# Patient Record
Sex: FEMALE | Race: WHITE | NOT HISPANIC OR LATINO | Employment: OTHER | URBAN - METROPOLITAN AREA
[De-identification: names, ages, dates, MRNs, and addresses within clinical notes are randomized per-mention and may not be internally consistent; named-entity substitution may affect disease eponyms.]

---

## 2023-04-13 ENCOUNTER — OFFICE VISIT (OUTPATIENT)
Dept: PRIMARY CARE | Facility: CLINIC | Age: 63
End: 2023-04-13
Payer: COMMERCIAL

## 2023-04-13 VITALS
OXYGEN SATURATION: 98 % | HEART RATE: 90 BPM | HEIGHT: 62 IN | TEMPERATURE: 97.7 F | BODY MASS INDEX: 29.08 KG/M2 | SYSTOLIC BLOOD PRESSURE: 122 MMHG | WEIGHT: 158 LBS | DIASTOLIC BLOOD PRESSURE: 76 MMHG | RESPIRATION RATE: 18 BRPM

## 2023-04-13 DIAGNOSIS — T84.629A: ICD-10-CM

## 2023-04-13 DIAGNOSIS — N18.4 STAGE 4 CHRONIC KIDNEY DISEASE (MULTI): Primary | ICD-10-CM

## 2023-04-13 DIAGNOSIS — E78.5 HYPERLIPIDEMIA, UNSPECIFIED HYPERLIPIDEMIA TYPE: ICD-10-CM

## 2023-04-13 DIAGNOSIS — M35.3 PMR (POLYMYALGIA RHEUMATICA) (MULTI): ICD-10-CM

## 2023-04-13 DIAGNOSIS — I50.20 HFREF (HEART FAILURE WITH REDUCED EJECTION FRACTION) (MULTI): ICD-10-CM

## 2023-04-13 PROBLEM — I10 BENIGN ESSENTIAL HYPERTENSION: Status: ACTIVE | Noted: 2019-03-18

## 2023-04-13 PROBLEM — S82.63XA CLOSED FRACTURE OF LATERAL MALLEOLUS: Status: ACTIVE | Noted: 2022-02-14

## 2023-04-13 PROBLEM — M31.6 GCA (GIANT CELL ARTERITIS) (MULTI): Status: ACTIVE | Noted: 2019-03-18

## 2023-04-13 PROBLEM — I51.81 TAKOTSUBO CARDIOMYOPATHY: Status: ACTIVE | Noted: 2021-03-17

## 2023-04-13 PROBLEM — E78.2 MIXED HYPERLIPIDEMIA: Chronic | Status: ACTIVE | Noted: 2022-07-24

## 2023-04-13 PROBLEM — I50.32 CHRONIC DIASTOLIC CHF (CONGESTIVE HEART FAILURE) (MULTI): Status: ACTIVE | Noted: 2022-08-16

## 2023-04-13 PROBLEM — Q60.2 ABSENT KIDNEY, CONGENITAL: Status: ACTIVE | Noted: 2019-03-18

## 2023-04-13 PROBLEM — S82.53XA CLOSED FRACTURE OF MEDIAL MALLEOLUS: Status: ACTIVE | Noted: 2022-02-14

## 2023-04-13 PROCEDURE — 1036F TOBACCO NON-USER: CPT | Performed by: FAMILY MEDICINE

## 2023-04-13 PROCEDURE — 3078F DIAST BP <80 MM HG: CPT | Performed by: FAMILY MEDICINE

## 2023-04-13 PROCEDURE — 99214 OFFICE O/P EST MOD 30 MIN: CPT | Performed by: FAMILY MEDICINE

## 2023-04-13 PROCEDURE — 3074F SYST BP LT 130 MM HG: CPT | Performed by: FAMILY MEDICINE

## 2023-04-13 RX ORDER — PREDNISONE 5 MG/1
TABLET ORAL
COMMUNITY
End: 2023-04-13

## 2023-04-13 RX ORDER — NIFEDIPINE 90 MG/1
1 TABLET, EXTENDED RELEASE ORAL 2 TIMES DAILY
COMMUNITY
Start: 2023-02-13 | End: 2023-04-19 | Stop reason: SDUPTHER

## 2023-04-13 RX ORDER — NIFEDIPINE 90 MG/1
1 TABLET, FILM COATED, EXTENDED RELEASE ORAL 2 TIMES DAILY
COMMUNITY
Start: 2023-03-18 | End: 2023-04-13

## 2023-04-13 RX ORDER — SODIUM POLYSTYRENE SULFONATE 4.1 MEQ/G
POWDER, FOR SUSPENSION ORAL; RECTAL
COMMUNITY
Start: 2022-10-06 | End: 2023-04-13

## 2023-04-13 RX ORDER — CIPROFLOXACIN 500 MG/1
TABLET ORAL
COMMUNITY
Start: 2022-07-26 | End: 2023-04-13

## 2023-04-13 RX ORDER — ACETAMINOPHEN 500 MG
1 TABLET ORAL DAILY
Status: ON HOLD | COMMUNITY
Start: 2018-03-05 | End: 2023-10-28 | Stop reason: ENTERED-IN-ERROR

## 2023-04-13 RX ORDER — LEFLUNOMIDE 20 MG/1
20 TABLET ORAL DAILY
Status: ON HOLD | COMMUNITY
End: 2023-10-28 | Stop reason: ALTCHOICE

## 2023-04-13 RX ORDER — OXYCODONE HYDROCHLORIDE 5 MG/1
1 TABLET ORAL EVERY 6 HOURS
COMMUNITY
Start: 2022-11-09 | End: 2023-04-13

## 2023-04-13 RX ORDER — LOSARTAN POTASSIUM AND HYDROCHLOROTHIAZIDE 12.5; 5 MG/1; MG/1
1 TABLET ORAL DAILY
COMMUNITY
Start: 2022-06-29 | End: 2023-04-13

## 2023-04-13 RX ORDER — PREDNISONE 1 MG/1
1 TABLET ORAL DAILY
Qty: 30 TABLET | Refills: 3 | Status: SHIPPED | COMMUNITY
Start: 2023-04-13 | End: 2023-06-08

## 2023-04-13 RX ORDER — LOSARTAN POTASSIUM 50 MG/1
1 TABLET ORAL DAILY
COMMUNITY
Start: 2022-08-22 | End: 2023-04-13

## 2023-04-13 RX ORDER — IBANDRONATE SODIUM 150 MG/1
TABLET, FILM COATED ORAL
COMMUNITY
Start: 2018-03-05 | End: 2023-04-13

## 2023-04-13 RX ORDER — PREDNISONE 20 MG/1
26 TABLET ORAL DAILY
Status: ON HOLD | COMMUNITY
End: 2023-10-28 | Stop reason: ENTERED-IN-ERROR

## 2023-04-13 RX ORDER — NIFEDIPINE 60 MG/1
TABLET, FILM COATED, EXTENDED RELEASE ORAL 2 TIMES DAILY
COMMUNITY
End: 2023-04-13

## 2023-04-13 RX ORDER — APIXABAN 5 MG/1
5 TABLET, FILM COATED ORAL 2 TIMES DAILY
COMMUNITY
End: 2023-06-08

## 2023-04-13 RX ORDER — NIFEDIPINE 30 MG/1
1 TABLET, EXTENDED RELEASE ORAL DAILY
COMMUNITY
Start: 2022-10-01 | End: 2023-04-13

## 2023-04-13 RX ORDER — POLYETHYLENE GLYCOL 3350, SODIUM CHLORIDE, SODIUM BICARBONATE, POTASSIUM CHLORIDE 420; 11.2; 5.72; 1.48 G/4L; G/4L; G/4L; G/4L
POWDER, FOR SOLUTION ORAL
COMMUNITY
Start: 2022-11-21 | End: 2023-06-08

## 2023-04-13 RX ORDER — LEVOFLOXACIN 500 MG/1
1 TABLET, FILM COATED ORAL DAILY
COMMUNITY
Start: 2022-04-27 | End: 2023-04-13

## 2023-04-13 RX ORDER — CHOLECALCIFEROL (VITAMIN D3) 50 MCG
1 TABLET ORAL DAILY
COMMUNITY
End: 2023-04-13

## 2023-04-13 RX ORDER — ESOMEPRAZOLE MAGNESIUM 40 MG/1
1 CAPSULE, DELAYED RELEASE ORAL
COMMUNITY
Start: 2018-01-26 | End: 2023-04-13

## 2023-04-13 RX ORDER — HYDROCHLOROTHIAZIDE 25 MG/1
1 TABLET ORAL DAILY
COMMUNITY
Start: 2018-01-16 | End: 2023-04-13

## 2023-04-13 RX ORDER — AMITRIPTYLINE HYDROCHLORIDE 25 MG/1
25 TABLET, FILM COATED ORAL NIGHTLY
COMMUNITY
End: 2023-06-08 | Stop reason: SDUPTHER

## 2023-04-13 RX ORDER — MINERAL OIL
1 ENEMA (ML) RECTAL DAILY
COMMUNITY
Start: 2018-03-05

## 2023-04-13 RX ORDER — OMEPRAZOLE 20 MG/1
1 TABLET, DELAYED RELEASE ORAL DAILY
COMMUNITY
End: 2023-04-13

## 2023-04-13 RX ORDER — EVOLOCUMAB 140 MG/ML
INJECTION, SOLUTION SUBCUTANEOUS
Status: ON HOLD | COMMUNITY
Start: 2023-04-07 | End: 2023-12-25 | Stop reason: ENTERED-IN-ERROR

## 2023-04-13 RX ORDER — METOPROLOL SUCCINATE 50 MG/1
TABLET, EXTENDED RELEASE ORAL
COMMUNITY
Start: 2018-01-16 | End: 2023-04-13

## 2023-04-13 RX ORDER — NIFEDIPINE 60 MG/1
1 TABLET, EXTENDED RELEASE ORAL 2 TIMES DAILY
COMMUNITY
Start: 2022-12-21 | End: 2023-04-13

## 2023-04-13 RX ORDER — ASPIRIN 81 MG/1
TABLET ORAL
Status: ON HOLD | COMMUNITY
Start: 2023-03-06 | End: 2023-10-28 | Stop reason: ENTERED-IN-ERROR

## 2023-04-13 RX ORDER — PREDNISONE 1 MG/1
TABLET ORAL
COMMUNITY
End: 2023-04-13 | Stop reason: DRUGHIGH

## 2023-04-13 RX ORDER — MECLIZINE HYDROCHLORIDE 25 MG/1
TABLET ORAL
COMMUNITY
Start: 2022-07-26 | End: 2023-04-13

## 2023-04-13 RX ORDER — AMOXICILLIN 500 MG
1200 CAPSULE ORAL
Status: ON HOLD | COMMUNITY
End: 2023-10-28 | Stop reason: ALTCHOICE

## 2023-04-13 RX ORDER — OMEPRAZOLE 20 MG/1
1 CAPSULE, DELAYED RELEASE ORAL DAILY
COMMUNITY
Start: 2023-01-30

## 2023-04-13 RX ORDER — METOPROLOL SUCCINATE 100 MG/1
100 TABLET, EXTENDED RELEASE ORAL 2 TIMES DAILY
COMMUNITY
End: 2023-06-08

## 2023-04-13 ASSESSMENT — ENCOUNTER SYMPTOMS
HEADACHES: 0
SHORTNESS OF BREATH: 0

## 2023-04-13 NOTE — PROGRESS NOTES
"Subjective     Estephania Hernandez is a 62 y.o. female who presents for Hospital Follow-up (Pt. In for hospital follow up after leg surgery.).    HPI     Pt is here for hospital follow up from [facility].  She has history of right tibia fracture (Feb. 2022). She was admitted to [facility] on 2/28/23 , discharged 3/6/23.   She had surgery on her right leg by Dr. Fernandez Melissa MD for infection associated with internal fixation device of bone of lower leg.  She was electively admitted on 2/28.  Surgery was performed on 3/1/23.  Her surgery went well, she had post operative xrays in recovery room that showed intact hardware.  While in hospital, nephrology was consulted due to elevated creatinine over baseline.  She has CKD III.  She has history of one kidney (born with one kidney).  She sees ID at [facility].  She will be following up next week with ID.  Pt has PICC line in place and is getting IV antibiotic (Ertapenem).  Pt sees Dr. Melissa routinely for her right leg pain and fx hx.      She has had multiple surgeries on her right lower leg.       She is overall feeling better since starting antibiotics.  She gets HHC and wound care dressing changes at home.  C is also managing her PICC.  She denies fevers, chills.      Pt sees  cardio, Dr. Espitia, for HLD - on repatha.  Statin intolerance    Pt sees rheumatology, Dr. Sanchez , on prednisone, hx of giant cell arteritis and polymyalgia rheumatica.          Review of Systems   Respiratory:  Negative for shortness of breath.    Cardiovascular:  Negative for chest pain.   Neurological:  Negative for headaches.       Objective     Vitals:    04/13/23 1004   BP: 122/76   BP Location: Right arm   Patient Position: Sitting   Pulse: 90   Resp: 18   Temp: 36.5 °C (97.7 °F)   TempSrc: Temporal   SpO2: 98%   Weight: 71.7 kg (158 lb)   Height: 1.575 m (5' 2\")        Current Outpatient Medications   Medication Instructions    amitriptyline (ELAVIL) 25 mg, oral, Nightly    aspirin " 81 mg EC tablet TAKE ONE TABLET BY MOUTH TWO TIMES A DAY. ONCE COMPLETED 1 MONTH OF TWICE DAILY DOSING MAY THEN RESUME YOUR NORMAL ONCE A DAY DOSE OF THIS M    cholecalciferol (Vitamin D-3) 50 mcg (2,000 unit) capsule 1 capsule, oral, Daily    Eliquis 5 mg, oral, 2 times daily    ertapenem sodium (ERTAPENEM IV) 500 mg, intravenous, Daily RT    fexofenadine (Allegra) 180 mg tablet 1 tablet, oral, Daily    leflunomide (ARAVA) 20 mg, oral, Daily    metoprolol succinate XL (TOPROL-XL) 100 mg, oral, 2 times daily    NIFEdipine XL (Procardia XL) 90 mg 24 hr tablet 1 tablet, oral, 2 times daily    omega-3 fatty acids-fish oil 300-1,000 mg capsule 1,200 mg, oral    omeprazole (PriLOSEC) 20 mg DR capsule 1 capsule, oral, Daily    polyethylene glycol-electrolytes 420 gram solution TAKE 4000 ML Other as per split dose [facility] instructions    predniSONE (Deltasone) 20 mg tablet oral    predniSONE (DELTASONE) 1 mg, oral, Daily    Repatha SureClick 140 mg/mL injection INJECT 140MG SUBCUTEANOUSLY EVERY 2 WEEKS        Past Surgical History:   Procedure Laterality Date    OTHER SURGICAL HISTORY  10/21/2022    Mastectomy    OTHER SURGICAL HISTORY  10/21/2022    Lower leg fracture repair    OTHER SURGICAL HISTORY  10/21/2022    Ankle fracture repair    OTHER SURGICAL HISTORY  10/21/2022    Lower leg fracture repair    OTHER SURGICAL HISTORY  10/21/2022    Colonoscopy    OTHER SURGICAL HISTORY  10/21/2022    Temporal artery biopsy    OTHER SURGICAL HISTORY  10/21/2022    Mastectomy bilateral    OTHER SURGICAL HISTORY  10/21/2022    Appendectomy        Social History     Tobacco Use    Smoking status: Never    Smokeless tobacco: Never        No family history on file.     Immunization History   Administered Date(s) Administered    Pfizer Purple Cap SARS-CoV-2 04/05/2021, 04/26/2021, 12/28/2021    Pfizer Sars-cov-2 Bivalent 30 mcg/0.3 mL 10/28/2022        Physical Exam  Vitals reviewed.   Constitutional:       General: She is not in  acute distress.     Appearance: Normal appearance. She is well-developed. She is obese.   HENT:      Head: Normocephalic and atraumatic.      Nose: Nose normal.   Eyes:      General: Lids are normal.      Extraocular Movements: Extraocular movements intact.      Conjunctiva/sclera: Conjunctivae normal.      Right eye: Right conjunctiva is not injected.      Left eye: Left conjunctiva is not injected.   Cardiovascular:      Rate and Rhythm: Normal rate and regular rhythm.      Heart sounds: No murmur heard.  Pulmonary:      Effort: Pulmonary effort is normal. No respiratory distress.      Breath sounds: Normal breath sounds. No wheezing, rhonchi or rales.   Musculoskeletal:      Comments: Right lower leg is bandaged.  She does have moderate nonpitting edema on dorsal aspect of right foot which is chronic since her recent surgery on her right leg.    Lymphadenopathy:      Cervical: No cervical adenopathy.   Skin:     General: Skin is warm and dry.      Findings: No rash.   Neurological:      Mental Status: She is alert and oriented to person, place, and time. Mental status is at baseline.      Gait: Gait normal.   Psychiatric:         Mood and Affect: Mood normal.         Behavior: Behavior normal.         Problem List Items Addressed This Visit       HFrEF (heart failure with reduced ejection fraction) (CMS/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      HTN - controlled    CKD - sees nephro    Former nicotine  use    Leukocytosis - sees ID, pt is on chronic prednisone    Temporal arteritis, PMR - sees Rheum. On prednisone    Hx of HLD - statin intolerant - on repatha through  cardio.      Follow up in 3 months or sooner if needed     Go the the nearest Emergency Department if your condition worsens significantly or becomes life-threatening.

## 2023-04-14 LAB
CHOLESTEROL (MG/DL) IN SER/PLAS: 286 MG/DL (ref 0–199)
CHOLESTEROL IN HDL (MG/DL) IN SER/PLAS: 98.5 MG/DL
CHOLESTEROL/HDL RATIO: 2.9
LDL: 132 MG/DL (ref 0–99)
NON HDL CHOLESTEROL: 188 MG/DL
TRIGLYCERIDE (MG/DL) IN SER/PLAS: 278 MG/DL (ref 0–149)
VLDL: 56 MG/DL (ref 0–40)

## 2023-04-17 LAB
CHOLESTEROL, TOTAL(NMR): 314 MG/DL (ref 100–199)
HDL-C(NMR): 97 MG/DL
HDL-P (TOTAL)(NMR): 55.6 UMOL/L
LDL AND HDL PARTICLES -DATA CONVERSION: ABNORMAL
LDL SIZE(NMR): 21.7 NM
LDL-C (NIH CALC)(NMR): 167 MG/DL (ref 0–99)
LDL-P(NMR): 1482 NMOL/L
LP-IR SCORE(NMR): 41
SMALL LDL-P(NMR): 345 NMOL/L
TRIGLYCERIDES(NMR): 274 MG/DL (ref 0–149)

## 2023-04-19 DIAGNOSIS — I10 HYPERTENSION, ESSENTIAL, BENIGN: Primary | ICD-10-CM

## 2023-04-19 RX ORDER — NIFEDIPINE 90 MG/1
90 TABLET, EXTENDED RELEASE ORAL 2 TIMES DAILY
Qty: 180 TABLET | Refills: 3 | Status: SHIPPED | OUTPATIENT
Start: 2023-04-19 | End: 2024-05-02 | Stop reason: SDUPTHER

## 2023-05-31 ENCOUNTER — PATIENT OUTREACH (OUTPATIENT)
Dept: CARE COORDINATION | Facility: CLINIC | Age: 63
End: 2023-05-31

## 2023-05-31 RX ORDER — METOPROLOL TARTRATE 50 MG/1
1 TABLET ORAL 2 TIMES DAILY
COMMUNITY

## 2023-05-31 RX ORDER — LISINOPRIL 5 MG/1
5 TABLET ORAL DAILY
Status: ON HOLD | COMMUNITY
End: 2023-10-28 | Stop reason: ALTCHOICE

## 2023-05-31 NOTE — PROGRESS NOTES
Discharge Facility: [facility]  Discharge Diagnosis: Non-healing wound of lower extremity, Cardiopulmonary arrest, Altered mental status, Acute respiratory failure, Sepsis, SUKHDEEP  Admission Date: 5/18/2023  Discharge Date: 5/30/2023    PCP Appointment Date: Pt requesting virtual follow up appt. Messsage sent to office clerical to attempt to reach out and schedule.     Specialist Appointment Date:   Infectious disease, hematologist/oncologist, orthopedic surgeon, cardiology. Appts not scheduled at this time. Pt encouraged to schedule.     Hospital Encounter and Summary: Linked   See discharge assessment below for further details    Engagement  Call Start Time: 0910 (new prescriptions reviewed) (5/31/2023  9:10 AM)    Medications  Medications reviewed with patient/caregiver?: Yes (new prescriptions reviewed) (5/31/2023  9:10 AM)  Is the patient having any side effects they believe may be caused by any medication additions or changes?: No (5/31/2023  9:10 AM)  Does the patient have all medications ordered at discharge?: Yes (5/31/2023  9:10 AM)  Care Management Interventions: No intervention needed (5/31/2023  9:10 AM)  Is the patient taking all medications as directed (includes completed medication regime)?: Yes (5/31/2023  9:10 AM)  Care Management Interventions: Provided patient education (5/31/2023  9:10 AM)    Appointments  Does the patient have a primary care provider?: Yes (5/31/2023  9:10 AM)  Care Management Interventions: Advised patient to make appointment (5/31/2023  9:10 AM)  Care Management Interventions: Advised to schedule with specialist (5/31/2023  9:10 AM)    Self Management  What is the home health agency?: [facility] (5/31/2023  9:10 AM)  Has home health visited the patient within 72 hours of discharge?: Call prior to 72 hours (Per patient C to come today) (5/31/2023  9:10 AM)  What Durable Medical Equipment (DME) was ordered?: Pt states wound vac was delivered. (5/31/2023  9:10 AM)  Has all Durable  Medical Equipment (DME) been delivered?: Yes (5/31/2023  9:10 AM)    Patient Teaching  Does the patient have access to their discharge instructions?: Yes (5/31/2023  9:10 AM)  Care Management Interventions: Reviewed instructions with patient (5/31/2023  9:10 AM)  What is the patient's perception of their health status since discharge?: Improving (5/31/2023  9:10 AM)  Is the patient/caregiver able to teach back the hierarchy of who to call/visit for symptoms/problems? PCP, Specialist, Home Health nurse, Urgent Care, ED, 911: Yes (5/31/2023  9:10 AM)    Wrap Up  Wrap Up Additional Comments: Pt reports confusion over 3 weeks. Pt states she is having a hard time getting around at this time. Pt was discharged with rizzo and IV antiobiotics. (5/31/2023  9:10 AM)  Call End Time: 0940 (5/31/2023  9:10 AM)

## 2023-06-01 NOTE — PROGRESS NOTES
Per Dr. Valles's office, he had her stop her Eliquis back in December, but it remained on her med list. She does not need to be on the Eliquis and does not need to follow up with him at this time. -NA

## 2023-06-05 LAB
ALANINE AMINOTRANSFERASE (SGPT) (U/L) IN SER/PLAS: 24 U/L (ref 7–45)
ALBUMIN (G/DL) IN SER/PLAS: 4 G/DL (ref 3.4–5)
ALKALINE PHOSPHATASE (U/L) IN SER/PLAS: 151 U/L (ref 33–136)
ANION GAP IN SER/PLAS: 17 MMOL/L (ref 10–20)
ASPARTATE AMINOTRANSFERASE (SGOT) (U/L) IN SER/PLAS: 23 U/L (ref 9–39)
BILIRUBIN DIRECT (MG/DL) IN SER/PLAS: 0.1 MG/DL (ref 0–0.3)
BILIRUBIN TOTAL (MG/DL) IN SER/PLAS: 0.4 MG/DL (ref 0–1.2)
C REACTIVE PROTEIN (MG/L) IN SER/PLAS: 0.58 MG/DL
CALCIUM (MG/DL) IN SER/PLAS: 9.3 MG/DL (ref 8.6–10.3)
CARBON DIOXIDE, TOTAL (MMOL/L) IN SER/PLAS: 22 MMOL/L (ref 21–32)
CHLORIDE (MMOL/L) IN SER/PLAS: 107 MMOL/L (ref 98–107)
CREATINE KINASE (U/L) IN SER/PLAS: 23 U/L (ref 0–215)
CREATININE (MG/DL) IN SER/PLAS: 1.87 MG/DL (ref 0.5–1.05)
ERYTHROCYTE DISTRIBUTION WIDTH (RATIO) BY AUTOMATED COUNT: 18.9 % (ref 11.5–14.5)
ERYTHROCYTE MEAN CORPUSCULAR HEMOGLOBIN CONCENTRATION (G/DL) BY AUTOMATED: 29.9 G/DL (ref 32–36)
ERYTHROCYTE MEAN CORPUSCULAR VOLUME (FL) BY AUTOMATED COUNT: 101 FL (ref 80–100)
ERYTHROCYTES (10*6/UL) IN BLOOD BY AUTOMATED COUNT: 3.36 X10E12/L (ref 4–5.2)
GFR FEMALE: 30 ML/MIN/1.73M2
GLUCOSE (MG/DL) IN SER/PLAS: 125 MG/DL (ref 74–99)
HEMATOCRIT (%) IN BLOOD BY AUTOMATED COUNT: 34.1 % (ref 36–46)
HEMOGLOBIN (G/DL) IN BLOOD: 10.2 G/DL (ref 12–16)
LEUKOCYTES (10*3/UL) IN BLOOD BY AUTOMATED COUNT: 13.7 X10E9/L (ref 4.4–11.3)
PLATELETS (10*3/UL) IN BLOOD AUTOMATED COUNT: 476 X10E9/L (ref 150–450)
POTASSIUM (MMOL/L) IN SER/PLAS: 4.8 MMOL/L (ref 3.5–5.3)
PROTEIN TOTAL: 6.3 G/DL (ref 6.4–8.2)
SODIUM (MMOL/L) IN SER/PLAS: 141 MMOL/L (ref 136–145)
UREA NITROGEN (MG/DL) IN SER/PLAS: 23 MG/DL (ref 6–23)

## 2023-06-08 ENCOUNTER — OFFICE VISIT (OUTPATIENT)
Dept: PRIMARY CARE | Facility: CLINIC | Age: 63
End: 2023-06-08
Payer: COMMERCIAL

## 2023-06-08 VITALS
DIASTOLIC BLOOD PRESSURE: 86 MMHG | BODY MASS INDEX: 25.97 KG/M2 | TEMPERATURE: 97.7 F | SYSTOLIC BLOOD PRESSURE: 124 MMHG | RESPIRATION RATE: 18 BRPM | HEART RATE: 89 BPM | OXYGEN SATURATION: 95 % | WEIGHT: 142 LBS

## 2023-06-08 DIAGNOSIS — A41.9 SEPSIS, DUE TO UNSPECIFIED ORGANISM, UNSPECIFIED WHETHER ACUTE ORGAN DYSFUNCTION PRESENT (MULTI): ICD-10-CM

## 2023-06-08 DIAGNOSIS — I50.20 HFREF (HEART FAILURE WITH REDUCED EJECTION FRACTION) (MULTI): ICD-10-CM

## 2023-06-08 DIAGNOSIS — I10 HYPERTENSION, ESSENTIAL, BENIGN: Primary | ICD-10-CM

## 2023-06-08 DIAGNOSIS — E78.5 HYPERLIPIDEMIA, UNSPECIFIED HYPERLIPIDEMIA TYPE: ICD-10-CM

## 2023-06-08 DIAGNOSIS — N18.4 STAGE 4 CHRONIC KIDNEY DISEASE (MULTI): ICD-10-CM

## 2023-06-08 DIAGNOSIS — T84.629A: ICD-10-CM

## 2023-06-08 DIAGNOSIS — S81.801S NON-HEALING WOUND OF LOWER EXTREMITY, RIGHT, SEQUELA: ICD-10-CM

## 2023-06-08 PROBLEM — I46.9 CARDIOPULMONARY ARREST (MULTI): Status: ACTIVE | Noted: 2023-06-08

## 2023-06-08 PROBLEM — G47.00 INSOMNIA: Status: ACTIVE | Noted: 2023-06-08

## 2023-06-08 PROBLEM — K21.9 GERD (GASTROESOPHAGEAL REFLUX DISEASE): Status: ACTIVE | Noted: 2023-06-08

## 2023-06-08 PROBLEM — Z86.711 HISTORY OF PULMONARY EMBOLISM: Status: ACTIVE | Noted: 2023-06-08

## 2023-06-08 PROBLEM — S81.809A NON-HEALING WOUND OF LOWER EXTREMITY: Status: ACTIVE | Noted: 2023-06-08

## 2023-06-08 PROBLEM — E55.9 VITAMIN D DEFICIENCY: Status: ACTIVE | Noted: 2023-06-08

## 2023-06-08 PROCEDURE — 1036F TOBACCO NON-USER: CPT | Performed by: FAMILY MEDICINE

## 2023-06-08 PROCEDURE — 99214 OFFICE O/P EST MOD 30 MIN: CPT | Performed by: FAMILY MEDICINE

## 2023-06-08 PROCEDURE — 3079F DIAST BP 80-89 MM HG: CPT | Performed by: FAMILY MEDICINE

## 2023-06-08 PROCEDURE — 3074F SYST BP LT 130 MM HG: CPT | Performed by: FAMILY MEDICINE

## 2023-06-08 RX ORDER — AMITRIPTYLINE HYDROCHLORIDE 25 MG/1
25 TABLET, FILM COATED ORAL NIGHTLY
Qty: 90 TABLET | Refills: 3 | COMMUNITY
Start: 2023-06-08 | End: 2023-08-29 | Stop reason: SDUPTHER

## 2023-06-08 ASSESSMENT — ENCOUNTER SYMPTOMS
SHORTNESS OF BREATH: 0
HEADACHES: 0

## 2023-06-08 NOTE — PROGRESS NOTES
Subjective     Estephania Hernandez is a 62 y.o. female who presents for Hospital Follow-up.    HPI     Pt is following up from hospital altered mental status , lethargy, cardiac arrest, bradycardia, sepsis secondary to right lower leg wound.  Pt suffered a cardiac arrest while in ER at [facility] -resuscitated - intubated, admitted to ICU, treated with IV antbiotics, antivirals, had lumbar puncture, EEG.  Found to have SUKHDEEP, hx of solitary kidney, went on CRRT while in ICU.  Pt had nephrology, neurology, cardiology, orthopedist and ID consultations.  PICC line in place, pt is on two IV antibiotics (daptomycin and ceftazidime)  and sees wound care, ID, ortho,  and home health care.  Her neice helps with bandage changes of her right lower extremity wound.      She sees rheum for temporal arteritis - on prednisone.      She had recent cardio visit and will be getting stress test next month.     She takes amitriptyline 25 mg half tab at bedtime however her sleep has been more disrupted over the last few weeks due to increase in her prednisone.  She is requesting increase of her amitriptyline.      Overall she has been feeling better since starting the IV antibiotics for her right lower leg wound, hx of tib/fib fx.  She is closely followed by  wound care and ID.  She gets weekly labs.      Review of Systems   Respiratory:  Negative for shortness of breath.    Cardiovascular:  Negative for chest pain.   Neurological:  Negative for headaches.       Objective     Vitals:    06/08/23 1344   BP: 124/86   BP Location: Left arm   Patient Position: Sitting   Pulse: 89   Resp: 18   Temp: 36.5 °C (97.7 °F)   TempSrc: Temporal   SpO2: 95%   Weight: 64.4 kg (142 lb)        Current Outpatient Medications   Medication Instructions    amitriptyline (ELAVIL) 25 mg, oral, Nightly    aspirin 81 mg EC tablet TAKE ONE TABLET BY MOUTH TWO TIMES A DAY. ONCE COMPLETED 1 MONTH OF TWICE DAILY DOSING MAY THEN RESUME YOUR NORMAL ONCE A DAY DOSE OF THIS M     cholecalciferol (Vitamin D-3) 50 mcg (2,000 unit) capsule 1 capsule, oral, Daily    fexofenadine (Allegra) 180 mg tablet 1 tablet, oral, Daily    leflunomide (ARAVA) 20 mg, oral, Daily    lisinopril 5 mg, oral, Daily    metoprolol tartrate (LOPRESSOR) 50 mg, oral, 2 times daily    NIFEdipine XL (PROCARDIA XL) 90 mg, oral, 2 times daily    omega-3 fatty acids-fish oil 300-1,000 mg capsule 1,200 mg, oral    omeprazole (PriLOSEC) 20 mg DR capsule 1 capsule, oral, Daily    predniSONE (DELTASONE) 40 mg, oral, Daily    Repatha SureClick 140 mg/mL injection INJECT 140MG SUBCUTEANOUSLY EVERY 2 WEEKS        Past Surgical History:   Procedure Laterality Date    OTHER SURGICAL HISTORY  10/21/2022    Mastectomy    OTHER SURGICAL HISTORY  10/21/2022    Lower leg fracture repair    OTHER SURGICAL HISTORY  10/21/2022    Ankle fracture repair    OTHER SURGICAL HISTORY  10/21/2022    Lower leg fracture repair    OTHER SURGICAL HISTORY  10/21/2022    Colonoscopy    OTHER SURGICAL HISTORY  10/21/2022    Temporal artery biopsy    OTHER SURGICAL HISTORY  10/21/2022    Mastectomy bilateral    OTHER SURGICAL HISTORY  10/21/2022    Appendectomy        Social History     Tobacco Use    Smoking status: Never    Smokeless tobacco: Never        No family history on file.     Immunization History   Administered Date(s) Administered    Pfizer Purple Cap SARS-CoV-2 04/05/2021, 04/26/2021, 12/28/2021    Pfizer Sars-cov-2 Bivalent 30 mcg/0.3 mL 10/28/2022        Physical Exam  Vitals reviewed.   Constitutional:       General: She is not in acute distress.     Appearance: Normal appearance. She is well-developed.   HENT:      Head: Normocephalic and atraumatic.      Nose: Nose normal.   Eyes:      General: Lids are normal.      Extraocular Movements: Extraocular movements intact.      Conjunctiva/sclera: Conjunctivae normal.      Right eye: Right conjunctiva is not injected.      Left eye: Left conjunctiva is not injected.   Cardiovascular:       Rate and Rhythm: Normal rate and regular rhythm.      Heart sounds: No murmur heard.  Pulmonary:      Effort: Pulmonary effort is normal. No respiratory distress.      Breath sounds: Normal breath sounds. No wheezing, rhonchi or rales.   Lymphadenopathy:      Cervical: No cervical adenopathy.   Skin:     General: Skin is warm and dry.      Findings: No rash.      Comments: Right lower leg bandaged    Neurological:      Mental Status: She is alert and oriented to person, place, and time. Mental status is at baseline.   Psychiatric:         Mood and Affect: Mood normal.         Behavior: Behavior normal.         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

## 2023-06-12 LAB
ALANINE AMINOTRANSFERASE (SGPT) (U/L) IN SER/PLAS: 21 U/L (ref 7–45)
ALBUMIN (G/DL) IN SER/PLAS: 3.8 G/DL (ref 3.4–5)
ALKALINE PHOSPHATASE (U/L) IN SER/PLAS: 110 U/L (ref 33–136)
ASPARTATE AMINOTRANSFERASE (SGOT) (U/L) IN SER/PLAS: 17 U/L (ref 9–39)
BILIRUBIN DIRECT (MG/DL) IN SER/PLAS: 0 MG/DL (ref 0–0.3)
BILIRUBIN TOTAL (MG/DL) IN SER/PLAS: 0.3 MG/DL (ref 0–1.2)
C REACTIVE PROTEIN (MG/L) IN SER/PLAS: 0.93 MG/DL
CREATINE KINASE (U/L) IN SER/PLAS: 22 U/L (ref 0–215)
ERYTHROCYTE DISTRIBUTION WIDTH (RATIO) BY AUTOMATED COUNT: 18.3 % (ref 11.5–14.5)
ERYTHROCYTE MEAN CORPUSCULAR HEMOGLOBIN CONCENTRATION (G/DL) BY AUTOMATED: 29.9 G/DL (ref 32–36)
ERYTHROCYTE MEAN CORPUSCULAR VOLUME (FL) BY AUTOMATED COUNT: 101 FL (ref 80–100)
ERYTHROCYTES (10*6/UL) IN BLOOD BY AUTOMATED COUNT: 3.34 X10E12/L (ref 4–5.2)
HEMATOCRIT (%) IN BLOOD BY AUTOMATED COUNT: 33.8 % (ref 36–46)
HEMOGLOBIN (G/DL) IN BLOOD: 10.1 G/DL (ref 12–16)
LEUKOCYTES (10*3/UL) IN BLOOD BY AUTOMATED COUNT: 12.9 X10E9/L (ref 4.4–11.3)
NRBC (PER 100 WBCS) BY AUTOMATED COUNT: 0 /100 WBC (ref 0–0)
PLATELETS (10*3/UL) IN BLOOD AUTOMATED COUNT: 306 X10E9/L (ref 150–450)
PROTEIN TOTAL: 6.2 G/DL (ref 6.4–8.2)

## 2023-06-15 ENCOUNTER — PATIENT OUTREACH (OUTPATIENT)
Dept: CARE COORDINATION | Facility: CLINIC | Age: 63
End: 2023-06-15

## 2023-06-15 NOTE — PROGRESS NOTES
Unable to reach patient for call back after patient's follow up appointment with PCP.     If no voicemail available call attempts x 2 were made to contact the patient to assist with any questions or concerns patient may have.

## 2023-06-21 LAB
ANION GAP IN SER/PLAS: 20 MMOL/L (ref 10–20)
CALCIUM (MG/DL) IN SER/PLAS: 9.8 MG/DL (ref 8.6–10.3)
CARBON DIOXIDE, TOTAL (MMOL/L) IN SER/PLAS: 21 MMOL/L (ref 21–32)
CHLORIDE (MMOL/L) IN SER/PLAS: 104 MMOL/L (ref 98–107)
CREATININE (MG/DL) IN SER/PLAS: 2.6 MG/DL (ref 0.5–1.05)
ERYTHROCYTE DISTRIBUTION WIDTH (RATIO) BY AUTOMATED COUNT: 17.3 % (ref 11.5–14.5)
ERYTHROCYTE MEAN CORPUSCULAR HEMOGLOBIN CONCENTRATION (G/DL) BY AUTOMATED: 29.8 G/DL (ref 32–36)
ERYTHROCYTE MEAN CORPUSCULAR VOLUME (FL) BY AUTOMATED COUNT: 103 FL (ref 80–100)
ERYTHROCYTES (10*6/UL) IN BLOOD BY AUTOMATED COUNT: 3.62 X10E12/L (ref 4–5.2)
GFR FEMALE: 20 ML/MIN/1.73M2
GLUCOSE (MG/DL) IN SER/PLAS: 160 MG/DL (ref 74–99)
HEMATOCRIT (%) IN BLOOD BY AUTOMATED COUNT: 37.3 % (ref 36–46)
HEMOGLOBIN (G/DL) IN BLOOD: 11.1 G/DL (ref 12–16)
LEUKOCYTES (10*3/UL) IN BLOOD BY AUTOMATED COUNT: 13.6 X10E9/L (ref 4.4–11.3)
PLATELETS (10*3/UL) IN BLOOD AUTOMATED COUNT: 361 X10E9/L (ref 150–450)
POTASSIUM (MMOL/L) IN SER/PLAS: 4.8 MMOL/L (ref 3.5–5.3)
SODIUM (MMOL/L) IN SER/PLAS: 140 MMOL/L (ref 136–145)
UREA NITROGEN (MG/DL) IN SER/PLAS: 42 MG/DL (ref 6–23)

## 2023-06-28 LAB
ALANINE AMINOTRANSFERASE (SGPT) (U/L) IN SER/PLAS: 10 U/L (ref 7–45)
ALBUMIN (G/DL) IN SER/PLAS: 3.8 G/DL (ref 3.4–5)
ALKALINE PHOSPHATASE (U/L) IN SER/PLAS: 61 U/L (ref 33–136)
ASPARTATE AMINOTRANSFERASE (SGOT) (U/L) IN SER/PLAS: 11 U/L (ref 9–39)
BILIRUBIN DIRECT (MG/DL) IN SER/PLAS: 0 MG/DL (ref 0–0.3)
BILIRUBIN TOTAL (MG/DL) IN SER/PLAS: 0.3 MG/DL (ref 0–1.2)
C REACTIVE PROTEIN (MG/L) IN SER/PLAS: 0.61 MG/DL
CREATININE (MG/DL) IN SER/PLAS: 2.05 MG/DL (ref 0.5–1.05)
GFR FEMALE: 27 ML/MIN/1.73M2
PROTEIN TOTAL: 5.9 G/DL (ref 6.4–8.2)
UREA NITROGEN (MG/DL) IN SER/PLAS: 47 MG/DL (ref 6–23)

## 2023-07-05 LAB
ALANINE AMINOTRANSFERASE (SGPT) (U/L) IN SER/PLAS: 13 U/L (ref 7–45)
ALBUMIN (G/DL) IN SER/PLAS: 3.9 G/DL (ref 3.4–5)
ALKALINE PHOSPHATASE (U/L) IN SER/PLAS: 67 U/L (ref 33–136)
ASPARTATE AMINOTRANSFERASE (SGOT) (U/L) IN SER/PLAS: 12 U/L (ref 9–39)
BILIRUBIN DIRECT (MG/DL) IN SER/PLAS: 0 MG/DL (ref 0–0.3)
BILIRUBIN TOTAL (MG/DL) IN SER/PLAS: 0.3 MG/DL (ref 0–1.2)
C REACTIVE PROTEIN (MG/L) IN SER/PLAS: 3.86 MG/DL
CREATININE (MG/DL) IN SER/PLAS: 2.17 MG/DL (ref 0.5–1.05)
GFR FEMALE: 25 ML/MIN/1.73M2
PROTEIN TOTAL: 6 G/DL (ref 6.4–8.2)
UREA NITROGEN (MG/DL) IN SER/PLAS: 51 MG/DL (ref 6–23)

## 2023-07-12 LAB
ALANINE AMINOTRANSFERASE (SGPT) (U/L) IN SER/PLAS: 12 U/L (ref 7–45)
ALBUMIN (G/DL) IN SER/PLAS: 3.8 G/DL (ref 3.4–5)
ALKALINE PHOSPHATASE (U/L) IN SER/PLAS: 62 U/L (ref 33–136)
ANION GAP IN SER/PLAS: 17 MMOL/L (ref 10–20)
ASPARTATE AMINOTRANSFERASE (SGOT) (U/L) IN SER/PLAS: 11 U/L (ref 9–39)
BILIRUBIN DIRECT (MG/DL) IN SER/PLAS: 0 MG/DL (ref 0–0.3)
BILIRUBIN TOTAL (MG/DL) IN SER/PLAS: 0.3 MG/DL (ref 0–1.2)
C REACTIVE PROTEIN (MG/L) IN SER/PLAS: 2.21 MG/DL
CALCIUM (MG/DL) IN SER/PLAS: 9.4 MG/DL (ref 8.6–10.3)
CARBON DIOXIDE, TOTAL (MMOL/L) IN SER/PLAS: 24 MMOL/L (ref 21–32)
CHLORIDE (MMOL/L) IN SER/PLAS: 107 MMOL/L (ref 98–107)
CREATINE KINASE (U/L) IN SER/PLAS: 12 U/L (ref 0–215)
CREATININE (MG/DL) IN SER/PLAS: 2.06 MG/DL (ref 0.5–1.05)
ERYTHROCYTE DISTRIBUTION WIDTH (RATIO) BY AUTOMATED COUNT: 15.9 % (ref 11.5–14.5)
ERYTHROCYTE MEAN CORPUSCULAR HEMOGLOBIN CONCENTRATION (G/DL) BY AUTOMATED: 29.7 G/DL (ref 32–36)
ERYTHROCYTE MEAN CORPUSCULAR VOLUME (FL) BY AUTOMATED COUNT: 101 FL (ref 80–100)
ERYTHROCYTES (10*6/UL) IN BLOOD BY AUTOMATED COUNT: 3.92 X10E12/L (ref 4–5.2)
GFR FEMALE: 27 ML/MIN/1.73M2
GLUCOSE (MG/DL) IN SER/PLAS: 88 MG/DL (ref 74–99)
HEMATOCRIT (%) IN BLOOD BY AUTOMATED COUNT: 39.7 % (ref 36–46)
HEMOGLOBIN (G/DL) IN BLOOD: 11.8 G/DL (ref 12–16)
LEUKOCYTES (10*3/UL) IN BLOOD BY AUTOMATED COUNT: 12.3 X10E9/L (ref 4.4–11.3)
PLATELETS (10*3/UL) IN BLOOD AUTOMATED COUNT: 328 X10E9/L (ref 150–450)
POTASSIUM (MMOL/L) IN SER/PLAS: 4.9 MMOL/L (ref 3.5–5.3)
PROTEIN TOTAL: 6 G/DL (ref 6.4–8.2)
SODIUM (MMOL/L) IN SER/PLAS: 143 MMOL/L (ref 136–145)
UREA NITROGEN (MG/DL) IN SER/PLAS: 50 MG/DL (ref 6–23)

## 2023-07-19 LAB
ALANINE AMINOTRANSFERASE (SGPT) (U/L) IN SER/PLAS: 11 U/L (ref 7–45)
ALBUMIN (G/DL) IN SER/PLAS: 4 G/DL (ref 3.4–5)
ALKALINE PHOSPHATASE (U/L) IN SER/PLAS: 56 U/L (ref 33–136)
ANION GAP IN SER/PLAS: 20 MMOL/L (ref 10–20)
ASPARTATE AMINOTRANSFERASE (SGOT) (U/L) IN SER/PLAS: 10 U/L (ref 9–39)
BILIRUBIN DIRECT (MG/DL) IN SER/PLAS: 0 MG/DL (ref 0–0.3)
BILIRUBIN TOTAL (MG/DL) IN SER/PLAS: 0.2 MG/DL (ref 0–1.2)
C REACTIVE PROTEIN (MG/L) IN SER/PLAS: 2.18 MG/DL
CALCIUM (MG/DL) IN SER/PLAS: 10 MG/DL (ref 8.6–10.3)
CARBON DIOXIDE, TOTAL (MMOL/L) IN SER/PLAS: 24 MMOL/L (ref 21–32)
CHLORIDE (MMOL/L) IN SER/PLAS: 104 MMOL/L (ref 98–107)
CREATINE KINASE (U/L) IN SER/PLAS: 12 U/L (ref 0–215)
CREATININE (MG/DL) IN SER/PLAS: 2.45 MG/DL (ref 0.5–1.05)
ERYTHROCYTE DISTRIBUTION WIDTH (RATIO) BY AUTOMATED COUNT: 15.5 % (ref 11.5–14.5)
ERYTHROCYTE MEAN CORPUSCULAR HEMOGLOBIN CONCENTRATION (G/DL) BY AUTOMATED: 30.9 G/DL (ref 32–36)
ERYTHROCYTE MEAN CORPUSCULAR VOLUME (FL) BY AUTOMATED COUNT: 100 FL (ref 80–100)
ERYTHROCYTES (10*6/UL) IN BLOOD BY AUTOMATED COUNT: 3.76 X10E12/L (ref 4–5.2)
GFR FEMALE: 22 ML/MIN/1.73M2
GLUCOSE (MG/DL) IN SER/PLAS: 79 MG/DL (ref 74–99)
HEMATOCRIT (%) IN BLOOD BY AUTOMATED COUNT: 37.6 % (ref 36–46)
HEMOGLOBIN (G/DL) IN BLOOD: 11.6 G/DL (ref 12–16)
LEUKOCYTES (10*3/UL) IN BLOOD BY AUTOMATED COUNT: 16.3 X10E9/L (ref 4.4–11.3)
PLATELETS (10*3/UL) IN BLOOD AUTOMATED COUNT: 332 X10E9/L (ref 150–450)
POTASSIUM (MMOL/L) IN SER/PLAS: 4.8 MMOL/L (ref 3.5–5.3)
PROTEIN TOTAL: 6.1 G/DL (ref 6.4–8.2)
SODIUM (MMOL/L) IN SER/PLAS: 143 MMOL/L (ref 136–145)
UREA NITROGEN (MG/DL) IN SER/PLAS: 67 MG/DL (ref 6–23)

## 2023-07-25 ENCOUNTER — PATIENT OUTREACH (OUTPATIENT)
Dept: CARE COORDINATION | Facility: CLINIC | Age: 63
End: 2023-07-25

## 2023-07-25 NOTE — PROGRESS NOTES
Call placed regarding 60 day post discharge follow up call.  At time of outreach call the patient feels as if their condition has improved since initial visit with PCP or specialist.  Questions or concerns regarding recovery period addressed at this time.   Reviewed any PCP or specialists progress notes/labs/radiology reports if applicable and addressed any questions or concerns.  Pt states she has a wound vac and has been following up with wound specialist and orthopedics.  Pt denies any issues, needs, or concerns at this time. Patient encouraged to call if questions arise.

## 2023-07-26 LAB
ALANINE AMINOTRANSFERASE (SGPT) (U/L) IN SER/PLAS: 10 U/L (ref 7–45)
ALBUMIN (G/DL) IN SER/PLAS: 3.8 G/DL (ref 3.4–5)
ALKALINE PHOSPHATASE (U/L) IN SER/PLAS: 52 U/L (ref 33–136)
ANION GAP IN SER/PLAS: 16 MMOL/L (ref 10–20)
ASPARTATE AMINOTRANSFERASE (SGOT) (U/L) IN SER/PLAS: 10 U/L (ref 9–39)
BASOPHILS (10*3/UL) IN BLOOD BY AUTOMATED COUNT: 0.09 X10E9/L (ref 0–0.1)
BASOPHILS/100 LEUKOCYTES IN BLOOD BY AUTOMATED COUNT: 0.8 % (ref 0–2)
BILIRUBIN DIRECT (MG/DL) IN SER/PLAS: 0 MG/DL (ref 0–0.3)
BILIRUBIN TOTAL (MG/DL) IN SER/PLAS: 0.2 MG/DL (ref 0–1.2)
C REACTIVE PROTEIN (MG/L) IN SER/PLAS: 0.74 MG/DL
CALCIUM (MG/DL) IN SER/PLAS: 9.7 MG/DL (ref 8.6–10.3)
CARBON DIOXIDE, TOTAL (MMOL/L) IN SER/PLAS: 24 MMOL/L (ref 21–32)
CHLORIDE (MMOL/L) IN SER/PLAS: 107 MMOL/L (ref 98–107)
CREATINE KINASE (U/L) IN SER/PLAS: 13 U/L (ref 0–215)
CREATININE (MG/DL) IN SER/PLAS: 2.39 MG/DL (ref 0.5–1.05)
ERYTHROCYTE DISTRIBUTION WIDTH (RATIO) BY AUTOMATED COUNT: 15.1 % (ref 11.5–14.5)
ERYTHROCYTE MEAN CORPUSCULAR HEMOGLOBIN CONCENTRATION (G/DL) BY AUTOMATED: 31 G/DL (ref 32–36)
ERYTHROCYTE MEAN CORPUSCULAR VOLUME (FL) BY AUTOMATED COUNT: 99 FL (ref 80–100)
ERYTHROCYTES (10*6/UL) IN BLOOD BY AUTOMATED COUNT: 3.85 X10E12/L (ref 4–5.2)
GFR FEMALE: 22 ML/MIN/1.73M2
GLUCOSE (MG/DL) IN SER/PLAS: 127 MG/DL (ref 74–99)
HEMATOCRIT (%) IN BLOOD BY AUTOMATED COUNT: 38.1 % (ref 36–46)
HEMOGLOBIN (G/DL) IN BLOOD: 11.8 G/DL (ref 12–16)
IMMATURE GRANULOCYTES/100 LEUKOCYTES IN BLOOD BY AUTOMATED COUNT: 3.1 % (ref 0–0.9)
LEUKOCYTES (10*3/UL) IN BLOOD BY AUTOMATED COUNT: 11.4 X10E9/L (ref 4.4–11.3)
LYMPHOCYTES (10*3/UL) IN BLOOD BY AUTOMATED COUNT: 1.64 X10E9/L (ref 1.2–4.8)
LYMPHOCYTES/100 LEUKOCYTES IN BLOOD BY AUTOMATED COUNT: 14.4 % (ref 13–44)
MONOCYTES (10*3/UL) IN BLOOD BY AUTOMATED COUNT: 0.78 X10E9/L (ref 0.1–1)
MONOCYTES/100 LEUKOCYTES IN BLOOD BY AUTOMATED COUNT: 6.8 % (ref 2–10)
NEUTROPHILS (10*3/UL) IN BLOOD BY AUTOMATED COUNT: 8.54 X10E9/L (ref 1.2–7.7)
NEUTROPHILS/100 LEUKOCYTES IN BLOOD BY AUTOMATED COUNT: 74.9 % (ref 40–80)
PLATELETS (10*3/UL) IN BLOOD AUTOMATED COUNT: 318 X10E9/L (ref 150–450)
POTASSIUM (MMOL/L) IN SER/PLAS: 4.1 MMOL/L (ref 3.5–5.3)
PROTEIN TOTAL: 5.9 G/DL (ref 6.4–8.2)
SODIUM (MMOL/L) IN SER/PLAS: 143 MMOL/L (ref 136–145)
UREA NITROGEN (MG/DL) IN SER/PLAS: 56 MG/DL (ref 6–23)

## 2023-08-02 LAB
ALANINE AMINOTRANSFERASE (SGPT) (U/L) IN SER/PLAS: 11 U/L (ref 7–45)
ALBUMIN (G/DL) IN SER/PLAS: 3.9 G/DL (ref 3.4–5)
ALKALINE PHOSPHATASE (U/L) IN SER/PLAS: 51 U/L (ref 33–136)
ANION GAP IN SER/PLAS: 16 MMOL/L (ref 10–20)
ASPARTATE AMINOTRANSFERASE (SGOT) (U/L) IN SER/PLAS: 10 U/L (ref 9–39)
BILIRUBIN DIRECT (MG/DL) IN SER/PLAS: 0 MG/DL (ref 0–0.3)
BILIRUBIN TOTAL (MG/DL) IN SER/PLAS: 0.2 MG/DL (ref 0–1.2)
C REACTIVE PROTEIN (MG/L) IN SER/PLAS: 0.62 MG/DL
CALCIUM (MG/DL) IN SER/PLAS: 9.7 MG/DL (ref 8.6–10.3)
CARBON DIOXIDE, TOTAL (MMOL/L) IN SER/PLAS: 24 MMOL/L (ref 21–32)
CHLORIDE (MMOL/L) IN SER/PLAS: 107 MMOL/L (ref 98–107)
CREATINE KINASE (U/L) IN SER/PLAS: 13 U/L (ref 0–215)
CREATININE (MG/DL) IN SER/PLAS: 2.36 MG/DL (ref 0.5–1.05)
ERYTHROCYTE DISTRIBUTION WIDTH (RATIO) BY AUTOMATED COUNT: 14.9 % (ref 11.5–14.5)
ERYTHROCYTE MEAN CORPUSCULAR HEMOGLOBIN CONCENTRATION (G/DL) BY AUTOMATED: 30.3 G/DL (ref 32–36)
ERYTHROCYTE MEAN CORPUSCULAR VOLUME (FL) BY AUTOMATED COUNT: 100 FL (ref 80–100)
ERYTHROCYTES (10*6/UL) IN BLOOD BY AUTOMATED COUNT: 3.87 X10E12/L (ref 4–5.2)
GFR FEMALE: 23 ML/MIN/1.73M2
GLUCOSE (MG/DL) IN SER/PLAS: 151 MG/DL (ref 74–99)
HEMATOCRIT (%) IN BLOOD BY AUTOMATED COUNT: 38.6 % (ref 36–46)
HEMOGLOBIN (G/DL) IN BLOOD: 11.7 G/DL (ref 12–16)
LEUKOCYTES (10*3/UL) IN BLOOD BY AUTOMATED COUNT: 10.5 X10E9/L (ref 4.4–11.3)
PLATELETS (10*3/UL) IN BLOOD AUTOMATED COUNT: 310 X10E9/L (ref 150–450)
POTASSIUM (MMOL/L) IN SER/PLAS: 4.3 MMOL/L (ref 3.5–5.3)
PROTEIN TOTAL: 5.9 G/DL (ref 6.4–8.2)
SODIUM (MMOL/L) IN SER/PLAS: 143 MMOL/L (ref 136–145)
UREA NITROGEN (MG/DL) IN SER/PLAS: 57 MG/DL (ref 6–23)

## 2023-08-09 LAB
CREATININE (MG/DL) IN SER/PLAS: 2.36 MG/DL (ref 0.5–1.05)
ERYTHROCYTE DISTRIBUTION WIDTH (RATIO) BY AUTOMATED COUNT: 14.7 % (ref 11.5–14.5)
ERYTHROCYTE MEAN CORPUSCULAR HEMOGLOBIN CONCENTRATION (G/DL) BY AUTOMATED: 30.4 G/DL (ref 32–36)
ERYTHROCYTE MEAN CORPUSCULAR VOLUME (FL) BY AUTOMATED COUNT: 100 FL (ref 80–100)
ERYTHROCYTES (10*6/UL) IN BLOOD BY AUTOMATED COUNT: 4.12 X10E12/L (ref 4–5.2)
GFR FEMALE: 23 ML/MIN/1.73M2
HEMATOCRIT (%) IN BLOOD BY AUTOMATED COUNT: 41.1 % (ref 36–46)
HEMOGLOBIN (G/DL) IN BLOOD: 12.5 G/DL (ref 12–16)
LEUKOCYTES (10*3/UL) IN BLOOD BY AUTOMATED COUNT: 9.4 X10E9/L (ref 4.4–11.3)
PLATELETS (10*3/UL) IN BLOOD AUTOMATED COUNT: 317 X10E9/L (ref 150–450)
UREA NITROGEN (MG/DL) IN SER/PLAS: 59 MG/DL (ref 6–23)

## 2023-08-16 LAB
ALANINE AMINOTRANSFERASE (SGPT) (U/L) IN SER/PLAS: 10 U/L (ref 7–45)
ALBUMIN (G/DL) IN SER/PLAS: 3.8 G/DL (ref 3.4–5)
ALKALINE PHOSPHATASE (U/L) IN SER/PLAS: 45 U/L (ref 33–136)
ANION GAP IN SER/PLAS: 19 MMOL/L (ref 10–20)
ASPARTATE AMINOTRANSFERASE (SGOT) (U/L) IN SER/PLAS: 10 U/L (ref 9–39)
BILIRUBIN DIRECT (MG/DL) IN SER/PLAS: 0 MG/DL (ref 0–0.3)
BILIRUBIN TOTAL (MG/DL) IN SER/PLAS: 0.3 MG/DL (ref 0–1.2)
C REACTIVE PROTEIN (MG/L) IN SER/PLAS: 0.44 MG/DL
CALCIUM (MG/DL) IN SER/PLAS: 9.4 MG/DL (ref 8.6–10.3)
CARBON DIOXIDE, TOTAL (MMOL/L) IN SER/PLAS: 24 MMOL/L (ref 21–32)
CHLORIDE (MMOL/L) IN SER/PLAS: 104 MMOL/L (ref 98–107)
CREATINE KINASE (U/L) IN SER/PLAS: 13 U/L (ref 0–215)
CREATININE (MG/DL) IN SER/PLAS: 2.66 MG/DL (ref 0.5–1.05)
ERYTHROCYTE DISTRIBUTION WIDTH (RATIO) BY AUTOMATED COUNT: 14.4 % (ref 11.5–14.5)
ERYTHROCYTE MEAN CORPUSCULAR HEMOGLOBIN CONCENTRATION (G/DL) BY AUTOMATED: 30.9 G/DL (ref 32–36)
ERYTHROCYTE MEAN CORPUSCULAR VOLUME (FL) BY AUTOMATED COUNT: 99 FL (ref 80–100)
ERYTHROCYTES (10*6/UL) IN BLOOD BY AUTOMATED COUNT: 3.94 X10E12/L (ref 4–5.2)
GFR FEMALE: 20 ML/MIN/1.73M2
GLUCOSE (MG/DL) IN SER/PLAS: 100 MG/DL (ref 74–99)
HEMATOCRIT (%) IN BLOOD BY AUTOMATED COUNT: 39.2 % (ref 36–46)
HEMOGLOBIN (G/DL) IN BLOOD: 12.1 G/DL (ref 12–16)
LEUKOCYTES (10*3/UL) IN BLOOD BY AUTOMATED COUNT: 8.4 X10E9/L (ref 4.4–11.3)
PLATELETS (10*3/UL) IN BLOOD AUTOMATED COUNT: 319 X10E9/L (ref 150–450)
POTASSIUM (MMOL/L) IN SER/PLAS: 4.7 MMOL/L (ref 3.5–5.3)
PROTEIN TOTAL: 6 G/DL (ref 6.4–8.2)
SODIUM (MMOL/L) IN SER/PLAS: 142 MMOL/L (ref 136–145)
UREA NITROGEN (MG/DL) IN SER/PLAS: 57 MG/DL (ref 6–23)

## 2023-08-23 ENCOUNTER — HOSPITAL ENCOUNTER (OUTPATIENT)
Dept: DATA CONVERSION | Facility: HOSPITAL | Age: 63
End: 2023-08-23
Attending: RADIOLOGY | Admitting: RADIOLOGY
Payer: MEDICAID

## 2023-08-23 ENCOUNTER — PATIENT OUTREACH (OUTPATIENT)
Dept: CARE COORDINATION | Facility: CLINIC | Age: 63
End: 2023-08-23

## 2023-08-23 DIAGNOSIS — M86.271 SUBACUTE OSTEOMYELITIS, RIGHT ANKLE AND FOOT (MULTI): ICD-10-CM

## 2023-08-23 NOTE — PROGRESS NOTES
Call placed regarding 90 day post discharge follow up call.  At time of outreach call the patient feels as if their condition has improved since initial visit with PCP or specialist.  Pt states that she had her port removed today and she was started on an oral antibiotic. Pt reports she has been doing hyperbaric treatments. Pt states she has been following up with infectious disease and orthopedic specialists.   Pt denies any questions for PCP at this time.   Pt declines any needs at this time. Pt encouraged to call if questions arise.

## 2023-08-29 DIAGNOSIS — G47.00 INSOMNIA, UNSPECIFIED TYPE: Primary | ICD-10-CM

## 2023-08-29 NOTE — TELEPHONE ENCOUNTER
Pt called req rf amitriptyline (Elavil) 25 mg tablet   She loses her ins on Thurs 08/31 so can we please send if possible before then  Thanks  maegan

## 2023-08-30 RX ORDER — AMITRIPTYLINE HYDROCHLORIDE 25 MG/1
25 TABLET, FILM COATED ORAL NIGHTLY
Qty: 90 TABLET | Refills: 3 | Status: SHIPPED | OUTPATIENT
Start: 2023-08-30

## 2023-09-28 ENCOUNTER — OFFICE VISIT (OUTPATIENT)
Dept: PRIMARY CARE | Facility: CLINIC | Age: 63
End: 2023-09-28
Payer: MEDICAID

## 2023-09-28 VITALS
HEART RATE: 86 BPM | WEIGHT: 151 LBS | RESPIRATION RATE: 18 BRPM | SYSTOLIC BLOOD PRESSURE: 122 MMHG | DIASTOLIC BLOOD PRESSURE: 82 MMHG | HEIGHT: 62 IN | TEMPERATURE: 97 F | BODY MASS INDEX: 27.79 KG/M2 | OXYGEN SATURATION: 98 %

## 2023-09-28 DIAGNOSIS — N18.4 STAGE 4 CHRONIC KIDNEY DISEASE (MULTI): ICD-10-CM

## 2023-09-28 DIAGNOSIS — I82.431 ACUTE DEEP VEIN THROMBOSIS (DVT) OF POPLITEAL VEIN OF RIGHT LOWER EXTREMITY (MULTI): Primary | ICD-10-CM

## 2023-09-28 PROCEDURE — 3074F SYST BP LT 130 MM HG: CPT | Performed by: FAMILY MEDICINE

## 2023-09-28 PROCEDURE — 1036F TOBACCO NON-USER: CPT | Performed by: FAMILY MEDICINE

## 2023-09-28 PROCEDURE — 3079F DIAST BP 80-89 MM HG: CPT | Performed by: FAMILY MEDICINE

## 2023-09-28 PROCEDURE — 99214 OFFICE O/P EST MOD 30 MIN: CPT | Performed by: FAMILY MEDICINE

## 2023-09-28 RX ORDER — RIVAROXABAN 15 MG/1
TABLET, FILM COATED ORAL
COMMUNITY
End: 2023-09-28

## 2023-09-28 RX ORDER — DOXYCYCLINE 100 MG/1
100 TABLET ORAL 2 TIMES DAILY
COMMUNITY
End: 2023-10-30 | Stop reason: HOSPADM

## 2023-09-28 ASSESSMENT — ENCOUNTER SYMPTOMS
SHORTNESS OF BREATH: 0
HEADACHES: 0

## 2023-09-28 NOTE — PROGRESS NOTES
"Subjective     Estephania Hernandez is a 63 y.o. female who presents for Hospital Follow-up.    HPI     Pt had an outpatient venous duplex ultrasound ordered by her wound care specialist due to increased leg swelling and redness on 9/26.  The ultrasound noted an acute DVT in right popliteal vein with extension to the peroneal and posterior tibial veins.  She was advised to go to the ER for treatment.  She went to [facility] ER, had labwork which included a d dimer which was elevated at 2036.  She was not having any trouble breathing or chest pain so ER did not get CT chest.  Pt was started on xarelto and advised to follow up with PCP.  She does have CKD stage 4, sees nephrology.  She has history of one kidney (born with one kidney).     Her recent BMP showed creatinine of 2.71, GFR of 19, slightly higher than her baseline creatinine.      Of note, she has hx of PE in 2022 secondary to her hx of right tibia fracture.  She was on eliquis in the past.  She was admitted to [place] on 2/28/23 , discharged 3/6/23.   She had surgery on her right leg by Dr. Fernandez Melissa MD for infection associated with internal fixation device of bone of lower leg.     She started the xarelto , no bleeding issues.      Her nephrologist is Dr. Tobar.      Review of Systems   Respiratory:  Negative for shortness of breath.    Cardiovascular:  Negative for chest pain.   Neurological:  Negative for headaches.       Objective     Vitals:    09/28/23 1140   BP: 122/82   BP Location: Right arm   Patient Position: Sitting   Pulse: 86   Resp: 18   Temp: 36.1 °C (97 °F)   TempSrc: Temporal   SpO2: 98%   Weight: 68.5 kg (151 lb)   Height: 1.575 m (5' 2\")        Current Outpatient Medications   Medication Instructions    amitriptyline (ELAVIL) 25 mg, oral, Nightly    apixaban (ELIQUIS) 5 mg, oral, 2 times daily    apixaban (ELIQUIS) 2.5 mg, oral, 2 times daily    aspirin 81 mg EC tablet TAKE ONE TABLET BY MOUTH TWO TIMES A DAY. ONCE COMPLETED 1 MONTH OF " TWICE DAILY DOSING MAY THEN RESUME YOUR NORMAL ONCE A DAY DOSE OF THIS M    cholecalciferol (Vitamin D-3) 50 mcg (2,000 unit) capsule 1 capsule, oral, Daily    doxycycline (ADOXA) 100 mg, oral, 2 times daily    fexofenadine (Allegra) 180 mg tablet 1 tablet, oral, Daily    leflunomide (ARAVA) 20 mg, oral, Daily    lisinopril 5 mg, oral, Daily    metoprolol tartrate (LOPRESSOR) 50 mg, oral, 2 times daily    NIFEdipine ER (ADALAT CC) 90 mg, oral, 2 times daily    omega-3 fatty acids-fish oil 300-1,000 mg capsule 1,200 mg, oral    omeprazole (PriLOSEC) 20 mg DR capsule 1 capsule, oral, Daily    predniSONE (DELTASONE) 27 mg, oral, Daily    Repatha SureClick 140 mg/mL injection INJECT 140MG SUBCUTEANOUSLY EVERY 2 WEEKS        Past Surgical History:   Procedure Laterality Date    OTHER SURGICAL HISTORY  10/21/2022    Mastectomy    OTHER SURGICAL HISTORY  10/21/2022    Lower leg fracture repair    OTHER SURGICAL HISTORY  10/21/2022    Ankle fracture repair    OTHER SURGICAL HISTORY  10/21/2022    Lower leg fracture repair    OTHER SURGICAL HISTORY  10/21/2022    Colonoscopy    OTHER SURGICAL HISTORY  10/21/2022    Temporal artery biopsy    OTHER SURGICAL HISTORY  10/21/2022    Mastectomy bilateral    OTHER SURGICAL HISTORY  10/21/2022    Appendectomy        Social History     Tobacco Use    Smoking status: Never    Smokeless tobacco: Never        No family history on file.     Immunization History   Administered Date(s) Administered    Pfizer Purple Cap SARS-CoV-2 04/05/2021, 04/26/2021, 12/28/2021        Physical Exam  Vitals reviewed.   Constitutional:       General: She is not in acute distress.     Appearance: Normal appearance. She is well-developed.   HENT:      Head: Normocephalic and atraumatic.   Eyes:      General: Lids are normal.      Conjunctiva/sclera:      Right eye: Right conjunctiva is not injected.      Left eye: Left conjunctiva is not injected.   Cardiovascular:      Rate and Rhythm: Normal rate and  regular rhythm.      Heart sounds: No murmur heard.  Pulmonary:      Effort: Pulmonary effort is normal. No respiratory distress.      Breath sounds: Normal breath sounds. No wheezing, rhonchi or rales.   Skin:     General: Skin is warm and dry.      Findings: No rash.      Comments: Right dorsal foot/ankle - edema, nonpitting.     Right lower leg surgical scar/wound.      No calf/thigh erythema, swelling noted.  Nontender    Neurological:      Mental Status: She is alert and oriented to person, place, and time. Mental status is at baseline.   Psychiatric:         Mood and Affect: Mood normal.         Behavior: Behavior normal.         Problem List Items Addressed This Visit       Stage 4 chronic kidney disease (CMS/[facility])     Other Visit Diagnoses       Acute deep vein thrombosis (DVT) of popliteal vein of right lower extremity (CMS/[facility])    -  Primary    Relevant Medications    apixaban (Eliquis) 5 mg tablet    apixaban (Eliquis) 2.5 mg tablet    Other Relevant Orders    Referral to Benign Hematology            Assessment/Plan     DVT -  right popliteal vein with extension to the peroneal and posterior tibial veins.  Currently on xarelto.  I will switch her to eliquis, 5 mg BID x 7 days, then 2.5 mg BID x 3 months or indefinately depending on what her hematologist recommends.  I want her to follow up with Dr. Valles.  This is her 1st DVT but she had a PE in 2022, s/p six months of eliquis.  No bleeding concerns.  She has CKD stage 4, therefore her eliquis will be renal dosed.  I also want her to follow up with her nephrologist.      CKD 4 - see nephrologist.    Hx of right tibia , fibula fracture - s/p ortho surgery    right lower leg wound - sees wound care clinic and ID.  Pt is on doxycycline through ID due to leg wound.  Pt advised to follow up with wound care was well.     Reviewed ER records, lab results    Follow up 1 month or sooner if needed    Go the the nearest Emergency Department if your condition worsens  significantly or becomes life-threatening.

## 2023-09-29 VITALS — WEIGHT: 150.13 LBS | BODY MASS INDEX: 27.63 KG/M2 | HEIGHT: 62 IN

## 2023-10-01 PROBLEM — M31.6 TEMPORAL ARTERITIS (MULTI): Status: ACTIVE | Noted: 2023-10-01

## 2023-10-01 PROBLEM — H69.93 DYSFUNCTION OF BOTH EUSTACHIAN TUBES: Status: ACTIVE | Noted: 2023-10-01

## 2023-10-01 PROBLEM — E78.01 HETEROZYGOUS FAMILIAL HYPERCHOLESTEROLEMIA: Status: ACTIVE | Noted: 2023-10-01

## 2023-10-01 PROBLEM — R79.89 ELEVATED TROPONIN LEVEL NOT DUE TO ACUTE CORONARY SYNDROME: Status: ACTIVE | Noted: 2023-10-01

## 2023-10-01 PROBLEM — E87.5 HYPERKALEMIA: Status: ACTIVE | Noted: 2023-10-01

## 2023-10-01 PROBLEM — I63.9 CVA (CEREBRAL VASCULAR ACCIDENT) (MULTI): Status: ACTIVE | Noted: 2023-10-01

## 2023-10-01 PROBLEM — M85.80 OSTEOPENIA: Status: ACTIVE | Noted: 2023-10-01

## 2023-10-01 PROBLEM — T14.8XXA OPEN WOUND: Status: ACTIVE | Noted: 2023-10-01

## 2023-10-01 PROBLEM — H92.10 OTORRHEA: Status: ACTIVE | Noted: 2023-10-01

## 2023-10-01 PROBLEM — R94.39 ABNORMAL STRESS TEST: Status: ACTIVE | Noted: 2023-10-01

## 2023-10-01 PROBLEM — I1A.0 RESISTANT HYPERTENSION: Status: ACTIVE | Noted: 2023-10-01

## 2023-10-01 PROBLEM — I82.409 DVT (DEEP VENOUS THROMBOSIS) (MULTI): Status: ACTIVE | Noted: 2023-10-01

## 2023-10-01 PROBLEM — J96.00 ACUTE RESPIRATORY FAILURE (MULTI): Status: ACTIVE | Noted: 2023-10-01

## 2023-10-01 PROBLEM — E66.3 OVERWEIGHT WITH BODY MASS INDEX (BMI) OF 28 TO 28.9 IN ADULT: Status: ACTIVE | Noted: 2023-10-01

## 2023-10-01 PROBLEM — R41.82 ALTERED MENTAL STATUS: Status: ACTIVE | Noted: 2023-05-18

## 2023-10-01 PROBLEM — R94.31 ABNORMAL EKG: Status: ACTIVE | Noted: 2023-10-01

## 2023-10-01 PROBLEM — N17.9 ACUTE KIDNEY INJURY (CMS-HCC): Status: ACTIVE | Noted: 2023-10-01

## 2023-10-01 PROBLEM — S82.401A CLOSED FRACTURE OF RIGHT FIBULA AND TIBIA: Status: ACTIVE | Noted: 2023-10-01

## 2023-10-01 PROBLEM — S82.201A CLOSED FRACTURE OF RIGHT FIBULA AND TIBIA: Status: ACTIVE | Noted: 2023-10-01

## 2023-10-01 PROBLEM — I24.89 DEMAND ISCHEMIA OF MYOCARDIUM (MULTI): Status: ACTIVE | Noted: 2023-10-01

## 2023-10-01 RX ORDER — CEFTAZIDIME 2 G/1
INJECTION, POWDER, FOR SOLUTION INTRAVENOUS
Status: ON HOLD | COMMUNITY
Start: 2023-06-08 | End: 2023-10-28 | Stop reason: ALTCHOICE

## 2023-10-01 RX ORDER — DAPTOMYCIN 50 MG/ML
INJECTION, POWDER, LYOPHILIZED, FOR SOLUTION INTRAVENOUS
Status: ON HOLD | COMMUNITY
Start: 2023-05-30 | End: 2023-10-28 | Stop reason: ALTCHOICE

## 2023-10-01 RX ORDER — CIPROFLOXACIN AND DEXAMETHASONE 3; 1 MG/ML; MG/ML
4 SUSPENSION/ DROPS AURICULAR (OTIC) 2 TIMES DAILY
Status: ON HOLD | COMMUNITY
Start: 2023-08-21 | End: 2023-10-28 | Stop reason: ALTCHOICE

## 2023-10-01 RX ORDER — ACETAMINOPHEN 325 MG/1
2 TABLET ORAL EVERY 4 HOURS PRN
COMMUNITY

## 2023-10-01 RX ORDER — POLYETHYLENE GLYCOL 3350, SODIUM CHLORIDE, SODIUM BICARBONATE, POTASSIUM CHLORIDE 420; 11.2; 5.72; 1.48 G/4L; G/4L; G/4L; G/4L
POWDER, FOR SOLUTION ORAL
Status: ON HOLD | COMMUNITY
Start: 2022-11-21 | End: 2023-10-28 | Stop reason: ALTCHOICE

## 2023-10-02 ENCOUNTER — OFFICE VISIT (OUTPATIENT)
Dept: WOUND CARE | Facility: CLINIC | Age: 63
End: 2023-10-02
Payer: MEDICAID

## 2023-10-02 ENCOUNTER — OFFICE VISIT (OUTPATIENT)
Dept: HEMATOLOGY/ONCOLOGY | Facility: CLINIC | Age: 63
End: 2023-10-02
Payer: MEDICAID

## 2023-10-02 VITALS
DIASTOLIC BLOOD PRESSURE: 73 MMHG | BODY MASS INDEX: 30.93 KG/M2 | TEMPERATURE: 97.7 F | RESPIRATION RATE: 16 BRPM | WEIGHT: 153.44 LBS | OXYGEN SATURATION: 96 % | HEIGHT: 59 IN | HEART RATE: 97 BPM | SYSTOLIC BLOOD PRESSURE: 115 MMHG

## 2023-10-02 DIAGNOSIS — M80.00XG AGE-RELATED OSTEOPOROSIS WITH CURRENT PATHOLOGICAL FRACTURE WITH DELAYED HEALING, SUBSEQUENT ENCOUNTER: ICD-10-CM

## 2023-10-02 DIAGNOSIS — M31.6 GCA (GIANT CELL ARTERITIS) (MULTI): ICD-10-CM

## 2023-10-02 DIAGNOSIS — Z79.4 TYPE 2 DIABETES MELLITUS WITH DIABETIC DERMATITIS, WITH LONG-TERM CURRENT USE OF INSULIN (MULTI): ICD-10-CM

## 2023-10-02 DIAGNOSIS — G47.09 OTHER INSOMNIA: ICD-10-CM

## 2023-10-02 DIAGNOSIS — N18.4 STAGE 4 CHRONIC KIDNEY DISEASE (MULTI): ICD-10-CM

## 2023-10-02 DIAGNOSIS — I50.20 HFREF (HEART FAILURE WITH REDUCED EJECTION FRACTION) (MULTI): ICD-10-CM

## 2023-10-02 DIAGNOSIS — I10 HYPERTENSION, ESSENTIAL, BENIGN: ICD-10-CM

## 2023-10-02 DIAGNOSIS — J96.01 ACUTE RESPIRATORY FAILURE WITH HYPOXIA (MULTI): ICD-10-CM

## 2023-10-02 DIAGNOSIS — E11.620 TYPE 2 DIABETES MELLITUS WITH DIABETIC DERMATITIS, WITH LONG-TERM CURRENT USE OF INSULIN (MULTI): ICD-10-CM

## 2023-10-02 DIAGNOSIS — M35.3 POLYMYALGIA RHEUMATICA (MULTI): ICD-10-CM

## 2023-10-02 DIAGNOSIS — I82.431 ACUTE DEEP VEIN THROMBOSIS (DVT) OF POPLITEAL VEIN OF RIGHT LOWER EXTREMITY (MULTI): Primary | ICD-10-CM

## 2023-10-02 PROCEDURE — 4010F ACE/ARB THERAPY RXD/TAKEN: CPT | Performed by: INTERNAL MEDICINE

## 2023-10-02 PROCEDURE — 99215 OFFICE O/P EST HI 40 MIN: CPT | Performed by: INTERNAL MEDICINE

## 2023-10-02 PROCEDURE — 3078F DIAST BP <80 MM HG: CPT | Performed by: INTERNAL MEDICINE

## 2023-10-02 PROCEDURE — 99183 HYPERBARIC OXYGEN THERAPY: CPT | Performed by: PLASTIC SURGERY

## 2023-10-02 PROCEDURE — 3074F SYST BP LT 130 MM HG: CPT | Performed by: INTERNAL MEDICINE

## 2023-10-02 PROCEDURE — 1036F TOBACCO NON-USER: CPT | Performed by: INTERNAL MEDICINE

## 2023-10-02 PROCEDURE — G0277 HBOT, FULL BODY CHAMBER, 30M: HCPCS

## 2023-10-02 ASSESSMENT — PAIN SCALES - GENERAL: PAINLEVEL: 0-NO PAIN

## 2023-10-02 NOTE — PROGRESS NOTES
"Patient ID: Estephania Hernandez is a 63 y.o. female.    Subjective    HPI  I threw a DVT a few months ago    I last saw here on 12/20/2022--and cleared her to come off eliquis at that time.  Since then she threw another DVT, now in the right leg.      Objective    BSA: 1.71 meters squared  /73   Pulse 97   Temp 36.5 °C (97.7 °F)   Resp 16   Ht 1.508 m (4' 11.37\")   Wt 69.6 kg (153 lb 7 oz)   SpO2 96%   BMI 30.61 kg/m²      Physical Exam  Vitals reviewed.   Constitutional:       Appearance: Normal appearance.   HENT:      Head: Normocephalic.      Mouth/Throat:      Mouth: Mucous membranes are moist.   Eyes:      Extraocular Movements: Extraocular movements intact.      Pupils: Pupils are equal, round, and reactive to light.   Cardiovascular:      Rate and Rhythm: Normal rate and regular rhythm.      Heart sounds: Normal heart sounds.   Pulmonary:      Breath sounds: Normal breath sounds.   Abdominal:      General: Bowel sounds are normal.      Palpations: Abdomen is soft.   Musculoskeletal:         General: Normal range of motion.      Cervical back: Normal range of motion.      Comments: Right leg is in a boot  There is a cooling catheter in place   Skin:     General: Skin is warm.      Findings: Rash present.   Neurological:      General: No focal deficit present.      Mental Status: She is alert and oriented to person, place, and time.   Psychiatric:         Mood and Affect: Mood normal.     Constitutional: positive for fatigue   Eyes: negative  Ears, nose, mouth, throat, and face: negative  Respiratory: negative  Cardiovascular: negative  Integument/breasts: negative  Gastrointestinal: negative  Genitourinary:negative  Musculoskeletal:positive for muscle weakness and stiff joints  Neurological: negative  Behavioral/Psych: negative  Hematologic/lymphatic: negative  Endocrine: negative  Allergic/Immunologic: negative    Performance Status:  Symptomatic; in bed <50% of the day      Assessment/Plan    1) " recurrent DVT  -had prior provoked DVT/PE (orthopedic surgery), was on fixed duration course of eliquis--I cleared her to come off eliquis and switch to daily baby ASA  -she did understand that any VTE event, regardless of the circumstances, becomes its own risk factor for recurrent VTE  -she also has other risk factors for VTE--persistent relative immobility, chronic inflammatory disorder (PMR and giant cell arteritis)  -she started having problems with her right leg earlier this year (poorly healing wound)--she had PVRs done of the right leg showing no evidence of arterial occlusive disease  -on 5/19/2023 she had a CT of her right leg showing s/p ORIF of distal tibula and fibula fractures, deep soft tissue ulceration suspicious for early osteomyelitis  -doppler of legs done on 5/19/2023 showed no evidence of acute DVT in right leg but cannot rule out thrombus in iliac vein due to dressing; no evidence of acute DVT in left leg--cannot rule out thrombus in iliac and common femoral veins due to cooling catheter  5/23/2023 she had MRI of the tibia showing wound tract extending down into the bone  -8/14/2023 she had CT of right leg showing improvement of deep soft tissue ulceration along medial aspect of distal calf  -on 9/26/2023 her podiatrist sent her for a doppler which showed no DVT in left leg but there was a nonocclusive thrombus in the right popliteal vein extending to the right posterior tibial and peroneal veins  -she was started by Dr Long on eliquis 5 mg PO BID x 7 days to switch then to 2.5 mg PO BID  -as this was a non-occlusive below knee DVT with low rate of propagation, I agree with start of lower intensity anticoagulation  -she does not have easily modifiable risk factors for recurrent VTE, and 2.5 mg BID eliquis is actually FDA approved as extended (maintenance) dosing for those are considered at high risk for recurrent VTE  -she first needs to be anticoagulated for minimum 3 months  -we will  recheck a duplex at the 3 month anniversary      2) osteoporosis  -on IV reclast    3) polymyalgia rheumatica  -on leflunomide  -on prednisone    4) hypertension  -on metoprolol  -on nifedipine    5) insomnia  -on elavil    Cancer Staging   No matching staging information was found for the patient.    Oncology History    No history exists.        Problem List Items Addressed This Visit             ICD-10-CM       Cardiac and Vasculature    HFrEF (heart failure with reduced ejection fraction) (CMS/[facility]) I50.20       Coag and Thromboembolic    DVT (deep venous thrombosis) (CMS/[facility]) - Primary I82.409    Relevant Orders    Vascular US lower extremity venous duplex bilateral    Clinic Appointment Request Follow Up; LUCAS VALLES; [facility] [id number]       Endocrine/Metabolic    Type 2 diabetes mellitus with diabetic dermatitis, with long-term current use of insulin (CMS/[facility]) E11.620, Z79.4       Genitourinary and Reproductive    Stage 4 chronic kidney disease (CMS/[facility]) N18.4       Pulmonary and Pneumonias    Acute respiratory failure (CMS/[facility]) J96.00     Other Visit Diagnoses         Codes    Polymyalgia rheumatica (CMS/[facility])     M35.3                 Lucas Valles MD

## 2023-10-03 ENCOUNTER — OFFICE VISIT (OUTPATIENT)
Dept: WOUND CARE | Facility: CLINIC | Age: 63
End: 2023-10-03
Payer: MEDICAID

## 2023-10-03 PROCEDURE — 99183 HYPERBARIC OXYGEN THERAPY: CPT | Performed by: SURGERY

## 2023-10-03 PROCEDURE — 11044 DBRDMT BONE 1ST 20 SQ CM/<: CPT

## 2023-10-03 PROCEDURE — G0277 HBOT, FULL BODY CHAMBER, 30M: HCPCS

## 2023-10-04 ENCOUNTER — OFFICE VISIT (OUTPATIENT)
Dept: WOUND CARE | Facility: CLINIC | Age: 63
End: 2023-10-04
Payer: MEDICAID

## 2023-10-04 PROCEDURE — G0277 HBOT, FULL BODY CHAMBER, 30M: HCPCS

## 2023-10-05 ENCOUNTER — OFFICE VISIT (OUTPATIENT)
Dept: WOUND CARE | Facility: CLINIC | Age: 63
End: 2023-10-05
Payer: MEDICAID

## 2023-10-05 PROCEDURE — 1036F TOBACCO NON-USER: CPT | Performed by: PLASTIC SURGERY

## 2023-10-05 PROCEDURE — 99183 HYPERBARIC OXYGEN THERAPY: CPT

## 2023-10-05 PROCEDURE — 4010F ACE/ARB THERAPY RXD/TAKEN: CPT | Performed by: PLASTIC SURGERY

## 2023-10-05 PROCEDURE — 99183 HYPERBARIC OXYGEN THERAPY: CPT | Performed by: PLASTIC SURGERY

## 2023-10-05 PROCEDURE — G0277 HBOT, FULL BODY CHAMBER, 30M: HCPCS

## 2023-10-06 ENCOUNTER — OFFICE VISIT (OUTPATIENT)
Dept: WOUND CARE | Facility: CLINIC | Age: 63
End: 2023-10-06
Payer: MEDICAID

## 2023-10-07 PROBLEM — E11.620 TYPE 2 DIABETES MELLITUS WITH DIABETIC DERMATITIS, WITH LONG-TERM CURRENT USE OF INSULIN (MULTI): Status: ACTIVE | Noted: 2023-10-07

## 2023-10-07 PROBLEM — Z79.4 TYPE 2 DIABETES MELLITUS WITH DIABETIC DERMATITIS, WITH LONG-TERM CURRENT USE OF INSULIN (MULTI): Status: ACTIVE | Noted: 2023-10-07

## 2023-10-09 ENCOUNTER — OFFICE VISIT (OUTPATIENT)
Dept: WOUND CARE | Facility: CLINIC | Age: 63
End: 2023-10-09
Payer: MEDICAID

## 2023-10-09 PROCEDURE — 4010F ACE/ARB THERAPY RXD/TAKEN: CPT | Performed by: PLASTIC SURGERY

## 2023-10-09 PROCEDURE — 1036F TOBACCO NON-USER: CPT | Performed by: PLASTIC SURGERY

## 2023-10-09 PROCEDURE — 99183 HYPERBARIC OXYGEN THERAPY: CPT | Performed by: PLASTIC SURGERY

## 2023-10-10 ENCOUNTER — OFFICE VISIT (OUTPATIENT)
Dept: WOUND CARE | Facility: CLINIC | Age: 63
End: 2023-10-10
Payer: MEDICAID

## 2023-10-10 PROCEDURE — 11042 DBRDMT SUBQ TIS 1ST 20SQCM/<: CPT

## 2023-10-10 PROCEDURE — 1036F TOBACCO NON-USER: CPT | Performed by: SURGERY

## 2023-10-10 PROCEDURE — 11042 DBRDMT SUBQ TIS 1ST 20SQCM/<: CPT | Performed by: SURGERY

## 2023-10-10 PROCEDURE — 4010F ACE/ARB THERAPY RXD/TAKEN: CPT | Performed by: SURGERY

## 2023-10-10 PROCEDURE — 99183 HYPERBARIC OXYGEN THERAPY: CPT | Performed by: SURGERY

## 2023-10-10 PROCEDURE — G0277 HBOT, FULL BODY CHAMBER, 30M: HCPCS

## 2023-10-11 ENCOUNTER — OFFICE VISIT (OUTPATIENT)
Dept: WOUND CARE | Facility: CLINIC | Age: 63
End: 2023-10-11
Payer: MEDICAID

## 2023-10-11 PROCEDURE — G0277 HBOT, FULL BODY CHAMBER, 30M: HCPCS

## 2023-10-12 ENCOUNTER — APPOINTMENT (OUTPATIENT)
Dept: ORTHOPEDIC SURGERY | Facility: CLINIC | Age: 63
End: 2023-10-12
Payer: MEDICAID

## 2023-10-12 ENCOUNTER — OFFICE VISIT (OUTPATIENT)
Dept: WOUND CARE | Facility: CLINIC | Age: 63
End: 2023-10-12
Payer: MEDICAID

## 2023-10-12 PROCEDURE — G0277 HBOT, FULL BODY CHAMBER, 30M: HCPCS

## 2023-10-12 PROCEDURE — 1036F TOBACCO NON-USER: CPT | Performed by: PLASTIC SURGERY

## 2023-10-12 PROCEDURE — 4010F ACE/ARB THERAPY RXD/TAKEN: CPT | Performed by: PLASTIC SURGERY

## 2023-10-12 PROCEDURE — 99183 HYPERBARIC OXYGEN THERAPY: CPT | Performed by: PLASTIC SURGERY

## 2023-10-13 ENCOUNTER — OFFICE VISIT (OUTPATIENT)
Dept: WOUND CARE | Facility: CLINIC | Age: 63
End: 2023-10-13
Payer: MEDICAID

## 2023-10-13 PROCEDURE — 4010F ACE/ARB THERAPY RXD/TAKEN: CPT | Performed by: SURGERY

## 2023-10-13 PROCEDURE — 99183 HYPERBARIC OXYGEN THERAPY: CPT | Performed by: SURGERY

## 2023-10-13 PROCEDURE — 1036F TOBACCO NON-USER: CPT | Performed by: SURGERY

## 2023-10-13 PROCEDURE — G0277 HBOT, FULL BODY CHAMBER, 30M: HCPCS

## 2023-10-16 ENCOUNTER — OFFICE VISIT (OUTPATIENT)
Dept: WOUND CARE | Facility: CLINIC | Age: 63
End: 2023-10-16
Payer: MEDICAID

## 2023-10-16 PROCEDURE — 99183 HYPERBARIC OXYGEN THERAPY: CPT | Performed by: PLASTIC SURGERY

## 2023-10-16 PROCEDURE — G0277 HBOT, FULL BODY CHAMBER, 30M: HCPCS

## 2023-10-17 ENCOUNTER — OFFICE VISIT (OUTPATIENT)
Dept: WOUND CARE | Facility: CLINIC | Age: 63
End: 2023-10-17
Payer: MEDICAID

## 2023-10-17 PROCEDURE — 99183 HYPERBARIC OXYGEN THERAPY: CPT | Performed by: SURGERY

## 2023-10-17 PROCEDURE — G0277 HBOT, FULL BODY CHAMBER, 30M: HCPCS

## 2023-10-17 PROCEDURE — 11044 DBRDMT BONE 1ST 20 SQ CM/<: CPT

## 2023-10-18 ENCOUNTER — OFFICE VISIT (OUTPATIENT)
Dept: WOUND CARE | Facility: CLINIC | Age: 63
End: 2023-10-18
Payer: MEDICAID

## 2023-10-18 PROCEDURE — G0277 HBOT, FULL BODY CHAMBER, 30M: HCPCS

## 2023-10-19 ENCOUNTER — OFFICE VISIT (OUTPATIENT)
Dept: WOUND CARE | Facility: CLINIC | Age: 63
End: 2023-10-19
Payer: MEDICAID

## 2023-10-19 PROCEDURE — G0277 HBOT, FULL BODY CHAMBER, 30M: HCPCS

## 2023-10-19 PROCEDURE — 99183 HYPERBARIC OXYGEN THERAPY: CPT | Performed by: PLASTIC SURGERY

## 2023-10-20 ENCOUNTER — OFFICE VISIT (OUTPATIENT)
Dept: WOUND CARE | Facility: CLINIC | Age: 63
End: 2023-10-20
Payer: MEDICAID

## 2023-10-20 PROCEDURE — G0277 HBOT, FULL BODY CHAMBER, 30M: HCPCS

## 2023-10-20 PROCEDURE — 99183 HYPERBARIC OXYGEN THERAPY: CPT | Performed by: SURGERY

## 2023-10-23 ENCOUNTER — OFFICE VISIT (OUTPATIENT)
Dept: WOUND CARE | Facility: CLINIC | Age: 63
End: 2023-10-23
Payer: MEDICAID

## 2023-10-23 PROCEDURE — G0277 HBOT, FULL BODY CHAMBER, 30M: HCPCS

## 2023-10-23 PROCEDURE — 99183 HYPERBARIC OXYGEN THERAPY: CPT | Performed by: PLASTIC SURGERY

## 2023-10-24 ENCOUNTER — OFFICE VISIT (OUTPATIENT)
Dept: WOUND CARE | Facility: CLINIC | Age: 63
End: 2023-10-24
Payer: MEDICAID

## 2023-10-24 PROCEDURE — G0277 HBOT, FULL BODY CHAMBER, 30M: HCPCS

## 2023-10-24 PROCEDURE — 11044 DBRDMT BONE 1ST 20 SQ CM/<: CPT

## 2023-10-24 PROCEDURE — 99183 HYPERBARIC OXYGEN THERAPY: CPT | Performed by: SURGERY

## 2023-10-25 ENCOUNTER — OFFICE VISIT (OUTPATIENT)
Dept: WOUND CARE | Facility: CLINIC | Age: 63
End: 2023-10-25
Payer: MEDICAID

## 2023-10-25 PROCEDURE — G0277 HBOT, FULL BODY CHAMBER, 30M: HCPCS

## 2023-10-26 ENCOUNTER — OFFICE VISIT (OUTPATIENT)
Dept: WOUND CARE | Facility: CLINIC | Age: 63
End: 2023-10-26
Payer: MEDICAID

## 2023-10-26 PROCEDURE — G0277 HBOT, FULL BODY CHAMBER, 30M: HCPCS

## 2023-10-26 PROCEDURE — 99183 HYPERBARIC OXYGEN THERAPY: CPT | Performed by: PLASTIC SURGERY

## 2023-10-27 ENCOUNTER — APPOINTMENT (OUTPATIENT)
Dept: RADIOLOGY | Facility: HOSPITAL | Age: 63
End: 2023-10-27
Payer: MEDICAID

## 2023-10-27 ENCOUNTER — HOSPITAL ENCOUNTER (INPATIENT)
Facility: HOSPITAL | Age: 63
LOS: 2 days | Discharge: HOME | End: 2023-10-30
Attending: EMERGENCY MEDICINE | Admitting: INTERNAL MEDICINE
Payer: MEDICAID

## 2023-10-27 ENCOUNTER — OFFICE VISIT (OUTPATIENT)
Dept: WOUND CARE | Facility: CLINIC | Age: 63
End: 2023-10-27
Payer: MEDICAID

## 2023-10-27 DIAGNOSIS — L03.115 CELLULITIS OF LEG, RIGHT: Primary | ICD-10-CM

## 2023-10-27 DIAGNOSIS — R60.0 LOCALIZED EDEMA: ICD-10-CM

## 2023-10-27 DIAGNOSIS — T14.8XXA OPEN WOUND: ICD-10-CM

## 2023-10-27 DIAGNOSIS — L03.115 CELLULITIS OF RIGHT LOWER EXTREMITY: ICD-10-CM

## 2023-10-27 DIAGNOSIS — I70.213 ATHEROSCLEROSIS OF NATIVE ARTERIES OF EXTREMITIES WITH INTERMITTENT CLAUDICATION, BILATERAL LEGS (CMS-HCC): ICD-10-CM

## 2023-10-27 DIAGNOSIS — I82.409 DEEP VEIN THROMBOSIS (DVT) OF LOWER EXTREMITY, UNSPECIFIED CHRONICITY, UNSPECIFIED LATERALITY, UNSPECIFIED VEIN (MULTI): ICD-10-CM

## 2023-10-27 PROBLEM — L03.116 CELLULITIS OF LEFT LOWER EXTREMITY: Status: ACTIVE | Noted: 2023-10-27

## 2023-10-27 LAB
ERYTHROCYTE [DISTWIDTH] IN BLOOD BY AUTOMATED COUNT: 14.7 % (ref 11.5–14.5)
HCT VFR BLD AUTO: 43.8 % (ref 36–46)
HGB BLD-MCNC: 14.1 G/DL (ref 12–16)
MCH RBC QN AUTO: 29.6 PG (ref 26–34)
MCHC RBC AUTO-ENTMCNC: 32.2 G/DL (ref 32–36)
MCV RBC AUTO: 92 FL (ref 80–100)
NRBC BLD-RTO: 0 /100 WBCS (ref 0–0)
PLATELET # BLD AUTO: 361 X10*3/UL (ref 150–450)
PMV BLD AUTO: 9.9 FL (ref 7.5–11.5)
RBC # BLD AUTO: 4.76 X10*6/UL (ref 4–5.2)
WBC # BLD AUTO: 18.8 X10*3/UL (ref 4.4–11.3)

## 2023-10-27 PROCEDURE — 87075 CULTR BACTERIA EXCEPT BLOOD: CPT | Mod: STJLAB

## 2023-10-27 PROCEDURE — 73620 X-RAY EXAM OF FOOT: CPT | Mod: RT

## 2023-10-27 PROCEDURE — 36415 COLL VENOUS BLD VENIPUNCTURE: CPT | Performed by: EMERGENCY MEDICINE

## 2023-10-27 PROCEDURE — 2500000004 HC RX 250 GENERAL PHARMACY W/ HCPCS (ALT 636 FOR OP/ED)

## 2023-10-27 PROCEDURE — 73620 X-RAY EXAM OF FOOT: CPT | Mod: RIGHT SIDE | Performed by: RADIOLOGY

## 2023-10-27 PROCEDURE — 99223 1ST HOSP IP/OBS HIGH 75: CPT | Performed by: INTERNAL MEDICINE

## 2023-10-27 PROCEDURE — 96374 THER/PROPH/DIAG INJ IV PUSH: CPT | Mod: 59

## 2023-10-27 PROCEDURE — G0277 HBOT, FULL BODY CHAMBER, 30M: HCPCS

## 2023-10-27 PROCEDURE — 99285 EMERGENCY DEPT VISIT HI MDM: CPT | Performed by: EMERGENCY MEDICINE

## 2023-10-27 PROCEDURE — 73590 X-RAY EXAM OF LOWER LEG: CPT | Mod: RIGHT SIDE | Performed by: RADIOLOGY

## 2023-10-27 PROCEDURE — 85027 COMPLETE CBC AUTOMATED: CPT | Performed by: EMERGENCY MEDICINE

## 2023-10-27 PROCEDURE — 36415 COLL VENOUS BLD VENIPUNCTURE: CPT

## 2023-10-27 PROCEDURE — 99183 HYPERBARIC OXYGEN THERAPY: CPT | Performed by: SURGERY

## 2023-10-27 PROCEDURE — 73590 X-RAY EXAM OF LOWER LEG: CPT | Mod: RT,FY,FR

## 2023-10-27 PROCEDURE — 96365 THER/PROPH/DIAG IV INF INIT: CPT

## 2023-10-27 PROCEDURE — 87040 BLOOD CULTURE FOR BACTERIA: CPT | Mod: STJLAB

## 2023-10-27 RX ORDER — VANCOMYCIN HYDROCHLORIDE 750 MG/150ML
750 INJECTION, SOLUTION INTRAVENOUS ONCE
Status: DISCONTINUED | OUTPATIENT
Start: 2023-10-27 | End: 2023-10-27

## 2023-10-27 RX ORDER — VANCOMYCIN/0.9 % SOD CHLORIDE 750 MG/250
750 PLASTIC BAG, INJECTION (ML) INTRAVENOUS ONCE
Status: COMPLETED | OUTPATIENT
Start: 2023-10-27 | End: 2023-10-27

## 2023-10-27 RX ADMIN — Medication 750 MG: at 21:55

## 2023-10-27 RX ADMIN — PIPERACILLIN SODIUM AND TAZOBACTAM SODIUM 3.38 G: 3; .375 INJECTION, SOLUTION INTRAVENOUS at 23:15

## 2023-10-27 ASSESSMENT — LIFESTYLE VARIABLES
EVER FELT BAD OR GUILTY ABOUT YOUR DRINKING: NO
HAVE PEOPLE ANNOYED YOU BY CRITICIZING YOUR DRINKING: NO
EVER HAD A DRINK FIRST THING IN THE MORNING TO STEADY YOUR NERVES TO GET RID OF A HANGOVER: NO
HAVE YOU EVER FELT YOU SHOULD CUT DOWN ON YOUR DRINKING: NO
REASON UNABLE TO ASSESS: NO

## 2023-10-27 ASSESSMENT — COLUMBIA-SUICIDE SEVERITY RATING SCALE - C-SSRS
6. HAVE YOU EVER DONE ANYTHING, STARTED TO DO ANYTHING, OR PREPARED TO DO ANYTHING TO END YOUR LIFE?: NO
2. HAVE YOU ACTUALLY HAD ANY THOUGHTS OF KILLING YOURSELF?: NO
1. IN THE PAST MONTH, HAVE YOU WISHED YOU WERE DEAD OR WISHED YOU COULD GO TO SLEEP AND NOT WAKE UP?: NO

## 2023-10-27 ASSESSMENT — PAIN DESCRIPTION - LOCATION: LOCATION: FOOT

## 2023-10-27 ASSESSMENT — PAIN DESCRIPTION - PAIN TYPE: TYPE: ACUTE PAIN

## 2023-10-27 ASSESSMENT — PAIN - FUNCTIONAL ASSESSMENT: PAIN_FUNCTIONAL_ASSESSMENT: 0-10

## 2023-10-27 ASSESSMENT — PAIN DESCRIPTION - ORIENTATION: ORIENTATION: RIGHT

## 2023-10-27 ASSESSMENT — PAIN DESCRIPTION - DESCRIPTORS: DESCRIPTORS: NUMBNESS

## 2023-10-27 ASSESSMENT — PAIN DESCRIPTION - FREQUENCY: FREQUENCY: CONSTANT/CONTINUOUS

## 2023-10-27 ASSESSMENT — PAIN SCALES - GENERAL: PAINLEVEL_OUTOF10: 4

## 2023-10-27 NOTE — ED TRIAGE NOTES
Pt c/o right foot pain that worsened this morning. Pt has hx surgery, has wound vac, and was dx with blood clot in right leg 6 weeks ago and put on eliquis.

## 2023-10-28 ENCOUNTER — APPOINTMENT (OUTPATIENT)
Dept: CARDIOLOGY | Facility: HOSPITAL | Age: 63
End: 2023-10-28
Payer: MEDICAID

## 2023-10-28 LAB
ALBUMIN SERPL BCP-MCNC: 3.4 G/DL (ref 3.4–5)
ALBUMIN SERPL BCP-MCNC: 3.7 G/DL (ref 3.4–5)
ALP SERPL-CCNC: 70 U/L (ref 33–136)
ALP SERPL-CCNC: 75 U/L (ref 33–136)
ALT SERPL W P-5'-P-CCNC: 15 U/L (ref 7–45)
ALT SERPL W P-5'-P-CCNC: 17 U/L (ref 7–45)
ANION GAP SERPL CALC-SCNC: 17 MMOL/L (ref 10–20)
ANION GAP SERPL CALC-SCNC: 18 MMOL/L (ref 10–20)
APPEARANCE UR: CLEAR
AST SERPL W P-5'-P-CCNC: 11 U/L (ref 9–39)
AST SERPL W P-5'-P-CCNC: 12 U/L (ref 9–39)
BILIRUB SERPL-MCNC: 0.4 MG/DL (ref 0–1.2)
BILIRUB SERPL-MCNC: 0.5 MG/DL (ref 0–1.2)
BILIRUB UR STRIP.AUTO-MCNC: NEGATIVE MG/DL
BUN SERPL-MCNC: 54 MG/DL (ref 6–23)
BUN SERPL-MCNC: 63 MG/DL (ref 6–23)
CALCIUM SERPL-MCNC: 8.5 MG/DL (ref 8.6–10.3)
CALCIUM SERPL-MCNC: 9.4 MG/DL (ref 8.6–10.3)
CHLORIDE SERPL-SCNC: 103 MMOL/L (ref 98–107)
CHLORIDE SERPL-SCNC: 105 MMOL/L (ref 98–107)
CO2 SERPL-SCNC: 23 MMOL/L (ref 21–32)
CO2 SERPL-SCNC: 23 MMOL/L (ref 21–32)
COLOR UR: ABNORMAL
CREAT SERPL-MCNC: 2.65 MG/DL (ref 0.5–1.05)
CREAT SERPL-MCNC: 2.99 MG/DL (ref 0.5–1.05)
ERYTHROCYTE [DISTWIDTH] IN BLOOD BY AUTOMATED COUNT: 15.1 % (ref 11.5–14.5)
GFR SERPL CREATININE-BSD FRML MDRD: 17 ML/MIN/1.73M*2
GFR SERPL CREATININE-BSD FRML MDRD: 20 ML/MIN/1.73M*2
GLUCOSE SERPL-MCNC: 127 MG/DL (ref 74–99)
GLUCOSE SERPL-MCNC: 94 MG/DL (ref 74–99)
GLUCOSE UR STRIP.AUTO-MCNC: ABNORMAL MG/DL
HCT VFR BLD AUTO: 34.7 % (ref 36–46)
HGB BLD-MCNC: 10.9 G/DL (ref 12–16)
HOLD SPECIMEN: NORMAL
KETONES UR STRIP.AUTO-MCNC: NEGATIVE MG/DL
LACTATE SERPL-SCNC: 2.1 MMOL/L (ref 0.4–2)
LACTATE SERPL-SCNC: 2.2 MMOL/L (ref 0.4–2)
LEUKOCYTE ESTERASE UR QL STRIP.AUTO: NEGATIVE
MCH RBC QN AUTO: 29.7 PG (ref 26–34)
MCHC RBC AUTO-ENTMCNC: 31.4 G/DL (ref 32–36)
MCV RBC AUTO: 95 FL (ref 80–100)
NITRITE UR QL STRIP.AUTO: NEGATIVE
NRBC BLD-RTO: 0 /100 WBCS (ref 0–0)
PH UR STRIP.AUTO: 6 [PH]
PLATELET # BLD AUTO: 266 X10*3/UL (ref 150–450)
PMV BLD AUTO: 10 FL (ref 7.5–11.5)
POTASSIUM SERPL-SCNC: 3.7 MMOL/L (ref 3.5–5.3)
POTASSIUM SERPL-SCNC: 3.8 MMOL/L (ref 3.5–5.3)
PROT SERPL-MCNC: 5.5 G/DL (ref 6.4–8.2)
PROT SERPL-MCNC: 6 G/DL (ref 6.4–8.2)
PROT UR STRIP.AUTO-MCNC: ABNORMAL MG/DL
RBC # BLD AUTO: 3.67 X10*6/UL (ref 4–5.2)
RBC # UR STRIP.AUTO: NEGATIVE /UL
RBC #/AREA URNS AUTO: NORMAL /HPF
SODIUM SERPL-SCNC: 139 MMOL/L (ref 136–145)
SODIUM SERPL-SCNC: 142 MMOL/L (ref 136–145)
SP GR UR STRIP.AUTO: 1.01
SQUAMOUS #/AREA URNS AUTO: NORMAL /HPF
UROBILINOGEN UR STRIP.AUTO-MCNC: <2 MG/DL
VANCOMYCIN TROUGH SERPL-MCNC: 7 UG/ML (ref 5–20)
WBC # BLD AUTO: 15.9 X10*3/UL (ref 4.4–11.3)
WBC #/AREA URNS AUTO: NORMAL /HPF

## 2023-10-28 PROCEDURE — 93970 EXTREMITY STUDY: CPT

## 2023-10-28 PROCEDURE — 87081 CULTURE SCREEN ONLY: CPT | Mod: STJLAB | Performed by: INTERNAL MEDICINE

## 2023-10-28 PROCEDURE — 83605 ASSAY OF LACTIC ACID: CPT

## 2023-10-28 PROCEDURE — 93923 UPR/LXTR ART STDY 3+ LVLS: CPT

## 2023-10-28 PROCEDURE — 93923 UPR/LXTR ART STDY 3+ LVLS: CPT | Performed by: INTERNAL MEDICINE

## 2023-10-28 PROCEDURE — 99232 SBSQ HOSP IP/OBS MODERATE 35: CPT | Performed by: STUDENT IN AN ORGANIZED HEALTH CARE EDUCATION/TRAINING PROGRAM

## 2023-10-28 PROCEDURE — 80053 COMPREHEN METABOLIC PANEL: CPT | Performed by: EMERGENCY MEDICINE

## 2023-10-28 PROCEDURE — 36415 COLL VENOUS BLD VENIPUNCTURE: CPT | Performed by: INTERNAL MEDICINE

## 2023-10-28 PROCEDURE — 2500000004 HC RX 250 GENERAL PHARMACY W/ HCPCS (ALT 636 FOR OP/ED): Performed by: STUDENT IN AN ORGANIZED HEALTH CARE EDUCATION/TRAINING PROGRAM

## 2023-10-28 PROCEDURE — 80202 ASSAY OF VANCOMYCIN: CPT | Performed by: INTERNAL MEDICINE

## 2023-10-28 PROCEDURE — 2500000004 HC RX 250 GENERAL PHARMACY W/ HCPCS (ALT 636 FOR OP/ED)

## 2023-10-28 PROCEDURE — 80053 COMPREHEN METABOLIC PANEL: CPT | Performed by: INTERNAL MEDICINE

## 2023-10-28 PROCEDURE — 81001 URINALYSIS AUTO W/SCOPE: CPT

## 2023-10-28 PROCEDURE — 99222 1ST HOSP IP/OBS MODERATE 55: CPT | Performed by: SURGERY

## 2023-10-28 PROCEDURE — 2500000001 HC RX 250 WO HCPCS SELF ADMINISTERED DRUGS (ALT 637 FOR MEDICARE OP): Performed by: STUDENT IN AN ORGANIZED HEALTH CARE EDUCATION/TRAINING PROGRAM

## 2023-10-28 PROCEDURE — 1100000001 HC PRIVATE ROOM DAILY

## 2023-10-28 PROCEDURE — 2500000001 HC RX 250 WO HCPCS SELF ADMINISTERED DRUGS (ALT 637 FOR MEDICARE OP): Performed by: INTERNAL MEDICINE

## 2023-10-28 PROCEDURE — 85027 COMPLETE CBC AUTOMATED: CPT | Performed by: INTERNAL MEDICINE

## 2023-10-28 PROCEDURE — 93970 EXTREMITY STUDY: CPT | Performed by: INTERNAL MEDICINE

## 2023-10-28 PROCEDURE — 36415 COLL VENOUS BLD VENIPUNCTURE: CPT

## 2023-10-28 PROCEDURE — 2500000004 HC RX 250 GENERAL PHARMACY W/ HCPCS (ALT 636 FOR OP/ED): Performed by: INTERNAL MEDICINE

## 2023-10-28 RX ORDER — METOCLOPRAMIDE HYDROCHLORIDE 5 MG/ML
10 INJECTION INTRAMUSCULAR; INTRAVENOUS EVERY 6 HOURS PRN
Status: DISCONTINUED | OUTPATIENT
Start: 2023-10-28 | End: 2023-10-30 | Stop reason: HOSPADM

## 2023-10-28 RX ORDER — ACETAMINOPHEN 325 MG/1
650 TABLET ORAL EVERY 4 HOURS PRN
Status: DISCONTINUED | OUTPATIENT
Start: 2023-10-28 | End: 2023-10-30 | Stop reason: HOSPADM

## 2023-10-28 RX ORDER — OXYCODONE HYDROCHLORIDE 10 MG/1
10 TABLET ORAL EVERY 6 HOURS PRN
Status: DISCONTINUED | OUTPATIENT
Start: 2023-10-28 | End: 2023-10-30 | Stop reason: HOSPADM

## 2023-10-28 RX ORDER — ONDANSETRON HYDROCHLORIDE 2 MG/ML
4 INJECTION, SOLUTION INTRAVENOUS EVERY 8 HOURS PRN
Status: DISCONTINUED | OUTPATIENT
Start: 2023-10-28 | End: 2023-10-30 | Stop reason: HOSPADM

## 2023-10-28 RX ORDER — POLYETHYLENE GLYCOL 3350 17 G/17G
17 POWDER, FOR SOLUTION ORAL DAILY PRN
Status: DISCONTINUED | OUTPATIENT
Start: 2023-10-28 | End: 2023-10-30 | Stop reason: HOSPADM

## 2023-10-28 RX ORDER — ACETAMINOPHEN 325 MG/1
650 TABLET ORAL EVERY 4 HOURS PRN
Status: DISCONTINUED | OUTPATIENT
Start: 2023-10-28 | End: 2023-10-28

## 2023-10-28 RX ORDER — ASPIRIN 81 MG/1
81 TABLET ORAL DAILY
Status: DISCONTINUED | OUTPATIENT
Start: 2023-10-28 | End: 2023-10-30 | Stop reason: HOSPADM

## 2023-10-28 RX ORDER — ASPIRIN 81 MG/1
1 TABLET ORAL DAILY
COMMUNITY

## 2023-10-28 RX ORDER — DEXTROSE MONOHYDRATE 100 MG/ML
0.3 INJECTION, SOLUTION INTRAVENOUS ONCE AS NEEDED
Status: DISCONTINUED | OUTPATIENT
Start: 2023-10-28 | End: 2023-10-30

## 2023-10-28 RX ORDER — INSULIN LISPRO 100 [IU]/ML
0-10 INJECTION, SOLUTION INTRAVENOUS; SUBCUTANEOUS
Status: DISCONTINUED | OUTPATIENT
Start: 2023-10-28 | End: 2023-10-30

## 2023-10-28 RX ORDER — METOCLOPRAMIDE 10 MG/1
10 TABLET ORAL EVERY 6 HOURS PRN
Status: DISCONTINUED | OUTPATIENT
Start: 2023-10-28 | End: 2023-10-30 | Stop reason: HOSPADM

## 2023-10-28 RX ORDER — VANCOMYCIN HYDROCHLORIDE 1 G/200ML
1000 INJECTION, SOLUTION INTRAVENOUS ONCE
Status: COMPLETED | OUTPATIENT
Start: 2023-10-28 | End: 2023-10-29

## 2023-10-28 RX ORDER — ACETAMINOPHEN 650 MG/1
650 SUPPOSITORY RECTAL EVERY 4 HOURS PRN
Status: DISCONTINUED | OUTPATIENT
Start: 2023-10-28 | End: 2023-10-30 | Stop reason: HOSPADM

## 2023-10-28 RX ORDER — DEXTROSE MONOHYDRATE 100 MG/ML
0.3 INJECTION, SOLUTION INTRAVENOUS ONCE AS NEEDED
Status: DISCONTINUED | OUTPATIENT
Start: 2023-10-28 | End: 2023-10-28

## 2023-10-28 RX ORDER — ACETAMINOPHEN 160 MG/5ML
650 SOLUTION ORAL EVERY 4 HOURS PRN
Status: DISCONTINUED | OUTPATIENT
Start: 2023-10-28 | End: 2023-10-30 | Stop reason: HOSPADM

## 2023-10-28 RX ORDER — INSULIN LISPRO 100 [IU]/ML
0-10 INJECTION, SOLUTION INTRAVENOUS; SUBCUTANEOUS
Status: DISCONTINUED | OUTPATIENT
Start: 2023-10-28 | End: 2023-10-28

## 2023-10-28 RX ORDER — PANTOPRAZOLE SODIUM 40 MG/10ML
40 INJECTION, POWDER, LYOPHILIZED, FOR SOLUTION INTRAVENOUS
Status: DISCONTINUED | OUTPATIENT
Start: 2023-10-28 | End: 2023-10-30 | Stop reason: HOSPADM

## 2023-10-28 RX ORDER — DEXTROSE 50 % IN WATER (D50W) INTRAVENOUS SYRINGE
25
Status: DISCONTINUED | OUTPATIENT
Start: 2023-10-28 | End: 2023-10-30

## 2023-10-28 RX ORDER — METOPROLOL TARTRATE 50 MG/1
50 TABLET ORAL 2 TIMES DAILY
Status: DISCONTINUED | OUTPATIENT
Start: 2023-10-28 | End: 2023-10-30 | Stop reason: HOSPADM

## 2023-10-28 RX ORDER — TALC
3 POWDER (GRAM) TOPICAL NIGHTLY PRN
Status: DISCONTINUED | OUTPATIENT
Start: 2023-10-28 | End: 2023-10-30 | Stop reason: HOSPADM

## 2023-10-28 RX ORDER — PANTOPRAZOLE SODIUM 40 MG/1
40 TABLET, DELAYED RELEASE ORAL
Status: DISCONTINUED | OUTPATIENT
Start: 2023-10-28 | End: 2023-10-30 | Stop reason: HOSPADM

## 2023-10-28 RX ORDER — SODIUM CHLORIDE 9 MG/ML
100 INJECTION, SOLUTION INTRAVENOUS CONTINUOUS
Status: ACTIVE | OUTPATIENT
Start: 2023-10-28 | End: 2023-10-28

## 2023-10-28 RX ORDER — ONDANSETRON 4 MG/1
4 TABLET, ORALLY DISINTEGRATING ORAL EVERY 8 HOURS PRN
Status: DISCONTINUED | OUTPATIENT
Start: 2023-10-28 | End: 2023-10-30 | Stop reason: HOSPADM

## 2023-10-28 RX ORDER — DEXTROSE 50 % IN WATER (D50W) INTRAVENOUS SYRINGE
25
Status: DISCONTINUED | OUTPATIENT
Start: 2023-10-28 | End: 2023-10-28

## 2023-10-28 RX ORDER — AMITRIPTYLINE HYDROCHLORIDE 50 MG/1
25 TABLET, FILM COATED ORAL NIGHTLY
Status: DISCONTINUED | OUTPATIENT
Start: 2023-10-28 | End: 2023-10-30 | Stop reason: HOSPADM

## 2023-10-28 RX ORDER — OXYCODONE HYDROCHLORIDE 5 MG/1
5 TABLET ORAL EVERY 6 HOURS PRN
Status: DISCONTINUED | OUTPATIENT
Start: 2023-10-28 | End: 2023-10-30 | Stop reason: HOSPADM

## 2023-10-28 RX ADMIN — SODIUM CHLORIDE, POTASSIUM CHLORIDE, SODIUM LACTATE AND CALCIUM CHLORIDE 1000 ML: 600; 310; 30; 20 INJECTION, SOLUTION INTRAVENOUS at 00:08

## 2023-10-28 RX ADMIN — PREDNISONE 25 MG: 5 TABLET ORAL at 14:44

## 2023-10-28 RX ADMIN — NIFEDIPINE 90 MG: 60 TABLET, FILM COATED, EXTENDED RELEASE ORAL at 04:20

## 2023-10-28 RX ADMIN — APIXABAN 2.5 MG: 2.5 TABLET, FILM COATED ORAL at 03:41

## 2023-10-28 RX ADMIN — AMITRIPTYLINE HYDROCHLORIDE 25 MG: 50 TABLET, FILM COATED ORAL at 03:41

## 2023-10-28 RX ADMIN — APIXABAN 2.5 MG: 2.5 TABLET, FILM COATED ORAL at 20:18

## 2023-10-28 RX ADMIN — ONDANSETRON 4 MG: 2 INJECTION INTRAMUSCULAR; INTRAVENOUS at 03:28

## 2023-10-28 RX ADMIN — METOPROLOL TARTRATE 50 MG: 50 TABLET, FILM COATED ORAL at 03:41

## 2023-10-28 RX ADMIN — METOPROLOL TARTRATE 50 MG: 50 TABLET, FILM COATED ORAL at 20:22

## 2023-10-28 RX ADMIN — OXYCODONE HYDROCHLORIDE 5 MG: 5 TABLET ORAL at 20:22

## 2023-10-28 RX ADMIN — OXYCODONE HYDROCHLORIDE 5 MG: 5 TABLET ORAL at 03:28

## 2023-10-28 RX ADMIN — ASPIRIN 81 MG: 81 TABLET, COATED ORAL at 14:44

## 2023-10-28 RX ADMIN — PIPERACILLIN SODIUM AND TAZOBACTAM SODIUM 3.38 G: 3; .375 INJECTION, SOLUTION INTRAVENOUS at 11:34

## 2023-10-28 RX ADMIN — NIFEDIPINE 90 MG: 60 TABLET, FILM COATED, EXTENDED RELEASE ORAL at 20:22

## 2023-10-28 RX ADMIN — AMITRIPTYLINE HYDROCHLORIDE 25 MG: 50 TABLET, FILM COATED ORAL at 20:18

## 2023-10-28 RX ADMIN — SODIUM CHLORIDE 100 ML/HR: 9 INJECTION, SOLUTION INTRAVENOUS at 04:20

## 2023-10-28 RX ADMIN — PANTOPRAZOLE SODIUM 40 MG: 40 TABLET, DELAYED RELEASE ORAL at 07:19

## 2023-10-28 SDOH — SOCIAL STABILITY: SOCIAL NETWORK
DO YOU BELONG TO ANY CLUBS OR ORGANIZATIONS SUCH AS CHURCH GROUPS UNIONS, FRATERNAL OR ATHLETIC GROUPS, OR SCHOOL GROUPS?: NO

## 2023-10-28 SDOH — SOCIAL STABILITY: SOCIAL INSECURITY
WITHIN THE LAST YEAR, HAVE YOU BEEN KICKED, HIT, SLAPPED, OR OTHERWISE PHYSICALLY HURT BY YOUR PARTNER OR EX-PARTNER?: NO

## 2023-10-28 SDOH — SOCIAL STABILITY: SOCIAL NETWORK: HOW OFTEN DO YOU GET TOGETHER WITH FRIENDS OR RELATIVES?: MORE THAN THREE TIMES A WEEK

## 2023-10-28 SDOH — SOCIAL STABILITY: SOCIAL INSECURITY
WITHIN THE LAST YEAR, HAVE TO BEEN RAPED OR FORCED TO HAVE ANY KIND OF SEXUAL ACTIVITY BY YOUR PARTNER OR EX-PARTNER?: NO

## 2023-10-28 SDOH — SOCIAL STABILITY: SOCIAL INSECURITY: WITHIN THE LAST YEAR, HAVE YOU BEEN AFRAID OF YOUR PARTNER OR EX-PARTNER?: NO

## 2023-10-28 SDOH — ECONOMIC STABILITY: FOOD INSECURITY: WITHIN THE PAST 12 MONTHS, YOU WORRIED THAT YOUR FOOD WOULD RUN OUT BEFORE YOU GOT MONEY TO BUY MORE.: NEVER TRUE

## 2023-10-28 SDOH — HEALTH STABILITY: PHYSICAL HEALTH: ON AVERAGE, HOW MANY DAYS PER WEEK DO YOU ENGAGE IN MODERATE TO STRENUOUS EXERCISE (LIKE A BRISK WALK)?: 0 DAYS

## 2023-10-28 SDOH — SOCIAL STABILITY: SOCIAL INSECURITY: ARE YOU OR HAVE YOU BEEN THREATENED OR ABUSED PHYSICALLY, EMOTIONALLY, OR SEXUALLY BY ANYONE?: NO

## 2023-10-28 SDOH — SOCIAL STABILITY: SOCIAL INSECURITY: DO YOU FEEL UNSAFE GOING BACK TO THE PLACE WHERE YOU ARE LIVING?: NO

## 2023-10-28 SDOH — SOCIAL STABILITY: SOCIAL NETWORK: HOW OFTEN DO YOU ATTENT MEETINGS OF THE CLUB OR ORGANIZATION YOU BELONG TO?: NEVER

## 2023-10-28 SDOH — HEALTH STABILITY: MENTAL HEALTH
STRESS IS WHEN SOMEONE FEELS TENSE, NERVOUS, ANXIOUS, OR CAN'T SLEEP AT NIGHT BECAUSE THEIR MIND IS TROUBLED. HOW STRESSED ARE YOU?: NOT AT ALL

## 2023-10-28 SDOH — ECONOMIC STABILITY: FOOD INSECURITY: WITHIN THE PAST 12 MONTHS, THE FOOD YOU BOUGHT JUST DIDN'T LAST AND YOU DIDN'T HAVE MONEY TO GET MORE.: NEVER TRUE

## 2023-10-28 SDOH — HEALTH STABILITY: PHYSICAL HEALTH: ON AVERAGE, HOW MANY MINUTES DO YOU ENGAGE IN EXERCISE AT THIS LEVEL?: 0 MIN

## 2023-10-28 SDOH — SOCIAL STABILITY: SOCIAL NETWORK: ARE YOU MARRIED, WIDOWED, DIVORCED, SEPARATED, NEVER MARRIED, OR LIVING WITH A PARTNER?: MARRIED

## 2023-10-28 SDOH — SOCIAL STABILITY: SOCIAL NETWORK: HOW OFTEN DO YOU ATTEND CHURCH OR RELIGIOUS SERVICES?: 1 TO 4 TIMES PER YEAR

## 2023-10-28 SDOH — SOCIAL STABILITY: SOCIAL INSECURITY: ARE THERE ANY APPARENT SIGNS OF INJURIES/BEHAVIORS THAT COULD BE RELATED TO ABUSE/NEGLECT?: NO

## 2023-10-28 SDOH — SOCIAL STABILITY: SOCIAL INSECURITY: WITHIN THE LAST YEAR, HAVE YOU BEEN HUMILIATED OR EMOTIONALLY ABUSED IN OTHER WAYS BY YOUR PARTNER OR EX-PARTNER?: NO

## 2023-10-28 SDOH — SOCIAL STABILITY: SOCIAL INSECURITY: DOES ANYONE TRY TO KEEP YOU FROM HAVING/CONTACTING OTHER FRIENDS OR DOING THINGS OUTSIDE YOUR HOME?: NO

## 2023-10-28 SDOH — SOCIAL STABILITY: SOCIAL INSECURITY: WERE YOU ABLE TO COMPLETE ALL THE BEHAVIORAL HEALTH SCREENINGS?: YES

## 2023-10-28 SDOH — SOCIAL STABILITY: SOCIAL NETWORK
IN A TYPICAL WEEK, HOW MANY TIMES DO YOU TALK ON THE PHONE WITH FAMILY, FRIENDS, OR NEIGHBORS?: MORE THAN THREE TIMES A WEEK

## 2023-10-28 SDOH — SOCIAL STABILITY: SOCIAL INSECURITY: HAS ANYONE EVER THREATENED TO HURT YOUR FAMILY OR YOUR PETS?: NO

## 2023-10-28 SDOH — ECONOMIC STABILITY: INCOME INSECURITY: IN THE PAST 12 MONTHS, HAS THE ELECTRIC, GAS, OIL, OR WATER COMPANY THREATENED TO SHUT OFF SERVICE IN YOUR HOME?: NO

## 2023-10-28 SDOH — SOCIAL STABILITY: SOCIAL INSECURITY: ABUSE: ADULT

## 2023-10-28 SDOH — SOCIAL STABILITY: SOCIAL INSECURITY: HAVE YOU HAD THOUGHTS OF HARMING ANYONE ELSE?: NO

## 2023-10-28 SDOH — SOCIAL STABILITY: SOCIAL INSECURITY: DO YOU FEEL ANYONE HAS EXPLOITED OR TAKEN ADVANTAGE OF YOU FINANCIALLY OR OF YOUR PERSONAL PROPERTY?: NO

## 2023-10-28 ASSESSMENT — PAIN SCALES - GENERAL
PAINLEVEL_OUTOF10: 6
PAINLEVEL_OUTOF10: 0 - NO PAIN
PAINLEVEL_OUTOF10: 6
PAINLEVEL_OUTOF10: 4
PAINLEVEL_OUTOF10: 0 - NO PAIN

## 2023-10-28 ASSESSMENT — ENCOUNTER SYMPTOMS
COUGH: 0
VOMITING: 0
HEADACHES: 0
MYALGIAS: 0
GASTROINTESTINAL NEGATIVE: 1
ACTIVITY CHANGE: 0
DIZZINESS: 0
SHORTNESS OF BREATH: 0
ENDOCRINE NEGATIVE: 1
PSYCHIATRIC NEGATIVE: 1
ARTHRALGIAS: 0
LIGHT-HEADEDNESS: 0
WOUND: 0
NUMBNESS: 0
HEMATURIA: 0
DIARRHEA: 0
CONSTITUTIONAL NEGATIVE: 1
FEVER: 0
EYES NEGATIVE: 1
BACK PAIN: 0
SPEECH DIFFICULTY: 0
NAUSEA: 0
WEAKNESS: 0
ABDOMINAL PAIN: 0
DIFFICULTY URINATING: 0
BLOOD IN STOOL: 0
APNEA: 0
BRUISES/BLEEDS EASILY: 0

## 2023-10-28 ASSESSMENT — COGNITIVE AND FUNCTIONAL STATUS - GENERAL
DRESSING REGULAR LOWER BODY CLOTHING: A LITTLE
DAILY ACTIVITIY SCORE: 24
EATING MEALS: A LITTLE
DRESSING REGULAR UPPER BODY CLOTHING: A LITTLE
WALKING IN HOSPITAL ROOM: A LITTLE
MOBILITY SCORE: 18
MOVING TO AND FROM BED TO CHAIR: A LITTLE
WALKING IN HOSPITAL ROOM: A LITTLE
CLIMB 3 TO 5 STEPS WITH RAILING: A LITTLE
TOILETING: A LITTLE
PERSONAL GROOMING: A LITTLE
MOVING FROM LYING ON BACK TO SITTING ON SIDE OF FLAT BED WITH BEDRAILS: A LITTLE
HELP NEEDED FOR BATHING: A LITTLE
PATIENT BASELINE BEDBOUND: NO
TURNING FROM BACK TO SIDE WHILE IN FLAT BAD: A LITTLE
MOBILITY SCORE: 23
STANDING UP FROM CHAIR USING ARMS: A LITTLE
DAILY ACTIVITIY SCORE: 18

## 2023-10-28 ASSESSMENT — PAIN - FUNCTIONAL ASSESSMENT
PAIN_FUNCTIONAL_ASSESSMENT: 0-10

## 2023-10-28 ASSESSMENT — LIFESTYLE VARIABLES
HOW OFTEN DURING THE LAST YEAR HAVE YOU FOUND THAT YOU WERE NOT ABLE TO STOP DRINKING ONCE YOU HAD STARTED: NEVER
HOW OFTEN DURING THE LAST YEAR HAVE YOU NEEDED AN ALCOHOLIC DRINK FIRST THING IN THE MORNING TO GET YOURSELF GOING AFTER A NIGHT OF HEAVY DRINKING: NEVER
AUDIT TOTAL SCORE: 0
HOW OFTEN DO YOU HAVE 6 OR MORE DRINKS ON ONE OCCASION: NEVER
AUDIT-C TOTAL SCORE: 3
SUBSTANCE_ABUSE_PAST_12_MONTHS: YES
SKIP TO QUESTIONS 9-10: 1
PRESCIPTION_ABUSE_PAST_12_MONTHS: NO
HOW OFTEN DURING THE LAST YEAR HAVE YOU BEEN UNABLE TO REMEMBER WHAT HAPPENED THE NIGHT BEFORE BECAUSE YOU HAD BEEN DRINKING: NEVER
HOW OFTEN DURING THE LAST YEAR HAVE YOU HAD A FEELING OF GUILT OR REMORSE AFTER DRINKING: NEVER
HOW OFTEN DO YOU HAVE A DRINK CONTAINING ALCOHOL: 2-3 TIMES A WEEK
HOW OFTEN DURING THE LAST YEAR HAVE YOU FAILED TO DO WHAT WAS NORMALLY EXPECTED FROM YOU BECAUSE OF DRINKING: NEVER
AUDIT-C TOTAL SCORE: 3
HAS A RELATIVE, FRIEND, DOCTOR, OR ANOTHER HEALTH PROFESSIONAL EXPRESSED CONCERN ABOUT YOUR DRINKING OR SUGGESTED YOU CUT DOWN: NO
AUDIT TOTAL SCORE: 3
HAVE YOU OR SOMEONE ELSE BEEN INJURED AS A RESULT OF YOUR DRINKING: NO
HOW MANY STANDARD DRINKS CONTAINING ALCOHOL DO YOU HAVE ON A TYPICAL DAY: 1 OR 2

## 2023-10-28 ASSESSMENT — ACTIVITIES OF DAILY LIVING (ADL)
WALKS IN HOME: NEEDS ASSISTANCE
HEARING - LEFT EAR: FUNCTIONAL
JUDGMENT_ADEQUATE_SAFELY_COMPLETE_DAILY_ACTIVITIES: YES
FEEDING YOURSELF: INDEPENDENT
GROOMING: INDEPENDENT
HEARING - RIGHT EAR: FUNCTIONAL
TOILETING: INDEPENDENT
ADEQUATE_TO_COMPLETE_ADL: YES
PATIENT'S MEMORY ADEQUATE TO SAFELY COMPLETE DAILY ACTIVITIES?: YES
LACK_OF_TRANSPORTATION: NO
ASSISTIVE_DEVICE: WHEELCHAIR
BATHING: NEEDS ASSISTANCE
DRESSING YOURSELF: INDEPENDENT

## 2023-10-28 ASSESSMENT — PATIENT HEALTH QUESTIONNAIRE - PHQ9
1. LITTLE INTEREST OR PLEASURE IN DOING THINGS: NOT AT ALL
SUM OF ALL RESPONSES TO PHQ9 QUESTIONS 1 & 2: 0
2. FEELING DOWN, DEPRESSED OR HOPELESS: NOT AT ALL

## 2023-10-28 ASSESSMENT — PAIN DESCRIPTION - DESCRIPTORS
DESCRIPTORS: HEAVINESS
DESCRIPTORS: HEAVINESS

## 2023-10-28 NOTE — PROGRESS NOTES
"Estephania Hernandez is a 63 y.o. female on day 0 of admission presenting with Cellulitis of leg, right.    Subjective   Patient seen and examined, no overnight event. Still have pain in the RLE. Exam does not review any firmness in the RLE, warm to touch, edematous, likely pain from cellulitis. Patient has no other complains at this time.        Objective     Physical Exam  Constitutional:       Appearance: Normal appearance.   HENT:      Head: Normocephalic and atraumatic.   Eyes:      Pupils: Pupils are equal, round, and reactive to light.   Cardiovascular:      Rate and Rhythm: Normal rate and regular rhythm.   Pulmonary:      Effort: Pulmonary effort is normal.      Breath sounds: Normal breath sounds.   Abdominal:      General: Abdomen is flat. Bowel sounds are normal.      Palpations: Abdomen is soft.   Musculoskeletal:         General: Swelling and tenderness present. Normal range of motion.      Cervical back: Neck supple.      Right lower leg: Edema present.      Comments: Wound vac on the medial R-lower leg   Skin:     General: Skin is warm.      Findings: Erythema (in the RLE) present.   Neurological:      General: No focal deficit present.      Mental Status: She is alert and oriented to person, place, and time.   Psychiatric:         Mood and Affect: Mood normal.         Behavior: Behavior normal.         Last Recorded Vitals  Blood pressure 134/83, pulse 101, temperature 36.8 °C (98.2 °F), resp. rate 18, height 1.575 m (5' 2\"), weight 74.8 kg (165 lb), SpO2 96 %.  Intake/Output last 3 Shifts:  I/O last 3 completed shifts:  In: 1050 (14 mL/kg) [IV Piggyback:1050]  Out: - (0 mL/kg)   Weight: 74.8 kg     Relevant Results  Scheduled medications  amitriptyline, 25 mg, oral, Nightly  apixaban, 2.5 mg, oral, BID  metoprolol tartrate, 50 mg, oral, BID  NIFEdipine ER, 90 mg, oral, BID  pantoprazole, 40 mg, oral, Daily before breakfast   Or  pantoprazole, 40 mg, intravenous, Daily before " breakfast  piperacillin-tazobactam, 3.375 g, intravenous, q12h  pneumococcal conjugate, 0.5 mL, intramuscular, During hospitalization      Continuous medications  sodium chloride 0.9%, 100 mL/hr, Last Rate: 100 mL/hr (10/28/23 0720)      PRN medications  PRN medications: acetaminophen **OR** acetaminophen **OR** acetaminophen, acetaminophen **OR** acetaminophen **OR** acetaminophen, melatonin, metoclopramide **OR** metoclopramide, ondansetron ODT **OR** ondansetron, oxyCODONE, oxyCODONE, polyethylene glycol  Results from last 7 days   Lab Units 10/28/23  0629 10/27/23  1737   WBC AUTO x10*3/uL 15.9* 18.8*   RBC AUTO x10*6/uL 3.67* 4.76   HEMOGLOBIN g/dL 10.9* 14.1     Results from last 7 days   Lab Units 10/28/23  0629 10/28/23  0004   SODIUM mmol/L 139 142   POTASSIUM mmol/L 3.7 3.8   CHLORIDE mmol/L 103 105   CO2 mmol/L 23 23   BUN mg/dL 54* 63*   CREATININE mg/dL 2.65* 2.99*   CALCIUM mg/dL 8.5* 9.4   BILIRUBIN TOTAL mg/dL 0.5 0.4   ALT U/L 15 17   AST U/L 12 11         Assessment/Plan   Principal Problem:    Cellulitis of leg, right  Active Problems:    Hypertension, essential, benign    HFrEF (heart failure with reduced ejection fraction) (CMS/[facility])    PMR (polymyalgia rheumatica) (CMS/[facility])    Non-healing wound of lower extremity    DVT (deep venous thrombosis) (CMS/[facility])    Type 2 diabetes mellitus with diabetic dermatitis, with long-term current use of insulin (CMS/[facility])    Cellulitis of right lower extremity    Plan  -continue vanc/zosyn per ID recommendation  -continue follow up with outpatient ID and hyperbaric oxygen  -may consider CT/MRI imaging of lower extremities if pain is persistent to rule out underlying bony abnormalities (X-ray did not show any acute findings)  -continue Eliquis (reduced dose) due to recurrent dvt, patient follows with Dr. Valles, please see note on 10/2/2023  -SSI coverage  -continue home dose prednisone for PMR  -dvt prophylaxis with eliquis         I spent 35 minutes in the  professional and overall care of this patient.      Syed Ziegler MD

## 2023-10-28 NOTE — NURSING NOTE
Pt arrived from ED to [id number] with  at 0130.  Pt has own wound vac with her.  Pt slept overnight.  No acute events overnight.  Continue to monitor.  Call light within reach.  Bed alarm on.  Bed in low position.

## 2023-10-28 NOTE — H&P
History Of Present Illness  Estephania Hernandez is a 63 y.o. female presenting with concern for worsening right lower extremity pain and cellulitis with a well-documented history of right lower extremity wounds that have been refractory to therapy.  Patient is noted to have chronic osteomyelitis of the right foot.  He was at the recommendation of her wound care team that she sought evaluation in the emergency department for symptoms which she states included increased pressure and tightness bilaterally but right greater than sign left, intermittent paresthesias, as well as intermittent sharp, stabbing sensations.  Patient endorses worsening symptoms with exertion.  She states her greatest concern was that the symptoms represented new blood clots as she had previously been diagnosed with lower extremity DVT and then subsequently with subsegmental PE.  She is compliant with her 2.5 mg of Eliquis twice daily.  Upon arrival to this facility, patient noted to have an elevated WBC of 18.8.  She was tachycardic and tachypneic.  Right lower extremity cellulitis was confirmed.  Patient admitted for further evaluation.     Past Medical History  Past Medical History:   Diagnosis Date    Essential (primary) hypertension 12/30/2022    Hypertension, essential, benign       Surgical History  Past Surgical History:   Procedure Laterality Date    OTHER SURGICAL HISTORY  10/21/2022    Mastectomy    OTHER SURGICAL HISTORY  10/21/2022    Lower leg fracture repair    OTHER SURGICAL HISTORY  10/21/2022    Ankle fracture repair    OTHER SURGICAL HISTORY  10/21/2022    Lower leg fracture repair    OTHER SURGICAL HISTORY  10/21/2022    Colonoscopy    OTHER SURGICAL HISTORY  10/21/2022    Temporal artery biopsy    OTHER SURGICAL HISTORY  10/21/2022    Mastectomy bilateral    OTHER SURGICAL HISTORY  10/21/2022    Appendectomy        Social History  She reports that she quit smoking about 41 years ago. Her smoking use included cigarettes. She has  never used smokeless tobacco. She reports current alcohol use of about 3.0 standard drinks of alcohol per week. She reports that she does not use drugs.    Family History  Family History   Problem Relation Name Age of Onset    Other (Arteriossclerrosis of nonautologous coronary artery bypass graft without angina pectoris) Sibling          Allergies  House dust mite, Mold, Cat dander, Dog dander, Sutures, Egg, and Corn    Review of Systems   All other systems reviewed and are negative.       Physical Exam  Vitals reviewed.   Constitutional:       Appearance: Normal appearance.   HENT:      Head: Normocephalic and atraumatic.      Nose: Nose normal.      Mouth/Throat:      Mouth: Mucous membranes are moist.   Eyes:      Extraocular Movements: Extraocular movements intact.      Conjunctiva/sclera: Conjunctivae normal.      Pupils: Pupils are equal, round, and reactive to light.   Cardiovascular:      Rate and Rhythm: Normal rate and regular rhythm.      Pulses: Normal pulses.      Heart sounds: Normal heart sounds.   Pulmonary:      Effort: Pulmonary effort is normal.      Breath sounds: Normal breath sounds.   Abdominal:      General: Bowel sounds are normal.      Palpations: Abdomen is soft.   Musculoskeletal:         General: Normal range of motion.      Cervical back: Normal range of motion and neck supple.   Skin:     General: Skin is warm and dry.      Findings: Erythema present.   Neurological:      General: No focal deficit present.      Mental Status: She is alert. Mental status is at baseline.   Psychiatric:         Mood and Affect: Mood normal.         Behavior: Behavior normal.          Last Recorded Vitals  Blood pressure 136/78, pulse 101, temperature 36.4 °C (97.5 °F), temperature source Temporal, resp. rate 18, weight 68 kg (150 lb), SpO2 98 %.    Relevant Results  Scheduled medications  amitriptyline, 25 mg, oral, Nightly  apixaban, 2.5 mg, oral, BID  metoprolol tartrate, 50 mg, oral, BID  NIFEdipine  ER, 90 mg, oral, BID  pantoprazole, 40 mg, oral, Daily before breakfast   Or  pantoprazole, 40 mg, intravenous, Daily before breakfast  piperacillin-tazobactam, 3.375 g, intravenous, q12h  pneumococcal conjugate, 0.5 mL, intramuscular, During hospitalization      Continuous medications  sodium chloride 0.9%, 100 mL/hr, Last Rate: 100 mL/hr (10/28/23 0720)      PRN medications  PRN medications: acetaminophen **OR** acetaminophen **OR** acetaminophen, acetaminophen **OR** acetaminophen **OR** acetaminophen, melatonin, metoclopramide **OR** metoclopramide, ondansetron ODT **OR** ondansetron, oxyCODONE, oxyCODONE, polyethylene glycol  XR foot right 1-2 views    Result Date: 10/27/2023  STUDY: Foot Radiographs; 10/27/2023 8:35 PM INDICATION: Right foot infection.  Evaluate for evidence of osteomyelitis. COMPARISON: None available. ACCESSION NUMBER(S): [id number] ORDERING CLINICIAN: ARCENIO BRASWELL TECHNIQUE:  Two view(s) of the right foot. FINDINGS:  There is no displaced fracture.  The alignment is anatomic.  Wound VAC seen medial ankle.  Soft tissue swelling dorsum of the forefoot.     No fracture. Signed by Jose Pena MD    XR tibia fibula right 2 views    Result Date: 10/27/2023  STUDY: Tibia and Fibula Radiographs; 10/27/2023 at 8:15 PM. INDICATION: Right foot wound. COMPARISON: XR right tibia/fibula 2/13/2022. ACCESSION NUMBER(S): [id number] ORDERING CLINICIAN: ARCENIO BRASWELL TECHNIQUE:  Two view(s) of the right tibia and fibula (four images). FINDINGS:  Intramedullary benjie with lower leg soft tissue swelling.  Distal fibular reconstruction plate and screw.  There is no displaced fracture.  The alignment is anatomic.      No fracture. Signed by Jose Pena MD   Results for orders placed or performed during the hospital encounter of 10/27/23 (from the past 24 hour(s))   CBC   Result Value Ref Range    WBC 18.8 (H) 4.4 - 11.3 x10*3/uL    nRBC 0.0 0.0 - 0.0 /100 WBCs    RBC 4.76 4.00 - 5.20 x10*6/uL     Hemoglobin 14.1 12.0 - 16.0 g/dL    Hematocrit 43.8 36.0 - 46.0 %    MCV 92 80 - 100 fL    MCH 29.6 26.0 - 34.0 pg    MCHC 32.2 32.0 - 36.0 g/dL    RDW 14.7 (H) 11.5 - 14.5 %    Platelets 361 150 - 450 x10*3/uL    MPV 9.9 7.5 - 11.5 fL   Comprehensive metabolic panel   Result Value Ref Range    Glucose 127 (H) 74 - 99 mg/dL    Sodium 142 136 - 145 mmol/L    Potassium 3.8 3.5 - 5.3 mmol/L    Chloride 105 98 - 107 mmol/L    Bicarbonate 23 21 - 32 mmol/L    Anion Gap 18 10 - 20 mmol/L    Urea Nitrogen 63 (H) 6 - 23 mg/dL    Creatinine 2.99 (H) 0.50 - 1.05 mg/dL    eGFR 17 (L) >60 mL/min/1.73m*2    Calcium 9.4 8.6 - 10.3 mg/dL    Albumin 3.7 3.4 - 5.0 g/dL    Alkaline Phosphatase 75 33 - 136 U/L    Total Protein 6.0 (L) 6.4 - 8.2 g/dL    AST 11 9 - 39 U/L    Bilirubin, Total 0.4 0.0 - 1.2 mg/dL    ALT 17 7 - 45 U/L   Lactate   Result Value Ref Range    Lactate 2.2 (H) 0.4 - 2.0 mmol/L   Lactate   Result Value Ref Range    Lactate 2.1 (H) 0.4 - 2.0 mmol/L   CBC   Result Value Ref Range    WBC 15.9 (H) 4.4 - 11.3 x10*3/uL    nRBC 0.0 0.0 - 0.0 /100 WBCs    RBC 3.67 (L) 4.00 - 5.20 x10*6/uL    Hemoglobin 10.9 (L) 12.0 - 16.0 g/dL    Hematocrit 34.7 (L) 36.0 - 46.0 %    MCV 95 80 - 100 fL    MCH 29.7 26.0 - 34.0 pg    MCHC 31.4 (L) 32.0 - 36.0 g/dL    RDW 15.1 (H) 11.5 - 14.5 %    Platelets 266 150 - 450 x10*3/uL    MPV 10.0 7.5 - 11.5 fL   Comprehensive metabolic panel   Result Value Ref Range    Glucose 94 74 - 99 mg/dL    Sodium 139 136 - 145 mmol/L    Potassium 3.7 3.5 - 5.3 mmol/L    Chloride 103 98 - 107 mmol/L    Bicarbonate 23 21 - 32 mmol/L    Anion Gap 17 10 - 20 mmol/L    Urea Nitrogen 54 (H) 6 - 23 mg/dL    Creatinine 2.65 (H) 0.50 - 1.05 mg/dL    eGFR 20 (L) >60 mL/min/1.73m*2    Calcium 8.5 (L) 8.6 - 10.3 mg/dL    Albumin 3.4 3.4 - 5.0 g/dL    Alkaline Phosphatase 70 33 - 136 U/L    Total Protein 5.5 (L) 6.4 - 8.2 g/dL    AST 12 9 - 39 U/L    Bilirubin, Total 0.5 0.0 - 1.2 mg/dL    ALT 15 7 - 45 U/L        Assessment/Plan   Principal Problem:    Cellulitis of leg, right  Active Problems:    Hypertension, essential, benign    HFrEF (heart failure with reduced ejection fraction) (CMS/[facility])    Non-healing wound of lower extremity    Type 2 diabetes mellitus with diabetic dermatitis, with long-term current use of insulin (CMS/[facility])    Cellulitis of right lower extremity      Patient presents with persistent cellulitis and symptoms that she describes as increased lower extremity pressure, paresthesias, and pain that seem to be worse with walking.  Does participate in daily hyperbaric oxygen therapy.  She is on chronic doxycycline and does follow with outpatient infectious disease service.      Continue broad-spectrum antimicrobial coverage.  Consult placed to ID service, appreciate recommendations and management.  Blood and urine cultures to be obtained.  Defer any type of regional wound culture given the lack of sterility.  Would likely require intraoperative sampling if needing true wound culture bacteria profile.    Will order PVR/DENNIS studies to be obtained.  Patient has poor wound healing, risk factors for peripheral vascular disease including obesity, diabetes, tobacco use history. Defer consult with vascular pending studies. If abnormal, refer as OP.     Home medications have been reviewed and resumed as indicated    PT/OT       I spent >75 minutes in the professional and overall care of this patient.      Vargas Ramon, DO

## 2023-10-28 NOTE — CONSULTS
Infectious Disease Consult    PATIENT NAME: Estephania Hernandez    MRN: [id number]  SERVICE DATE:  10/28/2023   SERVICE TIME:  8:30 AM    SIGNATURE: Cyndie Busby MD    PRIMARY CARE PHYSICIAN: Ramon Long DO  REASON FOR CONSULT: Right lower extremity cellulitis  REQUESTING PHYSICIAN: Dr. Toney DAVIS  63-year-old female with a chronic nonhealing distal right lower extremity wound with wound VAC in place who presented with worsening right lower extremity swelling and redness, admitted for cellulitis, started on vancomycin and Zosyn, blood culture was sent.  X-ray showed no acute findings.  Labs showed leukocytosis of  15.9, creatinine of 2.65.  Lower extremity Doppler is pending.  ID team was consulted for antibiotics management.      PAST MEDICAL HISTORY:   Past Medical History:   Diagnosis Date    Essential (primary) hypertension 2022    Hypertension, essential, benign     PAST SURGICAL HISTORY:   Past Surgical History:   Procedure Laterality Date    OTHER SURGICAL HISTORY  10/21/2022    Mastectomy    OTHER SURGICAL HISTORY  10/21/2022    Lower leg fracture repair    OTHER SURGICAL HISTORY  10/21/2022    Ankle fracture repair    OTHER SURGICAL HISTORY  10/21/2022    Lower leg fracture repair    OTHER SURGICAL HISTORY  10/21/2022    Colonoscopy    OTHER SURGICAL HISTORY  10/21/2022    Temporal artery biopsy    OTHER SURGICAL HISTORY  10/21/2022    Mastectomy bilateral    OTHER SURGICAL HISTORY  10/21/2022    Appendectomy     FAMILY HISTORY:   Family History   Problem Relation Name Age of Onset    Other (Arteriossclerrosis of nonautologous coronary artery bypass graft without angina pectoris) Sibling       SOCIAL HISTORY:   Social History     Tobacco Use    Smoking status: Former     Types: Cigarettes     Quit date: 1982     Years since quittin.8    Smokeless tobacco: Never   Vaping Use    Vaping Use: Former   Substance Use Topics    Alcohol use: Yes     Alcohol/week: 3.0 standard drinks of alcohol      Types: 3 Shots of liquor per week    Drug use: Yes     Types: Marijuana     CURRENT ALLERGIES:   Allergies as of 10/27/2023 - Reviewed 10/27/2023   Allergen Reaction Noted    House dust mite Other, Itching, and Shortness of breath 03/19/2019    Mold Cough, Itching, and Shortness of breath 03/19/2019    Cat dander Other, Itching, and Swelling 03/19/2019    Dog dander Other, Itching, and Swelling 03/19/2019    Sutures Itching and Swelling 03/19/2019    Egg Other 06/08/2023    Corn Other 03/19/2019     MEDICATIONS:    Current Facility-Administered Medications:     acetaminophen (Tylenol) tablet 650 mg, 650 mg, oral, q4h PRN **OR** acetaminophen (Tylenol) oral liquid 650 mg, 650 mg, nasogastric tube, q4h PRN **OR** acetaminophen (Tylenol) suppository 650 mg, 650 mg, rectal, q4h PRN, Vargas Ramon DO    acetaminophen (Tylenol) tablet 650 mg, 650 mg, oral, q4h PRN **OR** acetaminophen (Tylenol) oral liquid 650 mg, 650 mg, oral, q4h PRN **OR** acetaminophen (Tylenol) suppository 650 mg, 650 mg, rectal, q4h PRN, Vargas Ramon DO    amitriptyline (Elavil) tablet 25 mg, 25 mg, oral, Nightly, Vargas Ramon DO, 25 mg at 10/28/23 0341    apixaban (Eliquis) tablet 2.5 mg, 2.5 mg, oral, BID, Vargas Ramon DO, 2.5 mg at 10/28/23 0341    melatonin tablet 3 mg, 3 mg, oral, Nightly PRN, Vargas Ramon DO    metoclopramide (Reglan) tablet 10 mg, 10 mg, oral, q6h PRN **OR** metoclopramide (Reglan) injection 10 mg, 10 mg, intravenous, q6h PRN, Vargas Ramon DO    metoprolol tartrate (Lopressor) tablet 50 mg, 50 mg, oral, BID, Vargas Ramon DO, 50 mg at 10/28/23 0341    NIFEdipine ER (Adalat CC) 24 hr tablet 90 mg, 90 mg, oral, BID, Vargas Ramon DO, 90 mg at 10/28/23 0420    ondansetron ODT (Zofran-ODT) disintegrating tablet 4 mg, 4 mg, oral, q8h PRN **OR** ondansetron (Zofran) injection 4 mg, 4 mg, intravenous, q8h PRN, Vargas Ramon DO, 4 mg at 10/28/23 0328    oxyCODONE (Roxicodone) immediate release  "tablet 10 mg, 10 mg, oral, q6h PRN, Vargas Ramon DO    oxyCODONE (Roxicodone) immediate release tablet 5 mg, 5 mg, oral, q6h PRN, Vargas Ramon DO, 5 mg at 10/28/23 0328    pantoprazole (ProtoNix) EC tablet 40 mg, 40 mg, oral, Daily before breakfast, 40 mg at 10/28/23 0719 **OR** pantoprazole (ProtoNix) injection 40 mg, 40 mg, intravenous, Daily before breakfast, Vargas Ramon DO    piperacillin-tazobactam-dextrose (Zosyn) IV 3.375 g, 3.375 g, intravenous, q12h, Vargas Ramon DO    pneumococcal conjugate vaccine, 13-valent (PREVNAR 13), 0.5 mL, intramuscular, During hospitalization, Vargas Ramon DO    polyethylene glycol (Glycolax, Miralax) packet 17 g, 17 g, oral, Daily PRN, Vargas Ramon DO    sodium chloride 0.9% infusion, 100 mL/hr, intravenous, Continuous, Vargas Ramon DO, Last Rate: 100 mL/hr at 10/28/23 0720, 100 mL/hr at 10/28/23 0720       COMPLETE REVIEW OF SYSTEMS:    Review of systems shows no findings other than what is described in the narrative above.  Specifically, the patient has had no significant changes in eyesight, no sore throat, no post nasal drip, no ear pains.  There has been no cough or chest pains, pleuritic or otherwise.  There has been no abdominal pain, no nausea or vomiting, nor diarrhea.   There has been no dysuria, urinary frequency, or flank pain.        PHYSICAL EXAM:  Patient Vitals for the past 24 hrs:   BP Temp Temp src Pulse Resp SpO2 Height Weight   10/28/23 0800 142/88 36.6 °C (97.9 °F) -- 84 18 94 % -- --   10/28/23 0305 -- -- -- -- -- -- 1.575 m (5' 2\") 74.8 kg (165 lb)   10/28/23 0212 (!) 177/93 36.1 °C (97 °F) Temporal 101 18 97 % -- --   10/28/23 0020 (!) 164/93 36.4 °C (97.5 °F) -- 101 18 98 % -- --   10/27/23 2249 154/84 36.2 °C (97.2 °F) -- 107 18 98 % -- --   10/27/23 1411 136/78 36.4 °C (97.5 °F) Temporal 101 18 98 % -- 68 kg (150 lb)     Body mass index is 30.18 kg/m².  Gen: NAD  Neck: symmetric, no mass  Cardiovascular: " "RRR  Respiratory: No distress   GI: Abd soft, nontender, non-distended  Extremities: Right lower extremity wound VAC in place, with edema and erythema extending just below the knee  Skin: Warm and dry.  Neuro: alert and oriented times 3  : no ladd     Labs:  Lab Results   Component Value Date    WBC 15.9 (H) 10/28/2023    HGB 10.9 (L) 10/28/2023    HCT 34.7 (L) 10/28/2023    MCV 95 10/28/2023     10/28/2023     Lab Results   Component Value Date    GLUCOSE 94 10/28/2023    CALCIUM 8.5 (L) 10/28/2023     10/28/2023    K 3.7 10/28/2023    CO2 23 10/28/2023     10/28/2023    BUN 54 (H) 10/28/2023    CREATININE 2.65 (H) 10/28/2023   ESR: --No results found for: \"SEDRATE\"  Lab Results   Component Value Date    CRP 0.44 08/16/2023     Lab Results   Component Value Date    ALT 15 10/28/2023    AST 12 10/28/2023    ALKPHOS 70 10/28/2023    BILITOT 0.5 10/28/2023       DATA:   Diagnostic tests reviewed for today's visit:    Labs this admission reviewed  Imagings this admission reviewed  Cultures: Reviewed        ASSESSMENT and PLAN:  63-year-old female with a chronic nonhealing right lower extremity wound with wound VAC in place, now admitted with right lower extremity cellulitis with leukocytosis.  Follow-up blood culture.  Check MRSA screen.  Continue vancomycin and closely monitor renal function and vancomycin level.  Continue Zosyn.  Closely monitor for antibiotic side effects including rash, diarrhea, thrombocytopenia, etc.    Will continue to follow     Thank you so much for this consultation     Cyndie Busby MD.   Infectious Disease Attending       "

## 2023-10-28 NOTE — NURSING NOTE
Was asked to see patient for VAC placement. Pt was on her [employer] VAC from home. Home VAC placed in bedside stand, Hospital VAC placed. Dressing was changed yesterday so just did a swap. Dressing will be changed Monday. Per patient, the setting was recently increased to 150 mHg.

## 2023-10-28 NOTE — PROGRESS NOTES
"Vancomycin Dosing by Pharmacy- INITIAL    Estephania Hernandez is a 63 y.o. year old female who Pharmacy has been consulted for vancomycin dosing for cellulitis, skin and soft tissue. Based on the patient's indication and renal status this patient will be dosed based on a goal trough/random level of 10-15.     Renal function is currently declining.    Visit Vitals  BP (!) 177/93 (BP Location: Right arm, Patient Position: Lying)   Pulse 101   Temp 36.1 °C (97 °F) (Temporal)   Resp 18        Lab Results   Component Value Date    CREATININE 2.99 (H) 10/28/2023    CREATININE 2.71 (H) 09/26/2023    CREATININE 2.66 (H) 08/16/2023    CREATININE 2.36 (H) 08/09/2023    CREATININE 2.36 (H) 08/02/2023        Patient weight is No results found for: \"PTWEIGHT\"    No results found for: \"CULTURE\"     No intake/output data recorded.  [unfilled]    Lab Results   Component Value Date    PATIENTTEMP 37.0 05/20/2023    PATIENTTEMP 37.0 05/20/2023    PATIENTTEMP 37.0 05/19/2023          Assessment/Plan     Patient has already been given a loading dose of 750 mg.  Follow-up level will be ordered on 10/28 at 2200 unless clinically indicated sooner.  Will continue to monitor renal function daily while on vancomycin and order serum creatinine at least every 48 hours if not already ordered.  Follow for continued vancomycin needs, clinical response, and signs/symptoms of toxicity.       Will Hurtado PharmD       "

## 2023-10-28 NOTE — CONSULTS
Inpatient consult to Vascular Surgery  Consult performed by: Radha Garcia MD  Consult ordered by: Syed Ziegler MD  Reason for consult: cellulitis          Reason For Consult  cellulitis    History Of Present Illness  Estephania Hernandez is a 63 y.o. female presenting with right leg cellulitis. She states she has had an open wound on the inner right calf for about one year since the removal of infected hardware that was originally placed to repair an ankle fracture. She has been followed in the wound center and does currently have a wound vac in place. However she states that over the past several days she has notice increasing swelling, redness, and pain in the right lower extremity. She was sent to the ED by the wound care center. She has no known h/o PAD. But given poor wound healing was recommended to be evaluated by vascular surgery.     Past Medical History  She has a past medical history of Essential (primary) hypertension (12/30/2022).    Surgical History  She has a past surgical history that includes Other surgical history (10/21/2022); Other surgical history (10/21/2022); Other surgical history (10/21/2022); Other surgical history (10/21/2022); Other surgical history (10/21/2022); Other surgical history (10/21/2022); Other surgical history (10/21/2022); and Other surgical history (10/21/2022).     Social History  She reports that she quit smoking about 41 years ago. Her smoking use included cigarettes. She has never used smokeless tobacco. She reports current alcohol use of about 3.0 standard drinks of alcohol per week. She reports current drug use. Drug: Marijuana.    Family History  Family History   Problem Relation Name Age of Onset    Other (Arteriossclerrosis of nonautologous coronary artery bypass graft without angina pectoris) Sibling          Allergies  House dust mite, Mold, Cat dander, Dog dander, Sutures, Egg, and Corn    Review of Systems   Constitutional: Negative.  Negative for activity change and  fever.   HENT: Negative.     Eyes: Negative.  Negative for visual disturbance.   Respiratory:  Negative for apnea, cough and shortness of breath.    Cardiovascular:  Positive for leg swelling. Negative for chest pain.   Gastrointestinal: Negative.  Negative for abdominal pain, blood in stool, diarrhea, nausea and vomiting.   Endocrine: Negative.    Genitourinary: Negative.  Negative for difficulty urinating and hematuria.   Musculoskeletal:  Negative for arthralgias, back pain, gait problem and myalgias.        Right leg pain   Skin:  Negative for pallor and wound.        Redness of right leg   Neurological:  Negative for dizziness, syncope, speech difficulty, weakness, light-headedness, numbness and headaches.   Hematological:  Does not bruise/bleed easily.   Psychiatric/Behavioral: Negative.          Physical Exam  Vitals reviewed.   Constitutional:       General: She is not in acute distress.     Appearance: Normal appearance. She is obese.   HENT:      Head: Normocephalic and atraumatic.   Eyes:      Extraocular Movements: Extraocular movements intact.      Conjunctiva/sclera: Conjunctivae normal.      Pupils: Pupils are equal, round, and reactive to light.   Neck:      Vascular: No carotid bruit.   Cardiovascular:      Rate and Rhythm: Normal rate and regular rhythm.      Pulses: Normal pulses.           Femoral pulses are 2+ on the right side and 2+ on the left side.       Popliteal pulses are 2+ on the right side and 2+ on the left side.        Dorsalis pedis pulses are 2+ on the right side and 2+ on the left side.        Posterior tibial pulses are 2+ on the left side.      Heart sounds: Normal heart sounds.      Comments: Difficult to palpate right PT pulse due to edema and overlying wound vac dressing  Pulmonary:      Effort: Pulmonary effort is normal.      Breath sounds: Normal breath sounds.   Abdominal:      General: Abdomen is flat.      Palpations: Abdomen is soft.   Musculoskeletal:         General:  "No swelling. Normal range of motion.      Cervical back: Normal range of motion. No tenderness.   Skin:     General: Skin is warm and dry.   Neurological:      General: No focal deficit present.      Mental Status: She is alert and oriented to person, place, and time.      Cranial Nerves: No cranial nerve deficit.      Sensory: No sensory deficit.      Motor: No weakness.   Psychiatric:         Mood and Affect: Mood normal.         Behavior: Behavior normal.          Last Recorded Vitals  Blood pressure 119/78, pulse (!) 115, temperature 36.6 °C (97.9 °F), resp. rate 18, height 1.575 m (5' 2\"), weight 74.8 kg (165 lb), SpO2 97 %.    Relevant Results    Current Facility-Administered Medications:     acetaminophen (Tylenol) tablet 650 mg, 650 mg, oral, q4h PRN **OR** acetaminophen (Tylenol) oral liquid 650 mg, 650 mg, nasogastric tube, q4h PRN **OR** acetaminophen (Tylenol) suppository 650 mg, 650 mg, rectal, q4h PRN, Vargas Ramon DO    acetaminophen (Tylenol) tablet 650 mg, 650 mg, oral, q4h PRN **OR** acetaminophen (Tylenol) oral liquid 650 mg, 650 mg, oral, q4h PRN **OR** acetaminophen (Tylenol) suppository 650 mg, 650 mg, rectal, q4h PRN, Vargas Ramon DO    amitriptyline (Elavil) tablet 25 mg, 25 mg, oral, Nightly, Vargas Ramon DO, 25 mg at 10/28/23 0341    apixaban (Eliquis) tablet 2.5 mg, 2.5 mg, oral, BID, Vargas Ramon DO, 2.5 mg at 10/28/23 0341    aspirin EC tablet 81 mg, 81 mg, oral, Daily, Syed Ziegler MD, 81 mg at 10/28/23 1444    dextrose 10 % in water (D10W) infusion, 0.3 g/kg/hr, intravenous, Once PRN, Syed Ziegler MD    dextrose 50 % injection 25 g, 25 g, intravenous, q15 min PRN, Syed Ziegler MD    glucagon (Glucagen) injection 1 mg, 1 mg, intramuscular, q15 min PRN, Syed Ziegler MD    insulin lispro (HumaLOG) injection 0-10 Units, 0-10 Units, subcutaneous, TID with meals, Syed Ziegler MD    melatonin tablet 3 mg, 3 mg, oral, Nightly PRN, Vargas Ramon,     metoclopramide (Reglan) tablet 10 mg, " 10 mg, oral, q6h PRN **OR** metoclopramide (Reglan) injection 10 mg, 10 mg, intravenous, q6h PRN, Vargas Ramon DO    metoprolol tartrate (Lopressor) tablet 50 mg, 50 mg, oral, BID, Vargas Ramon DO, 50 mg at 10/28/23 0341    NIFEdipine ER (Adalat CC) 24 hr tablet 90 mg, 90 mg, oral, BID, Vargas Ramon DO, 90 mg at 10/28/23 0420    ondansetron ODT (Zofran-ODT) disintegrating tablet 4 mg, 4 mg, oral, q8h PRN **OR** ondansetron (Zofran) injection 4 mg, 4 mg, intravenous, q8h PRN, Vargas Ramon DO, 4 mg at 10/28/23 0328    oxyCODONE (Roxicodone) immediate release tablet 10 mg, 10 mg, oral, q6h PRN, Vargas Ramon DO    oxyCODONE (Roxicodone) immediate release tablet 5 mg, 5 mg, oral, q6h PRN, Vargas Ramon DO, 5 mg at 10/28/23 0328    pantoprazole (ProtoNix) EC tablet 40 mg, 40 mg, oral, Daily before breakfast, 40 mg at 10/28/23 0719 **OR** pantoprazole (ProtoNix) injection 40 mg, 40 mg, intravenous, Daily before breakfast, Vargas Ramon DO    piperacillin-tazobactam-dextrose (Zosyn) IV 3.375 g, 3.375 g, intravenous, q12h, Vargas Ramon DO, Stopped at 10/28/23 1534    pneumococcal conjugate vaccine, 13-valent (PREVNAR 13), 0.5 mL, intramuscular, During hospitalization, Vargas Ramon DO    polyethylene glycol (Glycolax, Miralax) packet 17 g, 17 g, oral, Daily PRN, Vargas Ramon DO    predniSONE (Deltasone) tablet 25 mg, 25 mg, oral, Daily, Syed Ziegler MD, 25 mg at 10/28/23 1444     Results for orders placed or performed during the hospital encounter of 10/27/23 (from the past 24 hour(s))   Blood Culture    Specimen: Arterial Line; Blood culture   Result Value Ref Range    Blood Culture Loaded on Instrument - Culture in progress    Blood Culture    Specimen: Arterial Line; Blood culture   Result Value Ref Range    Blood Culture Loaded on Instrument - Culture in progress    Comprehensive metabolic panel   Result Value Ref Range    Glucose 127 (H) 74 - 99 mg/dL    Sodium 142 136 - 145  mmol/L    Potassium 3.8 3.5 - 5.3 mmol/L    Chloride 105 98 - 107 mmol/L    Bicarbonate 23 21 - 32 mmol/L    Anion Gap 18 10 - 20 mmol/L    Urea Nitrogen 63 (H) 6 - 23 mg/dL    Creatinine 2.99 (H) 0.50 - 1.05 mg/dL    eGFR 17 (L) >60 mL/min/1.73m*2    Calcium 9.4 8.6 - 10.3 mg/dL    Albumin 3.7 3.4 - 5.0 g/dL    Alkaline Phosphatase 75 33 - 136 U/L    Total Protein 6.0 (L) 6.4 - 8.2 g/dL    AST 11 9 - 39 U/L    Bilirubin, Total 0.4 0.0 - 1.2 mg/dL    ALT 17 7 - 45 U/L   Lactate   Result Value Ref Range    Lactate 2.2 (H) 0.4 - 2.0 mmol/L   Lactate   Result Value Ref Range    Lactate 2.1 (H) 0.4 - 2.0 mmol/L   CBC   Result Value Ref Range    WBC 15.9 (H) 4.4 - 11.3 x10*3/uL    nRBC 0.0 0.0 - 0.0 /100 WBCs    RBC 3.67 (L) 4.00 - 5.20 x10*6/uL    Hemoglobin 10.9 (L) 12.0 - 16.0 g/dL    Hematocrit 34.7 (L) 36.0 - 46.0 %    MCV 95 80 - 100 fL    MCH 29.7 26.0 - 34.0 pg    MCHC 31.4 (L) 32.0 - 36.0 g/dL    RDW 15.1 (H) 11.5 - 14.5 %    Platelets 266 150 - 450 x10*3/uL    MPV 10.0 7.5 - 11.5 fL   Comprehensive metabolic panel   Result Value Ref Range    Glucose 94 74 - 99 mg/dL    Sodium 139 136 - 145 mmol/L    Potassium 3.7 3.5 - 5.3 mmol/L    Chloride 103 98 - 107 mmol/L    Bicarbonate 23 21 - 32 mmol/L    Anion Gap 17 10 - 20 mmol/L    Urea Nitrogen 54 (H) 6 - 23 mg/dL    Creatinine 2.65 (H) 0.50 - 1.05 mg/dL    eGFR 20 (L) >60 mL/min/1.73m*2    Calcium 8.5 (L) 8.6 - 10.3 mg/dL    Albumin 3.4 3.4 - 5.0 g/dL    Alkaline Phosphatase 70 33 - 136 U/L    Total Protein 5.5 (L) 6.4 - 8.2 g/dL    AST 12 9 - 39 U/L    Bilirubin, Total 0.5 0.0 - 1.2 mg/dL    ALT 15 7 - 45 U/L   Urinalysis with Reflex Microscopic and Culture   Result Value Ref Range    Color, Urine Straw Straw, Yellow    Appearance, Urine Clear Clear    Specific Gravity, Urine 1.009 1.005 - 1.035    pH, Urine 6.0 5.0, 5.5, 6.0, 6.5, 7.0, 7.5, 8.0    Protein, Urine 30 (1+) (N) NEGATIVE mg/dL    Glucose, Urine 50 (1+) (A) NEGATIVE mg/dL    Blood, Urine NEGATIVE  NEGATIVE    Ketones, Urine NEGATIVE NEGATIVE mg/dL    Bilirubin, Urine NEGATIVE NEGATIVE    Urobilinogen, Urine <2.0 <2.0 mg/dL    Nitrite, Urine NEGATIVE NEGATIVE    Leukocyte Esterase, Urine NEGATIVE NEGATIVE   Extra Urine Gray Tube   Result Value Ref Range    Extra Tube Hold for add-ons.    Urinalysis Microscopic   Result Value Ref Range    WBC, Urine 1-5 1-5, NONE /HPF    RBC, Urine NONE NONE, 1-2, 3-5 /HPF    Squamous Epithelial Cells, Urine 1-9 (SPARSE) Reference range not established. /HPF      Vascular US PVR without exercise    Result Date: 10/28/2023            [facility] [address] [place] [address]     Tel [phone] Fax [phone]  Vascular Lab Report  VASC US PVR WITHOUT EXERCISE Patient Name:     LOULOU Goldman Physician: [id number] Christelle Lawrence MD Study Date:       10/28/2023          Ordering Provider: [id number] DELANEY MAGANA MRN/PID:          [id number]            Fellow: Accession#:       [id number]        Technologist:      Ramon MA,                                                          RVT Date of           1960 / 63      Technologist 2: Birth/Age:        years Gender:           F                   Encounter#:        [id number] Admission Status: Inpatient           Location           [facility]                                       Performed:  Diagnosis/ICD: Atherosclerosis of native arteries of extremities with                intermittent claudication, bilateral legs-I70.213 CPT Codes:     93593 Peripheral artery PVR (multi segmental pressure  Smoker:            Former. Pertinent History: HTN, Hyperlipidemia and CVA. CHF.  CONCLUSIONS: Right Lower PVR: No evidence of arterial occlusive disease in the right lower extremity at rest. Right pressures of >220 mmHg suggest no compressibility of vessels and may make absolute Segmental Limb Pressures (SLP) unreliable. Decreased  digital perfusion noted. Biphasic flow is noted in the right dorsalis pedis artery. Triphasic flow is noted in the right common femoral artery, right superficial femoral artery, right popliteal artery and right posterior tibial artery. Severity of the disease called by tracing. Abnormal toe PPG waveform. Left Lower PVR: No evidence of arterial occlusive disease in the left lower extremity at rest. Decreased digital perfusion noted. Triphasic flow is noted in the left common femoral artery, left superficial femoral artery, left popliteal artery, left posterior tibial artery and left dorsalis pedis artery. Severity of the disease called by tracing. Abnormal toe PPG waveform.  Imaging & Doppler Findings:  RIGHT Lower PVR                Pressures Ratios Right High Thigh               185 mmHg  1.47 Right Low Thigh                182 mmHg  1.44 Right Calf                     137 mmHg  1.09 Right Posterior Tibial (Ankle) 210 mmHg  1.67 Right Dorsalis Pedis (Ankle)   250 mmHg  1.98 Right Digit (Great Toe)        103 mmHg  0.82   LEFT Lower PVR                Pressures Ratios Left High Thigh               205 mmHg  1.63 Left Low Thigh                191 mmHg  1.52 Left Calf                     142 mmHg  1.13 Left Posterior Tibial (Ankle) 192 mmHg  1.52 Left Dorsalis Pedis (Ankle)   173 mmHg  1.37 Left Digit (Great Toe)        138 mmHg  1.10                      Right     Left Brachial Pressure 119 mmHg 126 mmHg   [id number] Christelle Lawrence MD Electronically signed by [id number] Christelle Lawrence MD on 10/28/2023 at 3:58:10 PM  ** Final **     Vascular US lower extremity venous duplex bilateral    Result Date: 10/28/2023            [facility] [address] [place] [address]     Tel [phone] Fax [phone]  Vascular Lab Report  VAS US LOWER EXTREMITY VENOUS DUPLEX BILATERAL Patient Name:     LOULOU LEHMAN       Reading Physician: [id number] Christelle Lawrence                                                           MD Study Date:       10/28/2023          Ordering Provider: [id number] ARCENIO BRASWELL MRN/PID:          [id number]            Fellow: Accession#:       [id number]        Technologist:      Ramon MA,                                                          RVT Date of           1960 / 63      Technologist 2: Birth/Age:        years Gender:           F                   Encounter#:        [id number] Admission Status: Inpatient           Location           [facility]                                       Performed:  Diagnosis/ICD: Localized (leg) edema-R60.0 CPT Codes:     19098 Peripheral venous duplex scan for DVT complete  Smoker:            Former. Pertinent History: HTN, Hyperlipidemia and CVD. CKD.  CONCLUSIONS: Right Lower Venous: No evidence of acute deep vein thrombus visualized in the right lower extremity. Poorly visualized calf veins. Left Lower Venous: No evidence of acute deep vein thrombus visualized in the left lower extremity. Calf veins visualized in segments. Additional Findings: Technically difficult exam.  Comparison: Compared with study from 5/19/2023, no significant change.No evidence DVT bilateral lower extremities.  Imaging & Doppler Findings:  Right Compress Thrombus SFJ     Yes      None  Left Compress Thrombus SFJ    Yes      None  Right                 Compressible Thrombus        Flow Distal External Iliac     Yes        None   Spontaneous/Phasic CFV                       Yes        None   Spontaneous/Phasic PFV                       Yes        None FV Proximal               Yes        None   Spontaneous/Phasic FV Mid                    Yes        None FV Distal                 Yes        None Popliteal                 Yes        None   Spontaneous/Phasic  Left                  Compress Thrombus        Flow Distal External Iliac   Yes      None   Spontaneous/Phasic CFV                     Yes      None   Spontaneous/Phasic PFV                     Yes      None FV  Proximal             Yes      None   Spontaneous/Phasic FV Mid                  Yes      None FV Distal               Yes      None Popliteal               Yes      None   Spontaneous/Phasic Peroneal                Yes      None PTV                     Yes      None  [id number] Christelle Lawrence MD Electronically signed by [id number] Christelle Lawrence MD on 10/28/2023 at 3:45:32 PM  ** Final **     XR foot right 1-2 views    Result Date: 10/27/2023  STUDY: Foot Radiographs; 10/27/2023 8:35 PM INDICATION: Right foot infection.  Evaluate for evidence of osteomyelitis. COMPARISON: None available. ACCESSION NUMBER(S): [id number] ORDERING CLINICIAN: ARCENIO BRASWELL TECHNIQUE:  Two view(s) of the right foot. FINDINGS:  There is no displaced fracture.  The alignment is anatomic.  Wound VAC seen medial ankle.  Soft tissue swelling dorsum of the forefoot.     No fracture. Signed by Jose Pena MD    XR tibia fibula right 2 views    Result Date: 10/27/2023  STUDY: Tibia and Fibula Radiographs; 10/27/2023 at 8:15 PM. INDICATION: Right foot wound. COMPARISON: XR right tibia/fibula 2/13/2022. ACCESSION NUMBER(S): [id number] ORDERING CLINICIAN: ARCENIO BRASWELL TECHNIQUE:  Two view(s) of the right tibia and fibula (four images). FINDINGS:  Intramedullary benjie with lower leg soft tissue swelling.  Distal fibular reconstruction plate and screw.  There is no displaced fracture.  The alignment is anatomic.      No fracture. Signed by Jose Pena MD       Assessment/Plan     Right leg cellulitis  Patient seen and evaluated. She does have significant cellulitis of the right lower extremity. Pedal pulses are palpable on exam. DENNIS/PVR study reveals normal DENNIS's and TBI's bilaterally with triphasic waveforms down to the pedal level. There is no e/o arterial disease. I would not recommend any further vascular intervention at this time. Will defer further care to wound care team and infectious disease.    I spent 45 minutes in the professional and  overall care of this patient.

## 2023-10-28 NOTE — CARE PLAN
The patient's goals for the shift include      The clinical goals for the shift include see care plan      Problem: Pain - Adult  Goal: Verbalizes/displays adequate comfort level or baseline comfort level  Outcome: Progressing     Problem: Safety - Adult  Goal: Free from fall injury  Outcome: Progressing     Problem: Discharge Planning  Goal: Discharge to home or other facility with appropriate resources  Outcome: Progressing     Problem: Chronic Conditions and Co-morbidities  Goal: Patient's chronic conditions and co-morbidity symptoms are monitored and maintained or improved  Outcome: Progressing     Problem: Fall/Injury  Goal: Not fall by end of shift  Outcome: Progressing  Goal: Be free from injury by end of the shift  Outcome: Progressing  Goal: Verbalize understanding of personal risk factors for fall in the hospital  Outcome: Progressing  Goal: Verbalize understanding of risk factor reduction measures to prevent injury from fall in the home  Outcome: Progressing  Goal: Use assistive devices by end of the shift  Outcome: Progressing  Goal: Pace activities to prevent fatigue by end of the shift  Outcome: Progressing     Problem: Pain  Goal: Takes deep breaths with improved pain control throughout the shift  Outcome: Progressing  Goal: Turns in bed with improved pain control throughout the shift  Outcome: Progressing  Goal: Walks with improved pain control throughout the shift  Outcome: Progressing  Goal: Performs ADL's with improved pain control throughout shift  Outcome: Progressing  Goal: Participates in PT with improved pain control throughout the shift  Outcome: Progressing  Goal: Free from opioid side effects throughout the shift  Outcome: Progressing  Goal: Free from acute confusion related to pain meds throughout the shift  Outcome: Progressing     Pt has been resting, up to bsc with assist. Wound nurse changed vac over to hospital vac. Pt denies pain. Visitos in. IV atbs per order. Pt refused sugar  check this evening, states she is not diabetic

## 2023-10-28 NOTE — ED PROVIDER NOTES
HPI   Chief Complaint   Patient presents with    foot pain     Outside of right foot       Patient is a 63-year-old female with history of chronic right foot wound w/ wound vac in place, CKD 4, polymyalgia rheumatica, and giant cell arteritis on daily steroid therapy presenting with increasing pain and swelling to her wound site for the past 24 hours.  Patient states that home nursing comes to check on her wound 3 times a week, and today recommended patient come in for evaluation.  Patient also reports a recent history of DVT and is currently on Eliquis for this.  She denies fever/chills, chest pain, nausea/vomiting.  She currently follows with Dr. Busby of infectious disease and has been taking doxycycline for the past 6 weeks.  Per chart review, patient's wound previously grew MRSA and Pseudomonas. The wound is related to orthopedic surgery several years ago that pt had for right tib/fib fracture.                       No data recorded                Patient History   Past Medical History:   Diagnosis Date    Essential (primary) hypertension 2022    Hypertension, essential, benign     Past Surgical History:   Procedure Laterality Date    OTHER SURGICAL HISTORY  10/21/2022    Mastectomy    OTHER SURGICAL HISTORY  10/21/2022    Lower leg fracture repair    OTHER SURGICAL HISTORY  10/21/2022    Ankle fracture repair    OTHER SURGICAL HISTORY  10/21/2022    Lower leg fracture repair    OTHER SURGICAL HISTORY  10/21/2022    Colonoscopy    OTHER SURGICAL HISTORY  10/21/2022    Temporal artery biopsy    OTHER SURGICAL HISTORY  10/21/2022    Mastectomy bilateral    OTHER SURGICAL HISTORY  10/21/2022    Appendectomy     Family History   Problem Relation Name Age of Onset    Other (Arteriossclerrosis of nonautologous coronary artery bypass graft without angina pectoris) Sibling       Social History     Tobacco Use    Smoking status: Former     Types: Cigarettes     Quit date: 1982     Years since quittin.8     Smokeless tobacco: Never   Substance Use Topics    Alcohol use: Yes     Alcohol/week: 3.0 standard drinks of alcohol     Types: 3 Shots of liquor per week    Drug use: Never       Physical Exam   ED Triage Vitals [10/27/23 1411]   Temp Heart Rate Resp BP   36.4 °C (97.5 °F) 101 18 136/78      SpO2 Temp Source Heart Rate Source Patient Position   98 % Temporal -- Sitting      BP Location FiO2 (%)     Left arm --       Physical Exam  Vitals and nursing note reviewed.   Constitutional:       General: She is not in acute distress.     Appearance: Normal appearance. She is not toxic-appearing.   HENT:      Head: Normocephalic and atraumatic.      Right Ear: Tympanic membrane, ear canal and external ear normal.      Left Ear: Tympanic membrane, ear canal and external ear normal.      Nose: Nose normal.      Mouth/Throat:      Mouth: Mucous membranes are moist.      Pharynx: No oropharyngeal exudate or posterior oropharyngeal erythema.   Eyes:      General: No scleral icterus.     Extraocular Movements: Extraocular movements intact.      Pupils: Pupils are equal, round, and reactive to light.   Cardiovascular:      Rate and Rhythm: Normal rate and regular rhythm.      Pulses: Normal pulses.      Heart sounds: Normal heart sounds. No murmur heard.     No friction rub. No gallop.   Pulmonary:      Effort: Pulmonary effort is normal. No respiratory distress.      Breath sounds: Normal breath sounds. No stridor. No wheezing, rhonchi or rales.   Abdominal:      General: Abdomen is flat. Bowel sounds are normal. There is no distension.      Palpations: Abdomen is soft. There is no mass.      Tenderness: There is no abdominal tenderness. There is no guarding or rebound.      Hernia: No hernia is present.   Musculoskeletal:         General: No swelling, deformity or signs of injury. Normal range of motion.      Cervical back: Normal range of motion and neck supple. No rigidity.      Right lower le+ Edema present.      Left  lower leg: No edema.        Legs:       Comments: Tenderness to palpation to right lateral malleolus   Wound vac in place to right lower extremity with minimal erythema extending from this area    Lymphadenopathy:      Cervical: No cervical adenopathy.   Skin:     General: Skin is warm and dry.      Capillary Refill: Capillary refill takes less than 2 seconds.      Findings: No rash.   Neurological:      General: No focal deficit present.      Mental Status: She is alert and oriented to person, place, and time. Mental status is at baseline.      Cranial Nerves: No cranial nerve deficit.      Sensory: No sensory deficit.      Motor: No weakness.      Coordination: Coordination normal.   Psychiatric:         Mood and Affect: Mood normal.         Behavior: Behavior normal.       ED Course & MDM   ED Course as of 10/27/23 2341   Fri Oct 27, 2023   2028 Pt is tachycardic with leukocytosis - sepsis workup in progress including blood cultures, fluids, lactate and abx.  [JR]   2121 Discussed with Dr. Ramon who agrees to admission.  [JR]      ED Course User Index  [JR] Harley Sevilla,          Diagnoses as of 10/27/23 2341   Cellulitis of leg, right       Medical Decision Making  Pt is a 64 y/o female with history of chronic RLE wound, DVT on Eliquis, and CKD 4 presenting with worsening of her chronic wound. In the ED, she was tachycardic with stable blood pressure and normal respiratory rate. She was non-toxic appearing. She was also afebrile. Initial laboratory evaluation showed leukocytosis to 18, meeting criteria for sepsis. At that point, lactate, blood cultures, broad spectrum antibiotics, and 1000mL fluid bolus were ordered. Pt was discussed with Dr. Ramon, hospitalist who agreed to admission to his service for further monitoring and treatment.     Pt was signed out to oncoming Resident Dr. Meg Castaneda while awaiting transfer to the floor.             Discussed with attending physician Dr. Escamilla.      Harley WOODWARD  DO Harley Sevilla DO  Resident  10/27/23 1060

## 2023-10-29 LAB
ANION GAP SERPL CALC-SCNC: 12 MMOL/L (ref 10–20)
BUN SERPL-MCNC: 42 MG/DL (ref 6–23)
CALCIUM SERPL-MCNC: 8.4 MG/DL (ref 8.6–10.3)
CHLORIDE SERPL-SCNC: 104 MMOL/L (ref 98–107)
CO2 SERPL-SCNC: 26 MMOL/L (ref 21–32)
CREAT SERPL-MCNC: 2.28 MG/DL (ref 0.5–1.05)
ERYTHROCYTE [DISTWIDTH] IN BLOOD BY AUTOMATED COUNT: 15.3 % (ref 11.5–14.5)
GFR SERPL CREATININE-BSD FRML MDRD: 24 ML/MIN/1.73M*2
GLUCOSE SERPL-MCNC: 111 MG/DL (ref 74–99)
HCT VFR BLD AUTO: 33.3 % (ref 36–46)
HGB BLD-MCNC: 10.5 G/DL (ref 12–16)
MCH RBC QN AUTO: 29.6 PG (ref 26–34)
MCHC RBC AUTO-ENTMCNC: 31.5 G/DL (ref 32–36)
MCV RBC AUTO: 94 FL (ref 80–100)
NRBC BLD-RTO: 0 /100 WBCS (ref 0–0)
PLATELET # BLD AUTO: 257 X10*3/UL (ref 150–450)
PMV BLD AUTO: 10.1 FL (ref 7.5–11.5)
POTASSIUM SERPL-SCNC: 4.2 MMOL/L (ref 3.5–5.3)
RBC # BLD AUTO: 3.55 X10*6/UL (ref 4–5.2)
SODIUM SERPL-SCNC: 138 MMOL/L (ref 136–145)
VANCOMYCIN SERPL-MCNC: 16.2 UG/ML (ref 5–20)
WBC # BLD AUTO: 14.9 X10*3/UL (ref 4.4–11.3)

## 2023-10-29 PROCEDURE — 2500000001 HC RX 250 WO HCPCS SELF ADMINISTERED DRUGS (ALT 637 FOR MEDICARE OP): Performed by: INTERNAL MEDICINE

## 2023-10-29 PROCEDURE — 36415 COLL VENOUS BLD VENIPUNCTURE: CPT | Performed by: INTERNAL MEDICINE

## 2023-10-29 PROCEDURE — 2500000001 HC RX 250 WO HCPCS SELF ADMINISTERED DRUGS (ALT 637 FOR MEDICARE OP): Performed by: STUDENT IN AN ORGANIZED HEALTH CARE EDUCATION/TRAINING PROGRAM

## 2023-10-29 PROCEDURE — 80202 ASSAY OF VANCOMYCIN: CPT | Performed by: INTERNAL MEDICINE

## 2023-10-29 PROCEDURE — 1100000001 HC PRIVATE ROOM DAILY

## 2023-10-29 PROCEDURE — 99232 SBSQ HOSP IP/OBS MODERATE 35: CPT | Performed by: STUDENT IN AN ORGANIZED HEALTH CARE EDUCATION/TRAINING PROGRAM

## 2023-10-29 PROCEDURE — 2500000004 HC RX 250 GENERAL PHARMACY W/ HCPCS (ALT 636 FOR OP/ED): Performed by: STUDENT IN AN ORGANIZED HEALTH CARE EDUCATION/TRAINING PROGRAM

## 2023-10-29 PROCEDURE — 85027 COMPLETE CBC AUTOMATED: CPT | Performed by: STUDENT IN AN ORGANIZED HEALTH CARE EDUCATION/TRAINING PROGRAM

## 2023-10-29 PROCEDURE — 80048 BASIC METABOLIC PNL TOTAL CA: CPT | Performed by: STUDENT IN AN ORGANIZED HEALTH CARE EDUCATION/TRAINING PROGRAM

## 2023-10-29 PROCEDURE — 2500000004 HC RX 250 GENERAL PHARMACY W/ HCPCS (ALT 636 FOR OP/ED): Performed by: INTERNAL MEDICINE

## 2023-10-29 PROCEDURE — 36415 COLL VENOUS BLD VENIPUNCTURE: CPT | Performed by: STUDENT IN AN ORGANIZED HEALTH CARE EDUCATION/TRAINING PROGRAM

## 2023-10-29 RX ADMIN — METOPROLOL TARTRATE 50 MG: 50 TABLET, FILM COATED ORAL at 20:09

## 2023-10-29 RX ADMIN — APIXABAN 2.5 MG: 2.5 TABLET, FILM COATED ORAL at 08:11

## 2023-10-29 RX ADMIN — PANTOPRAZOLE SODIUM 40 MG: 40 TABLET, DELAYED RELEASE ORAL at 06:17

## 2023-10-29 RX ADMIN — PIPERACILLIN SODIUM AND TAZOBACTAM SODIUM 3.38 G: 3; .375 INJECTION, SOLUTION INTRAVENOUS at 10:57

## 2023-10-29 RX ADMIN — APIXABAN 2.5 MG: 2.5 TABLET, FILM COATED ORAL at 20:10

## 2023-10-29 RX ADMIN — ASPIRIN 81 MG: 81 TABLET, COATED ORAL at 08:08

## 2023-10-29 RX ADMIN — METOPROLOL TARTRATE 50 MG: 50 TABLET, FILM COATED ORAL at 08:07

## 2023-10-29 RX ADMIN — PREDNISONE 25 MG: 5 TABLET ORAL at 08:07

## 2023-10-29 RX ADMIN — AMITRIPTYLINE HYDROCHLORIDE 25 MG: 50 TABLET, FILM COATED ORAL at 20:10

## 2023-10-29 RX ADMIN — VANCOMYCIN HYDROCHLORIDE 1000 MG: 1 INJECTION, SOLUTION INTRAVENOUS at 01:20

## 2023-10-29 RX ADMIN — PIPERACILLIN SODIUM AND TAZOBACTAM SODIUM 3.38 G: 3; .375 INJECTION, SOLUTION INTRAVENOUS at 01:20

## 2023-10-29 RX ADMIN — PIPERACILLIN SODIUM AND TAZOBACTAM SODIUM 3.38 G: 3; .375 INJECTION, SOLUTION INTRAVENOUS at 23:45

## 2023-10-29 RX ADMIN — NIFEDIPINE 90 MG: 60 TABLET, FILM COATED, EXTENDED RELEASE ORAL at 20:10

## 2023-10-29 RX ADMIN — NIFEDIPINE 90 MG: 60 TABLET, FILM COATED, EXTENDED RELEASE ORAL at 08:08

## 2023-10-29 ASSESSMENT — COGNITIVE AND FUNCTIONAL STATUS - GENERAL
TOILETING: A LITTLE
PERSONAL GROOMING: A LITTLE
DRESSING REGULAR UPPER BODY CLOTHING: A LITTLE
HELP NEEDED FOR BATHING: A LITTLE
MOBILITY SCORE: 18
TURNING FROM BACK TO SIDE WHILE IN FLAT BAD: A LITTLE
DRESSING REGULAR LOWER BODY CLOTHING: A LITTLE
CLIMB 3 TO 5 STEPS WITH RAILING: A LITTLE
WALKING IN HOSPITAL ROOM: A LITTLE
MOVING TO AND FROM BED TO CHAIR: A LITTLE
STANDING UP FROM CHAIR USING ARMS: A LITTLE
MOVING FROM LYING ON BACK TO SITTING ON SIDE OF FLAT BED WITH BEDRAILS: A LITTLE

## 2023-10-29 ASSESSMENT — PAIN - FUNCTIONAL ASSESSMENT: PAIN_FUNCTIONAL_ASSESSMENT: 0-10

## 2023-10-29 ASSESSMENT — PAIN SCALES - GENERAL
PAINLEVEL_OUTOF10: 0 - NO PAIN
PAINLEVEL_OUTOF10: 0 - NO PAIN

## 2023-10-29 NOTE — PROGRESS NOTES
"Vancomycin Dosing by Pharmacy- FOLLOW UP    Estephania Hernandez is a 63 y.o. year old female who Pharmacy has been consulted for vancomycin dosing for cellulitis, skin and soft tissue. Based on the patient's indication and renal status this patient is being dosed based on a goal trough/random level of 10-15.     Renal function is currently stable.      Most recent random level: 7 mcg/mL    Visit Vitals  /83   Pulse (!) 114   Temp 37.2 °C (99 °F) (Temporal)   Resp 18        Lab Results   Component Value Date    CREATININE 2.65 (H) 10/28/2023    CREATININE 2.99 (H) 10/28/2023    CREATININE 2.71 (H) 09/26/2023    CREATININE 2.66 (H) 08/16/2023    CREATININE 2.36 (H) 08/09/2023    CREATININE 2.36 (H) 08/02/2023        Patient weight is No results found for: \"PTWEIGHT\"    No results found for: \"CULTURE\"     I/O last 3 completed shifts:  In: 1980 (26.5 mL/kg) [P.O.:580; I.V.:300 (4 mL/kg); IV Piggyback:1100]  Out: - (0 mL/kg)   Weight: 74.8 kg   [unfilled]    Lab Results   Component Value Date    PATIENTTEMP 37.0 05/20/2023    PATIENTTEMP 37.0 05/20/2023    PATIENTTEMP 37.0 05/19/2023        Assessment/Plan    Vancomycin 1 gram IV x 1 ordered today  The next level will be obtained on 10/29 at 2300. May be obtained sooner if clinically indicated.   Will continue to monitor renal function daily while on vancomycin and order serum creatinine at least every 48 hours if not already ordered.  Follow for continued vancomycin needs, clinical response, and signs/symptoms of toxicity.       Champ Dorado, PharmD           "

## 2023-10-29 NOTE — PROGRESS NOTES
"Estephania Hernandez is a 63 y.o. female on day 1 of admission presenting with Cellulitis of leg, right.    Subjective   No acute events overnight, pt states she feels her RLE is improving, pain is better. No other complains at this time.        Objective     Physical Exam  Constitutional:       Appearance: Normal appearance.   HENT:      Head: Normocephalic and atraumatic.   Eyes:      Pupils: Pupils are equal, round, and reactive to light.   Cardiovascular:      Rate and Rhythm: Normal rate and regular rhythm.   Pulmonary:      Effort: Pulmonary effort is normal.      Breath sounds: Normal breath sounds.   Abdominal:      General: Abdomen is flat. Bowel sounds are normal.      Palpations: Abdomen is soft.   Musculoskeletal:         General: Swelling and tenderness present. Normal range of motion.      Cervical back: Neck supple.      Right lower leg: Edema present.      Comments: Wound vac on the medial R-lower leg   Skin:     General: Skin is warm.      Findings: Erythema (in the RLE) present, improving.   Neurological:      General: No focal deficit present.      Mental Status: She is alert and oriented to person, place, and time.   Psychiatric:         Mood and Affect: Mood normal.         Behavior: Behavior normal.     Last Recorded Vitals  Blood pressure 117/80, pulse 77, temperature 36.3 °C (97.3 °F), resp. rate 18, height 1.575 m (5' 2\"), weight 74.8 kg (165 lb), SpO2 96 %.  Intake/Output last 3 Shifts:  I/O last 3 completed shifts:  In: 2180 (29.1 mL/kg) [P.O.:580; I.V.:300 (4 mL/kg); IV Piggyback:1300]  Out: - (0 mL/kg)   Weight: 74.8 kg     Relevant Results  Scheduled medications  amitriptyline, 25 mg, oral, Nightly  apixaban, 2.5 mg, oral, BID  aspirin, 81 mg, oral, Daily  insulin lispro, 0-10 Units, subcutaneous, TID with meals  metoprolol tartrate, 50 mg, oral, BID  NIFEdipine ER, 90 mg, oral, BID  pantoprazole, 40 mg, oral, Daily before breakfast   Or  pantoprazole, 40 mg, intravenous, Daily before " breakfast  piperacillin-tazobactam, 3.375 g, intravenous, q12h  pneumococcal conjugate, 0.5 mL, intramuscular, During hospitalization  predniSONE, 25 mg, oral, Daily      Continuous medications     PRN medications  PRN medications: acetaminophen **OR** acetaminophen **OR** acetaminophen, acetaminophen **OR** acetaminophen **OR** acetaminophen, dextrose 10 % in water (D10W), dextrose, glucagon, melatonin, metoclopramide **OR** metoclopramide, ondansetron ODT **OR** ondansetron, oxyCODONE, oxyCODONE, polyethylene glycol  Results from last 7 days   Lab Units 10/29/23  1028 10/28/23  0629 10/27/23  1737   WBC AUTO x10*3/uL 14.9* 15.9* 18.8*   RBC AUTO x10*6/uL 3.55* 3.67* 4.76   HEMOGLOBIN g/dL 10.5* 10.9* 14.1     Results from last 7 days   Lab Units 10/29/23  1028 10/28/23  0629 10/28/23  0004   SODIUM mmol/L 138 139 142   POTASSIUM mmol/L 4.2 3.7 3.8   CHLORIDE mmol/L 104 103 105   CO2 mmol/L 26 23 23   BUN mg/dL 42* 54* 63*   CREATININE mg/dL 2.28* 2.65* 2.99*   CALCIUM mg/dL 8.4* 8.5* 9.4   BILIRUBIN TOTAL mg/dL  --  0.5 0.4   ALT U/L  --  15 17   AST U/L  --  12 11         Assessment/Plan   Principal Problem:    Cellulitis of leg, right  Active Problems:    Hypertension, essential, benign    HFrEF (heart failure with reduced ejection fraction) (CMS/[facility])    PMR (polymyalgia rheumatica) (CMS/[facility])    Non-healing wound of lower extremity    DVT (deep venous thrombosis) (CMS/[facility])    Type 2 diabetes mellitus with diabetic dermatitis, with long-term current use of insulin (CMS/[facility])    Cellulitis of right lower extremity    -continue vanc/zosyn per ID recommendation  -continue follow up with outpatient ID and hyperbaric oxygen  -may consider CT/MRI imaging of lower extremities if pain is persistent to rule out underlying bony abnormalities (X-ray did not show any acute findings)  -continue Eliquis (reduced dose) due to recurrent dvt, patient follows with Dr. Valles, please see note on 10/2/2023  -vascular consulted due to  concern for recurrent cellulitis/wound. DENNIS/PVR study reviewed, no e/o arterial disease, no vascular intervention needed  -US doppler negative for dvt  -SSI coverage  -continue home dose prednisone for PMR  -dvt prophylaxis with eliquis          I spent 35 minutes in the professional and overall care of this patient.      Syed Ziegler MD

## 2023-10-29 NOTE — CARE PLAN
PT has been resting, up in chair as well. Wound vac in use. IV atbs per order. Dr barrett rounded, no new orders.

## 2023-10-30 ENCOUNTER — HOME HEALTH ADMISSION (OUTPATIENT)
Dept: HOME HEALTH SERVICES | Facility: HOME HEALTH | Age: 63
End: 2023-10-30
Payer: MEDICAID

## 2023-10-30 ENCOUNTER — APPOINTMENT (OUTPATIENT)
Dept: WOUND CARE | Facility: CLINIC | Age: 63
End: 2023-10-30
Payer: MEDICAID

## 2023-10-30 VITALS
SYSTOLIC BLOOD PRESSURE: 132 MMHG | RESPIRATION RATE: 18 BRPM | HEIGHT: 62 IN | WEIGHT: 165 LBS | OXYGEN SATURATION: 100 % | TEMPERATURE: 96.8 F | BODY MASS INDEX: 30.36 KG/M2 | DIASTOLIC BLOOD PRESSURE: 81 MMHG | HEART RATE: 69 BPM

## 2023-10-30 LAB
ANION GAP SERPL CALC-SCNC: 14 MMOL/L (ref 10–20)
BUN SERPL-MCNC: 36 MG/DL (ref 6–23)
CALCIUM SERPL-MCNC: 9 MG/DL (ref 8.6–10.3)
CHLORIDE SERPL-SCNC: 105 MMOL/L (ref 98–107)
CO2 SERPL-SCNC: 25 MMOL/L (ref 21–32)
CREAT SERPL-MCNC: 2.37 MG/DL (ref 0.5–1.05)
ERYTHROCYTE [DISTWIDTH] IN BLOOD BY AUTOMATED COUNT: 15.4 % (ref 11.5–14.5)
GFR SERPL CREATININE-BSD FRML MDRD: 23 ML/MIN/1.73M*2
GLUCOSE SERPL-MCNC: 93 MG/DL (ref 74–99)
HCT VFR BLD AUTO: 36.5 % (ref 36–46)
HGB BLD-MCNC: 11.6 G/DL (ref 12–16)
MAGNESIUM SERPL-MCNC: 1.55 MG/DL (ref 1.6–2.4)
MCH RBC QN AUTO: 29.9 PG (ref 26–34)
MCHC RBC AUTO-ENTMCNC: 31.8 G/DL (ref 32–36)
MCV RBC AUTO: 94 FL (ref 80–100)
NRBC BLD-RTO: 0 /100 WBCS (ref 0–0)
PLATELET # BLD AUTO: 281 X10*3/UL (ref 150–450)
PMV BLD AUTO: 10.1 FL (ref 7.5–11.5)
POTASSIUM SERPL-SCNC: 3.7 MMOL/L (ref 3.5–5.3)
RBC # BLD AUTO: 3.88 X10*6/UL (ref 4–5.2)
SODIUM SERPL-SCNC: 140 MMOL/L (ref 136–145)
STAPHYLOCOCCUS SPEC CULT: NORMAL
WBC # BLD AUTO: 11.6 X10*3/UL (ref 4.4–11.3)

## 2023-10-30 PROCEDURE — 36415 COLL VENOUS BLD VENIPUNCTURE: CPT | Performed by: STUDENT IN AN ORGANIZED HEALTH CARE EDUCATION/TRAINING PROGRAM

## 2023-10-30 PROCEDURE — 99239 HOSP IP/OBS DSCHRG MGMT >30: CPT | Performed by: STUDENT IN AN ORGANIZED HEALTH CARE EDUCATION/TRAINING PROGRAM

## 2023-10-30 PROCEDURE — 2500000001 HC RX 250 WO HCPCS SELF ADMINISTERED DRUGS (ALT 637 FOR MEDICARE OP): Performed by: INTERNAL MEDICINE

## 2023-10-30 PROCEDURE — 80048 BASIC METABOLIC PNL TOTAL CA: CPT | Performed by: STUDENT IN AN ORGANIZED HEALTH CARE EDUCATION/TRAINING PROGRAM

## 2023-10-30 PROCEDURE — 2500000004 HC RX 250 GENERAL PHARMACY W/ HCPCS (ALT 636 FOR OP/ED): Performed by: STUDENT IN AN ORGANIZED HEALTH CARE EDUCATION/TRAINING PROGRAM

## 2023-10-30 PROCEDURE — 2500000001 HC RX 250 WO HCPCS SELF ADMINISTERED DRUGS (ALT 637 FOR MEDICARE OP): Performed by: STUDENT IN AN ORGANIZED HEALTH CARE EDUCATION/TRAINING PROGRAM

## 2023-10-30 PROCEDURE — 85027 COMPLETE CBC AUTOMATED: CPT | Performed by: STUDENT IN AN ORGANIZED HEALTH CARE EDUCATION/TRAINING PROGRAM

## 2023-10-30 PROCEDURE — 2500000004 HC RX 250 GENERAL PHARMACY W/ HCPCS (ALT 636 FOR OP/ED): Performed by: INTERNAL MEDICINE

## 2023-10-30 PROCEDURE — 83735 ASSAY OF MAGNESIUM: CPT | Performed by: STUDENT IN AN ORGANIZED HEALTH CARE EDUCATION/TRAINING PROGRAM

## 2023-10-30 RX ORDER — VANCOMYCIN HYDROCHLORIDE 1 G/20ML
INJECTION, POWDER, LYOPHILIZED, FOR SOLUTION INTRAVENOUS DAILY PRN
Status: DISCONTINUED | OUTPATIENT
Start: 2023-10-30 | End: 2023-10-30

## 2023-10-30 RX ORDER — MAGNESIUM SULFATE HEPTAHYDRATE 40 MG/ML
2 INJECTION, SOLUTION INTRAVENOUS ONCE
Status: COMPLETED | OUTPATIENT
Start: 2023-10-30 | End: 2023-10-30

## 2023-10-30 RX ORDER — AMOXICILLIN AND CLAVULANATE POTASSIUM 500; 125 MG/1; MG/1
500 TABLET, FILM COATED ORAL EVERY 12 HOURS SCHEDULED
Status: DISCONTINUED | OUTPATIENT
Start: 2023-10-30 | End: 2023-10-30 | Stop reason: HOSPADM

## 2023-10-30 RX ORDER — AMOXICILLIN AND CLAVULANATE POTASSIUM 500; 125 MG/1; MG/1
1 TABLET, FILM COATED ORAL 2 TIMES DAILY
Qty: 20 TABLET | Refills: 0 | Status: SHIPPED | OUTPATIENT
Start: 2023-10-30 | End: 2023-11-09

## 2023-10-30 RX ORDER — VANCOMYCIN HYDROCHLORIDE 750 MG/150ML
750 INJECTION, SOLUTION INTRAVENOUS ONCE
Status: COMPLETED | OUTPATIENT
Start: 2023-10-30 | End: 2023-10-30

## 2023-10-30 RX ADMIN — PREDNISONE 25 MG: 5 TABLET ORAL at 08:51

## 2023-10-30 RX ADMIN — METOPROLOL TARTRATE 50 MG: 50 TABLET, FILM COATED ORAL at 08:51

## 2023-10-30 RX ADMIN — ASPIRIN 81 MG: 81 TABLET, COATED ORAL at 08:50

## 2023-10-30 RX ADMIN — NIFEDIPINE 90 MG: 60 TABLET, FILM COATED, EXTENDED RELEASE ORAL at 08:50

## 2023-10-30 RX ADMIN — PANTOPRAZOLE SODIUM 40 MG: 40 TABLET, DELAYED RELEASE ORAL at 06:31

## 2023-10-30 RX ADMIN — VANCOMYCIN HYDROCHLORIDE 750 MG: 750 INJECTION, SOLUTION INTRAVENOUS at 06:29

## 2023-10-30 RX ADMIN — MAGNESIUM SULFATE HEPTAHYDRATE 2 G: 40 INJECTION, SOLUTION INTRAVENOUS at 10:46

## 2023-10-30 RX ADMIN — AMOXICILLIN AND CLAVULANATE POTASSIUM 500 MG: 500; 125 TABLET, FILM COATED ORAL at 14:38

## 2023-10-30 RX ADMIN — PIPERACILLIN SODIUM AND TAZOBACTAM SODIUM 3.38 G: 3; .375 INJECTION, SOLUTION INTRAVENOUS at 12:36

## 2023-10-30 RX ADMIN — APIXABAN 2.5 MG: 2.5 TABLET, FILM COATED ORAL at 08:50

## 2023-10-30 ASSESSMENT — COGNITIVE AND FUNCTIONAL STATUS - GENERAL
CLIMB 3 TO 5 STEPS WITH RAILING: A LITTLE
MOVING TO AND FROM BED TO CHAIR: A LITTLE
DAILY ACTIVITIY SCORE: 18
HELP NEEDED FOR BATHING: A LITTLE
MOVING FROM LYING ON BACK TO SITTING ON SIDE OF FLAT BED WITH BEDRAILS: A LITTLE
DRESSING REGULAR UPPER BODY CLOTHING: A LITTLE
TOILETING: A LITTLE
STANDING UP FROM CHAIR USING ARMS: A LITTLE
MOBILITY SCORE: 18
DRESSING REGULAR LOWER BODY CLOTHING: A LITTLE
PERSONAL GROOMING: A LITTLE
EATING MEALS: A LITTLE
WALKING IN HOSPITAL ROOM: A LITTLE
TURNING FROM BACK TO SIDE WHILE IN FLAT BAD: A LITTLE

## 2023-10-30 ASSESSMENT — PAIN SCALES - GENERAL
PAINLEVEL_OUTOF10: 0 - NO PAIN

## 2023-10-30 ASSESSMENT — PAIN - FUNCTIONAL ASSESSMENT
PAIN_FUNCTIONAL_ASSESSMENT: 0-10

## 2023-10-30 NOTE — DISCHARGE SUMMARY
Discharge Diagnosis  Cellulitis of leg, right    Issues Requiring Follow-Up  Wound care center, infectious disease    Discharge Meds     Your medication list        ASK your doctor about these medications        Instructions Last Dose Given Next Dose Due   acetaminophen 325 mg tablet  Commonly known as: Tylenol           amitriptyline 25 mg tablet  Commonly known as: Elavil      Take 1 tablet (25 mg) by mouth once daily at bedtime.       apixaban 5 mg tablet  Commonly known as: Eliquis      Take 1 tablet (5 mg) by mouth 2 times a day for 7 days.       apixaban 2.5 mg tablet  Commonly known as: Eliquis      Take 1 tablet (2.5 mg) by mouth 2 times a day.       aspirin 81 mg EC tablet           cefTAZidime 2 gram  Commonly known as: Fortaz           cholecalciferol 50 mcg (2,000 unit) capsule  Commonly known as: Vitamin D-3           ciprofloxacin-dexamethasone otic suspension  Commonly known as: CiproDEX           DAPTOmycin 500 mg injection  Commonly known as: Cubicin           doxycycline 100 mg tablet  Commonly known as: Adoxa           fexofenadine 180 mg tablet  Commonly known as: Allegra           leflunomide 20 mg tablet  Commonly known as: Arava           lisinopril 5 mg tablet           metoprolol tartrate 50 mg tablet  Commonly known as: Lopressor           NIFEdipine ER 90 mg 24 hr tablet  Commonly known as: Adalat CC      Take 1 tablet (90 mg) by mouth in the morning and 1 tablet (90 mg) before bedtime.       omega-3 fatty acids-fish oil 300-1,000 mg capsule           omeprazole 20 mg DR capsule  Commonly known as: PriLOSEC           polyethylene glycol-electrolytes 420 gram solution  Commonly known as: Nulytely           predniSONE 20 mg tablet  Commonly known as: Deltasone           Repatha SureClick 140 mg/mL injection  Generic drug: evolocumab                    Test Results Pending At Discharge  Pending Labs       Order Current Status    Blood Culture Preliminary result    Blood Culture Preliminary  result            Hospital Course   Estephania Hernandez is a 63 y.o. female presenting with concern for worsening right lower extremity pain and cellulitis with a well-documented history of right lower extremity wounds that have been refractory to therapy.  Patient is noted to have chronic osteomyelitis of the right foot.  He was at the recommendation of her wound care team that she sought evaluation in the emergency department for symptoms which she states included increased pressure and tightness bilaterally but right greater than sign left, intermittent paresthesias, as well as intermittent sharp, stabbing sensations.  Patient endorses worsening symptoms with exertion.  She states her greatest concern was that the symptoms represented new blood clots as she had previously been diagnosed with lower extremity DVT and then subsequently with subsegmental PE.  She is compliant with her 2.5 mg of Eliquis twice daily.  Upon arrival to this facility, patient noted to have an elevated WBC of 18.8.  She was tachycardic and tachypneic.  Right lower extremity cellulitis was confirmed.  Patient admitted for further evaluation.     Hospital course:   Over the course of hospitalization, patient was seen by infectious disease Dr. Busby who has been following the patient as outpatient.  Patient was continued on Vanco Zosyn with significant improvement of her right lower extremity cellulitis over the course of 48 hours.  Vascular surgery was consulted due to concern for recurrent cellulitis/wound, DENNIS/PVR study reviewed, no evidence of arterial disease, no vascular intervention needed.  Patient was reevaluated by ID, in the setting of improvement of her cellulitis, recommend discharge on Augmentin for 10 days and follow-up closely as outpatient with wound care center.    I have spent > 30min discharging the pt, care time spent include discharge planning, medication reconciliation and discussion of discharge instruction/follow up with  the patient.     Pertinent Physical Exam At Time of Discharge  Constitutional:       Appearance: Normal appearance.   HENT:      Head: Normocephalic and atraumatic.   Eyes:      Pupils: Pupils are equal, round, and reactive to light.   Cardiovascular:      Rate and Rhythm: Normal rate and regular rhythm.   Pulmonary:      Effort: Pulmonary effort is normal.      Breath sounds: Normal breath sounds.   Abdominal:      General: Abdomen is flat. Bowel sounds are normal.      Palpations: Abdomen is soft.   Musculoskeletal:         General: Swelling and tenderness present. Normal range of motion.      Cervical back: Neck supple.      Right lower leg: Edema present.      Comments: Wound vac on the medial R-lower leg   Skin:     General: Skin is warm.      Findings: Erythema (in the RLE) present, improving.   Neurological:      General: No focal deficit present.      Mental Status: She is alert and oriented to person, place, and time.   Psychiatric:         Mood and Affect: Mood normal.         Behavior: Behavior normal.     Outpatient Follow-Up  Future Appointments   Date Time Provider Department [place]   11/2/2023  8:40 AM Palmer Espitia MD [id number] [place]   2/14/2024  9:00 AM Jeremiah Ceron MD [id number] [place]         Syed Ziegler MD

## 2023-10-30 NOTE — CONSULTS
Wound Care Consult     Visit Date: 10/30/2023      Patient Name: Estephania Hernandez         MRN: [id number]           YOB: 1960     Reason for Consult: Wound VAC        Wound History: Chronic Wound     Pertinent Labs:   Albumin   Date Value Ref Range Status   10/28/2023 3.4 3.4 - 5.0 g/dL Final       Wound Assessment:  Wound 10/27/23 Other (comment) Foot Right (Active)   Site Assessment Clean;Dry;Intact;Pink 10/30/23 1424   Shelbi-Wound Assessment Clean;Dry;Intact;Pink 10/30/23 1424   Drainage Description None 10/30/23 1424   Drainage Amount None 10/30/23 1424       Wound 10/27/23 Pretibial Distal;Right;Lower (Active)   Wound Length (cm) 5 cm 10/30/23 1424   Wound Width (cm) 0.5 cm 10/30/23 1424   Wound Surface Area (cm^2) 2.5 cm^2 10/30/23 1424   Wound Depth (cm) 0.5 cm 10/30/23 1424   Wound Volume (cm^3) 1.25 cm^3 10/30/23 1424   Margins Well-defined edges 10/30/23 1424       Wound Team Summary Assessment: Wound VAC dressing removed, wound and shelbi wound tissue cleanse with NS, pat dry. Wound bed with red granulation tissue, wound edges well-defined and slightly macerated. Surrounding tissue intact. Skin prep to shelbi wound tissue, drape to wound edges, one piece of black foam to wound bed and one piece of black foam on top for trac pad, all secure with drape. Trac pad placed and connected to home vac as patient is being discharge today. Patient tolerated dressing change well. Good seal obtained.      Wound Team Plan: Patient discharged and to follow-up at wound care center.      Glo Thacker RN  10/30/2023  2:26 PM

## 2023-10-30 NOTE — PROGRESS NOTES
Met with patient and pt's family member at bedside. Patient getting ready to d/c home. Pt states she is active with [facility] for a nurse that comes to her home 3x per week and also states that she goes to the outpatient wound clinic. Message to MD to place [facility] referral.     Inge Chino RN

## 2023-10-30 NOTE — PROGRESS NOTES
"INPATIENT PROGRESS NOTES    PATIENT NAME: Estephania Hernandez    MRN: [id number]  SERVICE DATE:  10/30/2023   SERVICE TIME:  12:48 PM    SIGNATURE: Cyndie Busby MD      SUBJECTIVE  Afebrile  No events overnight   Right lower extremity swelling and erythema improved significantly    OBJECTIVE  PHYSICAL EXAM:   Patient Vitals for the past 24 hrs:   BP Temp Temp src Pulse Resp SpO2   10/30/23 1200 132/81 36 °C (96.8 °F) -- 69 18 100 %   10/30/23 0800 (!) 141/93 36.6 °C (97.9 °F) -- 85 18 98 %   10/30/23 0400 126/83 36.5 °C (97.7 °F) Temporal 75 18 96 %   10/30/23 0000 139/90 36.3 °C (97.3 °F) Temporal 64 18 99 %   10/29/23 2000 130/84 36.5 °C (97.7 °F) Temporal 96 18 96 %   10/29/23 1600 127/78 36.9 °C (98.4 °F) -- 76 18 98 %         Gen: NAD  Neck: symmetric, no mass  Cardiovascular: RRR  Respiratory: No distress   Extremities: Right lower extremity edema improved, erythema resolving, wound VAC in place  Skin: Warm and dry.  Neuro: alert and oriented times 3    Labs:  Lab Results   Component Value Date    WBC 11.6 (H) 10/30/2023    HGB 11.6 (L) 10/30/2023    HCT 36.5 10/30/2023    MCV 94 10/30/2023     10/30/2023     Lab Results   Component Value Date    GLUCOSE 93 10/30/2023    CALCIUM 9.0 10/30/2023     10/30/2023    K 3.7 10/30/2023    CO2 25 10/30/2023     10/30/2023    BUN 36 (H) 10/30/2023    CREATININE 2.37 (H) 10/30/2023   ESR: --No results found for: \"SEDRATE\"  Lab Results   Component Value Date    CRP 0.44 08/16/2023     Lab Results   Component Value Date    ALT 15 10/28/2023    AST 12 10/28/2023    ALKPHOS 70 10/28/2023    BILITOT 0.5 10/28/2023         DATA:   Diagnostic tests reviewed for today's visit:    Labs this admission reviewed  Imagings this admission reviewed  Cultures: Reviewed            ASSESSMENT and PLAN:  63-year-old female with a chronic nonhealing right lower extremity wound with wound VAC in place, now admitted with right lower extremity cellulitis with leukocytosis.  WBC " is trending down.  MRSA screen is negative.  Blood cultures no growth.  Clinically with significant improvement..  The patient mentioned having photosensitivity on the chronic suppression with doxycycline    -The patient can be discharged on Augmentin renally dosed for 10 to 14 days  -Requesting wound VAC to be changed before discharge  -Discontinue chronic suppression with doxycycline  -Follow-up outpatient at wound care center    The plan was discussed with Dr. Toney Busby MD.   Infectious Diseases Attending

## 2023-10-30 NOTE — NURSING NOTE
10:20 notified Dr Ziegler pt refusing to allow staff to check BS as she says that she is not diabetic. Verbal order to discontinue POC BS.    10:50 notified Dr Ziegler that pt is not happy d/t not being seen by a Dr this morning and concerned that she will miss the hyperbaric chamber again.    11:05 Dr Ziegler at bedside.

## 2023-10-30 NOTE — PROGRESS NOTES
Vancomycin Dosing by Pharmacy- Cessation of Therapy    Consult to pharmacy for vancomycin dosing has been discontinued by the prescriber, pharmacy will sign off at this time.    Please call pharmacy if there are further questions or re-enter a consult if vancomycin is resumed.     Iaris Astorga, LorettaD

## 2023-10-31 ENCOUNTER — OFFICE VISIT (OUTPATIENT)
Dept: WOUND CARE | Facility: CLINIC | Age: 63
End: 2023-10-31
Payer: MEDICAID

## 2023-10-31 ENCOUNTER — PATIENT OUTREACH (OUTPATIENT)
Dept: PRIMARY CARE | Facility: CLINIC | Age: 63
End: 2023-10-31
Payer: MEDICAID

## 2023-10-31 PROCEDURE — G0277 HBOT, FULL BODY CHAMBER, 30M: HCPCS

## 2023-10-31 NOTE — PROGRESS NOTES
Discharge Facility: [facility]  Discharge Diagnosis: bacterial skin infection of leg  Admission Date: 10/28/2023  Discharge Date: 10/30/2023    PCP Appointment Date: none  Specialist Appointment Date:  wound clinic 11/1/2023  Cardiology 11/2/2023  Hospital Encounter and Summary: Linked   See discharge assessment below for further details    Engagement  Call Start Time: 1049 (10/31/2023 10:49 AM)    Medications  Medications reviewed with patient/caregiver?: Yes (10/31/2023 10:49 AM)  Is the patient having any side effects they believe may be caused by any medication additions or changes?: No (10/31/2023 10:49 AM)  Does the patient have all medications ordered at discharge?: Yes (10/31/2023 10:49 AM)  Care Management Interventions: No intervention needed (10/31/2023 10:49 AM)  Prescription Comments: new: augmentin. change : eliquis. stop: doxycycline (10/31/2023 10:49 AM)  Is the patient taking all medications as directed (includes completed medication regime)?: Yes (10/31/2023 10:49 AM)  Medication Comments: see med lsit (10/31/2023 10:49 AM)    Appointments  Does the patient have a primary care provider?: Yes (10/31/2023 10:49 AM)  Care Management Interventions: Advised patient to make appointment (10/31/2023 10:49 AM)  Has the patient kept scheduled appointments due by today?: Yes (10/31/2023 10:49 AM)    Self Management  What is the home health agency?: none- going to the wound clinic outpatient (10/31/2023 10:49 AM)  Has home health visited the patient within 72 hours of discharge?: Not applicable (10/31/2023 10:49 AM)    Patient Teaching  Does the patient have access to their discharge instructions?: Yes (10/31/2023 10:49 AM)  Care Management Interventions: Reviewed instructions with patient (10/31/2023 10:49 AM)  What is the patient's perception of their health status since discharge?: Improving (10/31/2023 10:49 AM)  Is the patient/caregiver able to teach back the hierarchy of who to call/visit for  symptoms/problems? PCP, Specialist, Home Health nurse, Urgent Care, ED, 911: Yes (10/31/2023 10:49 AM)    Wrap Up  Wrap Up Additional Comments: CTS spoke with patient. she stated that she was doing fine. she denies any questions or concerns at time. patient hung up before I could suggest making an appt with pcp. (10/31/2023 10:49 AM)  Call End Time: 1052 (10/31/2023 10:49 AM)

## 2023-11-01 ENCOUNTER — OFFICE VISIT (OUTPATIENT)
Dept: WOUND CARE | Facility: CLINIC | Age: 63
End: 2023-11-01
Payer: MEDICAID

## 2023-11-01 ENCOUNTER — LAB (OUTPATIENT)
Dept: LAB | Facility: LAB | Age: 63
End: 2023-11-01
Payer: MEDICAID

## 2023-11-01 ENCOUNTER — TELEPHONE (OUTPATIENT)
Dept: INTERNAL MEDICINE | Facility: HOSPITAL | Age: 63
End: 2023-11-01

## 2023-11-01 ENCOUNTER — HOME CARE VISIT (OUTPATIENT)
Dept: HOME HEALTH SERVICES | Facility: HOME HEALTH | Age: 63
End: 2023-11-01
Payer: MEDICAID

## 2023-11-01 VITALS
DIASTOLIC BLOOD PRESSURE: 88 MMHG | HEART RATE: 96 BPM | TEMPERATURE: 98.2 F | SYSTOLIC BLOOD PRESSURE: 140 MMHG | RESPIRATION RATE: 18 BRPM

## 2023-11-01 DIAGNOSIS — L97.811 NON-PRESSURE CHRONIC ULCER OF OTHER PART OF RIGHT LOWER LEG LIMITED TO BREAKDOWN OF SKIN (MULTI): ICD-10-CM

## 2023-11-01 DIAGNOSIS — T81.31XD DISRUPTION OF EXTERNAL OPERATION (SURGICAL) WOUND, NOT ELSEWHERE CLASSIFIED, SUBSEQUENT ENCOUNTER: ICD-10-CM

## 2023-11-01 DIAGNOSIS — M86.661: Primary | ICD-10-CM

## 2023-11-01 DIAGNOSIS — L97.316: ICD-10-CM

## 2023-11-01 LAB
BACTERIA BLD CULT: NORMAL
BACTERIA BLD CULT: NORMAL
CRP SERPL-MCNC: 2.32 MG/DL
ERYTHROCYTE [SEDIMENTATION RATE] IN BLOOD BY WESTERGREN METHOD: 15 MM/H (ref 0–30)

## 2023-11-01 PROCEDURE — 0023 HH SOC

## 2023-11-01 PROCEDURE — G0277 HBOT, FULL BODY CHAMBER, 30M: HCPCS

## 2023-11-01 PROCEDURE — 11042 DBRDMT SUBQ TIS 1ST 20SQCM/<: CPT

## 2023-11-01 PROCEDURE — 86140 C-REACTIVE PROTEIN: CPT

## 2023-11-01 PROCEDURE — G0299 HHS/HOSPICE OF RN EA 15 MIN: HCPCS

## 2023-11-01 PROCEDURE — 36415 COLL VENOUS BLD VENIPUNCTURE: CPT

## 2023-11-01 PROCEDURE — 85652 RBC SED RATE AUTOMATED: CPT

## 2023-11-02 ENCOUNTER — APPOINTMENT (OUTPATIENT)
Dept: CARDIOLOGY | Facility: CLINIC | Age: 63
End: 2023-11-02
Payer: MEDICAID

## 2023-11-02 ENCOUNTER — OFFICE VISIT (OUTPATIENT)
Dept: WOUND CARE | Facility: CLINIC | Age: 63
End: 2023-11-02
Payer: MEDICAID

## 2023-11-02 ENCOUNTER — HOME CARE VISIT (OUTPATIENT)
Dept: HOME HEALTH SERVICES | Facility: HOME HEALTH | Age: 63
End: 2023-11-02
Payer: MEDICAID

## 2023-11-02 ENCOUNTER — HOSPITAL ENCOUNTER (OUTPATIENT)
Dept: CARDIOLOGY | Facility: HOSPITAL | Age: 63
Discharge: HOME | End: 2023-11-02
Payer: MEDICAID

## 2023-11-02 LAB
ATRIAL RATE: 105 BPM
P AXIS: 53 DEGREES
P OFFSET: 197 MS
P ONSET: 152 MS
PR INTERVAL: 134 MS
Q ONSET: 219 MS
QRS COUNT: 17 BEATS
QRS DURATION: 74 MS
QT INTERVAL: 340 MS
QTC CALCULATION(BAZETT): 449 MS
QTC FREDERICIA: 409 MS
R AXIS: 36 DEGREES
T AXIS: 56 DEGREES
T OFFSET: 389 MS
VENTRICULAR RATE: 105 BPM

## 2023-11-02 PROCEDURE — G0277 HBOT, FULL BODY CHAMBER, 30M: HCPCS

## 2023-11-02 PROCEDURE — 99183 HYPERBARIC OXYGEN THERAPY: CPT | Performed by: PLASTIC SURGERY

## 2023-11-02 PROCEDURE — 93005 ELECTROCARDIOGRAM TRACING: CPT

## 2023-11-02 ASSESSMENT — ENCOUNTER SYMPTOMS
DENIES PAIN: 1
PERSON REPORTING PAIN: PATIENT
LOWER EXTREMITY EDEMA: 1
APPETITE LEVEL: GOOD

## 2023-11-02 ASSESSMENT — ACTIVITIES OF DAILY LIVING (ADL)
ENTERING_EXITING_HOME: ONE PERSON
OASIS_M1830: 03

## 2023-11-03 ENCOUNTER — OFFICE VISIT (OUTPATIENT)
Dept: WOUND CARE | Facility: CLINIC | Age: 63
End: 2023-11-03
Payer: MEDICAID

## 2023-11-03 ENCOUNTER — HOME CARE VISIT (OUTPATIENT)
Dept: HOME HEALTH SERVICES | Facility: HOME HEALTH | Age: 63
End: 2023-11-03
Payer: MEDICAID

## 2023-11-03 PROCEDURE — G0277 HBOT, FULL BODY CHAMBER, 30M: HCPCS

## 2023-11-03 PROCEDURE — 99183 HYPERBARIC OXYGEN THERAPY: CPT | Performed by: SURGERY

## 2023-11-03 PROCEDURE — G0299 HHS/HOSPICE OF RN EA 15 MIN: HCPCS

## 2023-11-04 ENCOUNTER — APPOINTMENT (OUTPATIENT)
Dept: HOME HEALTH SERVICES | Facility: HOME HEALTH | Age: 63
End: 2023-11-04
Payer: MEDICAID

## 2023-11-04 VITALS
DIASTOLIC BLOOD PRESSURE: 60 MMHG | HEART RATE: 70 BPM | TEMPERATURE: 97.9 F | SYSTOLIC BLOOD PRESSURE: 122 MMHG | RESPIRATION RATE: 20 BRPM

## 2023-11-04 SDOH — ECONOMIC STABILITY: FOOD INSECURITY: MEALS PER DAY: 3

## 2023-11-04 SDOH — ECONOMIC STABILITY: GENERAL

## 2023-11-04 ASSESSMENT — ENCOUNTER SYMPTOMS
OCCASIONAL FEELINGS OF UNSTEADINESS: 1
SKIN LESIONS: 1
DEPRESSION: 1
DENIES PAIN: 1
CHANGE IN APPETITE: UNCHANGED
MUSCLE WEAKNESS: 1
APPETITE LEVEL: GOOD
LOSS OF SENSATION IN FEET: 0

## 2023-11-04 ASSESSMENT — ACTIVITIES OF DAILY LIVING (ADL)
CURRENT_FUNCTION: ONE PERSON
SHOPPING ASSESSED: 1
PHYSICAL TRANSFERS ASSESSED: 1
AMBULATION ASSISTANCE: ONE PERSON
AMBULATION ASSISTANCE: 1
MONEY MANAGEMENT (EXPENSES/BILLS): INDEPENDENT
SHOPPING: DEPENDENT

## 2023-11-04 ASSESSMENT — PAIN SCALES - PAIN ASSESSMENT IN ADVANCED DEMENTIA (PAINAD)
FACIALEXPRESSION: 0
NEGVOCALIZATION: 0 - NONE.
BODYLANGUAGE: 0 - RELAXED.
BODYLANGUAGE: 0
TOTALSCORE: 0
CONSOLABILITY: 0 - NO NEED TO CONSOLE.
NEGVOCALIZATION: 0
BREATHING: 0
FACIALEXPRESSION: 0 - SMILING OR INEXPRESSIVE.
CONSOLABILITY: 0

## 2023-11-06 ENCOUNTER — OFFICE VISIT (OUTPATIENT)
Dept: WOUND CARE | Facility: CLINIC | Age: 63
End: 2023-11-06
Payer: MEDICAID

## 2023-11-06 ENCOUNTER — HOME CARE VISIT (OUTPATIENT)
Dept: HOME HEALTH SERVICES | Facility: HOME HEALTH | Age: 63
End: 2023-11-06
Payer: MEDICAID

## 2023-11-06 VITALS
SYSTOLIC BLOOD PRESSURE: 136 MMHG | RESPIRATION RATE: 18 BRPM | TEMPERATURE: 97.2 F | DIASTOLIC BLOOD PRESSURE: 68 MMHG | HEART RATE: 100 BPM

## 2023-11-06 PROCEDURE — 99183 HYPERBARIC OXYGEN THERAPY: CPT | Performed by: PLASTIC SURGERY

## 2023-11-06 PROCEDURE — G0299 HHS/HOSPICE OF RN EA 15 MIN: HCPCS

## 2023-11-06 PROCEDURE — G0277 HBOT, FULL BODY CHAMBER, 30M: HCPCS

## 2023-11-06 ASSESSMENT — ENCOUNTER SYMPTOMS
LOWER EXTREMITY EDEMA: 1
DENIES PAIN: 1
APPETITE LEVEL: GOOD
PERSON REPORTING PAIN: PATIENT

## 2023-11-07 ENCOUNTER — OFFICE VISIT (OUTPATIENT)
Dept: WOUND CARE | Facility: CLINIC | Age: 63
End: 2023-11-07
Payer: MEDICAID

## 2023-11-07 PROCEDURE — 11044 DBRDMT BONE 1ST 20 SQ CM/<: CPT

## 2023-11-07 PROCEDURE — G0277 HBOT, FULL BODY CHAMBER, 30M: HCPCS

## 2023-11-08 ENCOUNTER — OFFICE VISIT (OUTPATIENT)
Dept: WOUND CARE | Facility: CLINIC | Age: 63
End: 2023-11-08
Payer: MEDICAID

## 2023-11-08 ENCOUNTER — HOME CARE VISIT (OUTPATIENT)
Dept: HOME HEALTH SERVICES | Facility: HOME HEALTH | Age: 63
End: 2023-11-08
Payer: MEDICAID

## 2023-11-08 VITALS
HEART RATE: 78 BPM | SYSTOLIC BLOOD PRESSURE: 112 MMHG | DIASTOLIC BLOOD PRESSURE: 70 MMHG | TEMPERATURE: 97.5 F | OXYGEN SATURATION: 98 % | RESPIRATION RATE: 18 BRPM

## 2023-11-08 PROCEDURE — G0300 HHS/HOSPICE OF LPN EA 15 MIN: HCPCS

## 2023-11-08 PROCEDURE — G0277 HBOT, FULL BODY CHAMBER, 30M: HCPCS

## 2023-11-08 ASSESSMENT — ENCOUNTER SYMPTOMS
PERSON REPORTING PAIN: PATIENT
CHANGE IN APPETITE: UNCHANGED
DENIES PAIN: 1
APPETITE LEVEL: GOOD

## 2023-11-08 ASSESSMENT — PAIN SCALES - PAIN ASSESSMENT IN ADVANCED DEMENTIA (PAINAD): BREATHING: 0

## 2023-11-09 ENCOUNTER — OFFICE VISIT (OUTPATIENT)
Dept: WOUND CARE | Facility: CLINIC | Age: 63
End: 2023-11-09
Payer: MEDICAID

## 2023-11-09 PROCEDURE — G0277 HBOT, FULL BODY CHAMBER, 30M: HCPCS

## 2023-11-09 PROCEDURE — 99183 HYPERBARIC OXYGEN THERAPY: CPT | Performed by: PLASTIC SURGERY

## 2023-11-10 ENCOUNTER — HOME CARE VISIT (OUTPATIENT)
Dept: HOME HEALTH SERVICES | Facility: HOME HEALTH | Age: 63
End: 2023-11-10
Payer: MEDICAID

## 2023-11-10 ENCOUNTER — OFFICE VISIT (OUTPATIENT)
Dept: WOUND CARE | Facility: CLINIC | Age: 63
End: 2023-11-10
Payer: MEDICAID

## 2023-11-10 PROCEDURE — 99183 HYPERBARIC OXYGEN THERAPY: CPT | Performed by: SURGERY

## 2023-11-10 PROCEDURE — 3078F DIAST BP <80 MM HG: CPT | Performed by: SURGERY

## 2023-11-10 PROCEDURE — G0300 HHS/HOSPICE OF LPN EA 15 MIN: HCPCS

## 2023-11-10 PROCEDURE — G0277 HBOT, FULL BODY CHAMBER, 30M: HCPCS

## 2023-11-10 PROCEDURE — 1036F TOBACCO NON-USER: CPT | Performed by: SURGERY

## 2023-11-10 PROCEDURE — 3074F SYST BP LT 130 MM HG: CPT | Performed by: SURGERY

## 2023-11-10 SDOH — ECONOMIC STABILITY: GENERAL

## 2023-11-10 ASSESSMENT — ENCOUNTER SYMPTOMS
DENIES PAIN: 1
PERSON REPORTING PAIN: PATIENT
APPETITE LEVEL: GOOD
CHANGE IN APPETITE: UNCHANGED

## 2023-11-10 ASSESSMENT — ACTIVITIES OF DAILY LIVING (ADL)
DRESSING_LB_CURRENT_FUNCTION: INDEPENDENT
BATHING ASSESSED: 1
TOILETING: INDEPENDENT
DRESSING_UB_CURRENT_FUNCTION: INDEPENDENT
BATHING_CURRENT_FUNCTION: INDEPENDENT
MONEY MANAGEMENT (EXPENSES/BILLS): INDEPENDENT
TOILETING: 1

## 2023-11-13 ENCOUNTER — HOME CARE VISIT (OUTPATIENT)
Dept: HOME HEALTH SERVICES | Facility: HOME HEALTH | Age: 63
End: 2023-11-13
Payer: MEDICAID

## 2023-11-13 ENCOUNTER — PATIENT OUTREACH (OUTPATIENT)
Dept: PRIMARY CARE | Facility: CLINIC | Age: 63
End: 2023-11-13
Payer: MEDICAID

## 2023-11-13 ENCOUNTER — OFFICE VISIT (OUTPATIENT)
Dept: WOUND CARE | Facility: CLINIC | Age: 63
End: 2023-11-13
Payer: MEDICAID

## 2023-11-13 VITALS — RESPIRATION RATE: 18 BRPM | TEMPERATURE: 98.1 F | HEART RATE: 98 BPM

## 2023-11-13 PROCEDURE — G0299 HHS/HOSPICE OF RN EA 15 MIN: HCPCS

## 2023-11-13 PROCEDURE — G0277 HBOT, FULL BODY CHAMBER, 30M: HCPCS

## 2023-11-13 PROCEDURE — 99183 HYPERBARIC OXYGEN THERAPY: CPT | Performed by: PLASTIC SURGERY

## 2023-11-13 PROCEDURE — 1036F TOBACCO NON-USER: CPT | Performed by: PLASTIC SURGERY

## 2023-11-13 ASSESSMENT — ENCOUNTER SYMPTOMS
LOWER EXTREMITY EDEMA: 1
DENIES PAIN: 1
APPETITE LEVEL: GOOD
PERSON REPORTING PAIN: PATIENT

## 2023-11-14 ENCOUNTER — OFFICE VISIT (OUTPATIENT)
Dept: WOUND CARE | Facility: CLINIC | Age: 63
End: 2023-11-14
Payer: MEDICAID

## 2023-11-14 PROCEDURE — 97605 NEG PRS WND THER DME<=50SQCM: CPT

## 2023-11-14 PROCEDURE — G0277 HBOT, FULL BODY CHAMBER, 30M: HCPCS

## 2023-11-14 PROCEDURE — 11044 DBRDMT BONE 1ST 20 SQ CM/<: CPT

## 2023-11-15 ENCOUNTER — OFFICE VISIT (OUTPATIENT)
Dept: WOUND CARE | Facility: CLINIC | Age: 63
End: 2023-11-15
Payer: MEDICAID

## 2023-11-15 PROCEDURE — G0277 HBOT, FULL BODY CHAMBER, 30M: HCPCS

## 2023-11-16 ENCOUNTER — OFFICE VISIT (OUTPATIENT)
Dept: WOUND CARE | Facility: CLINIC | Age: 63
End: 2023-11-16
Payer: MEDICAID

## 2023-11-16 PROCEDURE — G0277 HBOT, FULL BODY CHAMBER, 30M: HCPCS

## 2023-11-16 PROCEDURE — 99183 HYPERBARIC OXYGEN THERAPY: CPT | Performed by: PLASTIC SURGERY

## 2023-11-17 ENCOUNTER — OFFICE VISIT (OUTPATIENT)
Dept: WOUND CARE | Facility: CLINIC | Age: 63
End: 2023-11-17
Payer: MEDICAID

## 2023-11-17 ENCOUNTER — HOME CARE VISIT (OUTPATIENT)
Dept: HOME HEALTH SERVICES | Facility: HOME HEALTH | Age: 63
End: 2023-11-17
Payer: MEDICAID

## 2023-11-17 VITALS
HEART RATE: 76 BPM | DIASTOLIC BLOOD PRESSURE: 72 MMHG | SYSTOLIC BLOOD PRESSURE: 128 MMHG | RESPIRATION RATE: 18 BRPM | OXYGEN SATURATION: 98 % | TEMPERATURE: 97.8 F

## 2023-11-17 PROCEDURE — G0300 HHS/HOSPICE OF LPN EA 15 MIN: HCPCS

## 2023-11-17 PROCEDURE — G0277 HBOT, FULL BODY CHAMBER, 30M: HCPCS

## 2023-11-17 PROCEDURE — G0180 MD CERTIFICATION HHA PATIENT: HCPCS | Performed by: FAMILY MEDICINE

## 2023-11-17 PROCEDURE — 99183 HYPERBARIC OXYGEN THERAPY: CPT | Performed by: SURGERY

## 2023-11-17 SDOH — ECONOMIC STABILITY: GENERAL

## 2023-11-17 ASSESSMENT — ENCOUNTER SYMPTOMS
LOWER EXTREMITY EDEMA: 1
DENIES PAIN: 1
APPETITE LEVEL: GOOD
MUSCLE WEAKNESS: 1
LIMITED RANGE OF MOTION: 1
CHANGE IN APPETITE: UNCHANGED
PERSON REPORTING PAIN: PATIENT

## 2023-11-17 ASSESSMENT — ACTIVITIES OF DAILY LIVING (ADL)
AMBULATION ASSISTANCE: NON-AMBULATORY
MONEY MANAGEMENT (EXPENSES/BILLS): INDEPENDENT
TOILETING: INDEPENDENT
DRESSING_LB_CURRENT_FUNCTION: INDEPENDENT
AMBULATION ASSISTANCE: 1
BATHING_CURRENT_FUNCTION: INDEPENDENT
TOILETING: 1
BATHING ASSESSED: 1
DRESSING_UB_CURRENT_FUNCTION: INDEPENDENT

## 2023-11-20 ENCOUNTER — OFFICE VISIT (OUTPATIENT)
Dept: WOUND CARE | Facility: CLINIC | Age: 63
End: 2023-11-20
Payer: MEDICAID

## 2023-11-20 ENCOUNTER — HOME CARE VISIT (OUTPATIENT)
Dept: HOME HEALTH SERVICES | Facility: HOME HEALTH | Age: 63
End: 2023-11-20
Payer: MEDICAID

## 2023-11-20 VITALS
RESPIRATION RATE: 18 BRPM | DIASTOLIC BLOOD PRESSURE: 76 MMHG | HEART RATE: 77 BPM | TEMPERATURE: 98.1 F | SYSTOLIC BLOOD PRESSURE: 130 MMHG

## 2023-11-20 PROCEDURE — G0277 HBOT, FULL BODY CHAMBER, 30M: HCPCS

## 2023-11-20 PROCEDURE — 99183 HYPERBARIC OXYGEN THERAPY: CPT | Performed by: PLASTIC SURGERY

## 2023-11-20 PROCEDURE — G0299 HHS/HOSPICE OF RN EA 15 MIN: HCPCS

## 2023-11-21 ENCOUNTER — OFFICE VISIT (OUTPATIENT)
Dept: WOUND CARE | Facility: CLINIC | Age: 63
End: 2023-11-21
Payer: MEDICAID

## 2023-11-21 PROCEDURE — 11044 DBRDMT BONE 1ST 20 SQ CM/<: CPT

## 2023-11-21 PROCEDURE — 97605 NEG PRS WND THER DME<=50SQCM: CPT

## 2023-11-21 PROCEDURE — G0277 HBOT, FULL BODY CHAMBER, 30M: HCPCS

## 2023-11-21 ASSESSMENT — ENCOUNTER SYMPTOMS
LOWER EXTREMITY EDEMA: 1
DENIES PAIN: 1
PERSON REPORTING PAIN: PATIENT

## 2023-11-22 ENCOUNTER — OFFICE VISIT (OUTPATIENT)
Dept: WOUND CARE | Facility: CLINIC | Age: 63
End: 2023-11-22
Payer: MEDICAID

## 2023-11-22 PROCEDURE — G0277 HBOT, FULL BODY CHAMBER, 30M: HCPCS

## 2023-11-25 ENCOUNTER — HOME CARE VISIT (OUTPATIENT)
Dept: HOME HEALTH SERVICES | Facility: HOME HEALTH | Age: 63
End: 2023-11-25
Payer: MEDICAID

## 2023-11-25 VITALS
RESPIRATION RATE: 18 BRPM | TEMPERATURE: 98.1 F | OXYGEN SATURATION: 98 % | HEART RATE: 74 BPM | SYSTOLIC BLOOD PRESSURE: 112 MMHG | DIASTOLIC BLOOD PRESSURE: 70 MMHG

## 2023-11-25 PROCEDURE — G0300 HHS/HOSPICE OF LPN EA 15 MIN: HCPCS

## 2023-11-25 ASSESSMENT — ENCOUNTER SYMPTOMS
PERSON REPORTING PAIN: PATIENT
APPETITE LEVEL: GOOD
DENIES PAIN: 1
CHANGE IN APPETITE: UNCHANGED

## 2023-11-27 ENCOUNTER — APPOINTMENT (OUTPATIENT)
Dept: WOUND CARE | Facility: CLINIC | Age: 63
End: 2023-11-27
Payer: MEDICAID

## 2023-11-27 ENCOUNTER — HOSPITAL ENCOUNTER (OUTPATIENT)
Dept: RADIOLOGY | Facility: HOSPITAL | Age: 63
Discharge: HOME | End: 2023-11-27
Payer: MEDICAID

## 2023-11-27 ENCOUNTER — OFFICE VISIT (OUTPATIENT)
Dept: WOUND CARE | Facility: CLINIC | Age: 63
End: 2023-11-27
Payer: MEDICAID

## 2023-11-27 DIAGNOSIS — M86.661: ICD-10-CM

## 2023-11-27 DIAGNOSIS — L97.316: ICD-10-CM

## 2023-11-27 DIAGNOSIS — L97.811 NON-PRESSURE CHRONIC ULCER OF OTHER PART OF RIGHT LOWER LEG LIMITED TO BREAKDOWN OF SKIN (MULTI): ICD-10-CM

## 2023-11-27 PROCEDURE — 73721 MRI JNT OF LWR EXTRE W/O DYE: CPT | Mod: RIGHT SIDE | Performed by: RADIOLOGY

## 2023-11-27 PROCEDURE — 99183 HYPERBARIC OXYGEN THERAPY: CPT | Performed by: PLASTIC SURGERY

## 2023-11-27 PROCEDURE — G0277 HBOT, FULL BODY CHAMBER, 30M: HCPCS

## 2023-11-27 PROCEDURE — 73721 MRI JNT OF LWR EXTRE W/O DYE: CPT | Mod: RT

## 2023-11-28 ENCOUNTER — OFFICE VISIT (OUTPATIENT)
Dept: WOUND CARE | Facility: CLINIC | Age: 63
End: 2023-11-28
Payer: MEDICAID

## 2023-11-28 PROCEDURE — G0277 HBOT, FULL BODY CHAMBER, 30M: HCPCS

## 2023-11-29 ENCOUNTER — OFFICE VISIT (OUTPATIENT)
Dept: WOUND CARE | Facility: CLINIC | Age: 63
End: 2023-11-29
Payer: MEDICAID

## 2023-11-29 PROCEDURE — G0277 HBOT, FULL BODY CHAMBER, 30M: HCPCS

## 2023-11-30 ENCOUNTER — OFFICE VISIT (OUTPATIENT)
Dept: WOUND CARE | Facility: CLINIC | Age: 63
End: 2023-11-30
Payer: MEDICAID

## 2023-11-30 ENCOUNTER — LAB REQUISITION (OUTPATIENT)
Dept: LAB | Facility: HOSPITAL | Age: 63
End: 2023-11-30
Payer: MEDICAID

## 2023-11-30 ENCOUNTER — HOME CARE VISIT (OUTPATIENT)
Dept: HOME HEALTH SERVICES | Facility: HOME HEALTH | Age: 63
End: 2023-11-30
Payer: MEDICAID

## 2023-11-30 DIAGNOSIS — L97.316: ICD-10-CM

## 2023-11-30 DIAGNOSIS — L97.811 NON-PRESSURE CHRONIC ULCER OF OTHER PART OF RIGHT LOWER LEG LIMITED TO BREAKDOWN OF SKIN (MULTI): ICD-10-CM

## 2023-11-30 DIAGNOSIS — M86.661: ICD-10-CM

## 2023-11-30 DIAGNOSIS — T81.31XD DISRUPTION OF EXTERNAL OPERATION (SURGICAL) WOUND, NOT ELSEWHERE CLASSIFIED, SUBSEQUENT ENCOUNTER: ICD-10-CM

## 2023-11-30 PROCEDURE — 87181 SC STD AGAR DILUTION PER AGT: CPT | Mod: OUT,ELYLAB | Performed by: PLASTIC SURGERY

## 2023-11-30 PROCEDURE — 87186 SC STD MICRODIL/AGAR DIL: CPT | Mod: OUT,ELYLAB | Performed by: PLASTIC SURGERY

## 2023-11-30 PROCEDURE — 99183 HYPERBARIC OXYGEN THERAPY: CPT | Performed by: PLASTIC SURGERY

## 2023-11-30 PROCEDURE — 1036F TOBACCO NON-USER: CPT | Performed by: PLASTIC SURGERY

## 2023-11-30 PROCEDURE — 97605 NEG PRS WND THER DME<=50SQCM: CPT | Performed by: PLASTIC SURGERY

## 2023-11-30 PROCEDURE — 97605 NEG PRS WND THER DME<=50SQCM: CPT

## 2023-11-30 PROCEDURE — 11043 DBRDMT MUSC&/FSCA 1ST 20/<: CPT

## 2023-11-30 PROCEDURE — G0277 HBOT, FULL BODY CHAMBER, 30M: HCPCS

## 2023-11-30 PROCEDURE — 11043 DBRDMT MUSC&/FSCA 1ST 20/<: CPT | Performed by: PLASTIC SURGERY

## 2023-12-01 ENCOUNTER — OFFICE VISIT (OUTPATIENT)
Dept: WOUND CARE | Facility: CLINIC | Age: 63
End: 2023-12-01
Payer: MEDICAID

## 2023-12-01 PROCEDURE — 99183 HYPERBARIC OXYGEN THERAPY: CPT | Performed by: SURGERY

## 2023-12-01 PROCEDURE — G0277 HBOT, FULL BODY CHAMBER, 30M: HCPCS

## 2023-12-02 ENCOUNTER — HOME CARE VISIT (OUTPATIENT)
Dept: HOME HEALTH SERVICES | Facility: HOME HEALTH | Age: 63
End: 2023-12-02
Payer: MEDICAID

## 2023-12-02 VITALS
HEART RATE: 76 BPM | DIASTOLIC BLOOD PRESSURE: 70 MMHG | OXYGEN SATURATION: 97 % | SYSTOLIC BLOOD PRESSURE: 122 MMHG | RESPIRATION RATE: 18 BRPM

## 2023-12-02 PROCEDURE — 0023 HH SOC

## 2023-12-02 PROCEDURE — G0300 HHS/HOSPICE OF LPN EA 15 MIN: HCPCS

## 2023-12-02 SDOH — ECONOMIC STABILITY: GENERAL

## 2023-12-02 ASSESSMENT — ENCOUNTER SYMPTOMS
MUSCLE WEAKNESS: 1
OCCASIONAL FEELINGS OF UNSTEADINESS: 0
PERSON REPORTING PAIN: PATIENT
DENIES PAIN: 1
CHANGE IN APPETITE: UNCHANGED
LIMITED RANGE OF MOTION: 1
APPETITE LEVEL: GOOD

## 2023-12-02 ASSESSMENT — ACTIVITIES OF DAILY LIVING (ADL)
TOILETING: INDEPENDENT
MONEY MANAGEMENT (EXPENSES/BILLS): INDEPENDENT
DRESSING_LB_CURRENT_FUNCTION: INDEPENDENT
AMBULATION ASSISTANCE: INDEPENDENT
AMBULATION ASSISTANCE: 1
DRESSING_UB_CURRENT_FUNCTION: INDEPENDENT
FEEDING: INDEPENDENT
TOILETING: 1
BATHING ASSESSED: 1
FEEDING ASSESSED: 1
BATHING_CURRENT_FUNCTION: INDEPENDENT

## 2023-12-04 ENCOUNTER — OFFICE VISIT (OUTPATIENT)
Dept: WOUND CARE | Facility: CLINIC | Age: 63
End: 2023-12-04
Payer: MEDICAID

## 2023-12-04 LAB
BACTERIA SPEC CULT: ABNORMAL
GRAM STN SPEC: ABNORMAL
GRAM STN SPEC: ABNORMAL

## 2023-12-04 PROCEDURE — 99183 HYPERBARIC OXYGEN THERAPY: CPT | Performed by: PLASTIC SURGERY

## 2023-12-04 PROCEDURE — 11043 DBRDMT MUSC&/FSCA 1ST 20/<: CPT | Performed by: PLASTIC SURGERY

## 2023-12-04 PROCEDURE — 11043 DBRDMT MUSC&/FSCA 1ST 20/<: CPT

## 2023-12-04 PROCEDURE — G0277 HBOT, FULL BODY CHAMBER, 30M: HCPCS

## 2023-12-05 ENCOUNTER — OFFICE VISIT (OUTPATIENT)
Dept: WOUND CARE | Facility: CLINIC | Age: 63
End: 2023-12-05
Payer: MEDICAID

## 2023-12-05 ENCOUNTER — APPOINTMENT (OUTPATIENT)
Dept: WOUND CARE | Facility: CLINIC | Age: 63
End: 2023-12-05
Payer: MEDICAID

## 2023-12-05 PROCEDURE — G0277 HBOT, FULL BODY CHAMBER, 30M: HCPCS

## 2023-12-06 ENCOUNTER — OFFICE VISIT (OUTPATIENT)
Dept: WOUND CARE | Facility: CLINIC | Age: 63
End: 2023-12-06
Payer: MEDICAID

## 2023-12-06 ENCOUNTER — TELEPHONE (OUTPATIENT)
Dept: INFECTIOUS DISEASES | Age: 63
End: 2023-12-06

## 2023-12-06 PROCEDURE — G0277 HBOT, FULL BODY CHAMBER, 30M: HCPCS

## 2023-12-07 ENCOUNTER — HOME CARE VISIT (OUTPATIENT)
Dept: HOME HEALTH SERVICES | Facility: HOME HEALTH | Age: 63
End: 2023-12-07
Payer: MEDICAID

## 2023-12-07 ENCOUNTER — OFFICE VISIT (OUTPATIENT)
Dept: WOUND CARE | Facility: CLINIC | Age: 63
End: 2023-12-07
Payer: MEDICAID

## 2023-12-07 PROCEDURE — G0299 HHS/HOSPICE OF RN EA 15 MIN: HCPCS

## 2023-12-07 PROCEDURE — 99183 HYPERBARIC OXYGEN THERAPY: CPT | Performed by: PLASTIC SURGERY

## 2023-12-07 PROCEDURE — G0277 HBOT, FULL BODY CHAMBER, 30M: HCPCS

## 2023-12-07 ASSESSMENT — ENCOUNTER SYMPTOMS
MUSCLE WEAKNESS: 1
CHANGE IN APPETITE: UNCHANGED
LAST BOWEL MOVEMENT: 66814
LOWER EXTREMITY EDEMA: 1
DENIES PAIN: 1
APPETITE LEVEL: GOOD
PERSON REPORTING PAIN: PATIENT

## 2023-12-07 NOTE — HOME HEALTH
Routine nursing visit with wound care completed, pt tolerated well.  VSS, no c/o pain.  Pt has hyperbaric treatment today and is hoping after this round that she will be healed.  Next wound care visit is Sat.  No further needs at this visit.

## 2023-12-08 ENCOUNTER — OFFICE VISIT (OUTPATIENT)
Dept: WOUND CARE | Facility: CLINIC | Age: 63
End: 2023-12-08
Payer: MEDICAID

## 2023-12-08 PROCEDURE — 99183 HYPERBARIC OXYGEN THERAPY: CPT | Performed by: SURGERY

## 2023-12-08 PROCEDURE — G0277 HBOT, FULL BODY CHAMBER, 30M: HCPCS

## 2023-12-09 ENCOUNTER — HOME CARE VISIT (OUTPATIENT)
Dept: HOME HEALTH SERVICES | Facility: HOME HEALTH | Age: 63
End: 2023-12-09
Payer: MEDICAID

## 2023-12-09 VITALS
HEART RATE: 70 BPM | RESPIRATION RATE: 20 BRPM | DIASTOLIC BLOOD PRESSURE: 68 MMHG | TEMPERATURE: 97 F | SYSTOLIC BLOOD PRESSURE: 118 MMHG | OXYGEN SATURATION: 99 %

## 2023-12-09 PROCEDURE — G0299 HHS/HOSPICE OF RN EA 15 MIN: HCPCS

## 2023-12-09 ASSESSMENT — PAIN SCALES - PAIN ASSESSMENT IN ADVANCED DEMENTIA (PAINAD)
FACIALEXPRESSION: 0
FACIALEXPRESSION: 0 - SMILING OR INEXPRESSIVE.
BODYLANGUAGE: 0
CONSOLABILITY: 0 - NO NEED TO CONSOLE.
BREATHING: 0
NEGVOCALIZATION: 0
NEGVOCALIZATION: 0 - NONE.
TOTALSCORE: 0
CONSOLABILITY: 0
BODYLANGUAGE: 0 - RELAXED.

## 2023-12-09 ASSESSMENT — ENCOUNTER SYMPTOMS
PERSON REPORTING PAIN: PATIENT
SUBJECTIVE PAIN PROGRESSION: UNCHANGED
LOWEST PAIN SEVERITY IN PAST 24 HOURS: 0/10
PAIN SEVERITY GOAL: 0/10
HIGHEST PAIN SEVERITY IN PAST 24 HOURS: 0/10
LOSS OF SENSATION IN FEET: 0
OCCASIONAL FEELINGS OF UNSTEADINESS: 1
DEPRESSION: 0
CHANGE IN APPETITE: UNCHANGED
LAST BOWEL MOVEMENT: 66817
APPETITE LEVEL: GOOD
DENIES PAIN: 1

## 2023-12-10 NOTE — HOME HEALTH
SKILLED NURSING VISIT FOR ASSESSMENT AND WOUND VAC DRESSING CHANGE TO RIGHT ANKLE. PT TOLERATED PROCEDURES WELL.

## 2023-12-11 ENCOUNTER — OFFICE VISIT (OUTPATIENT)
Dept: WOUND CARE | Facility: CLINIC | Age: 63
End: 2023-12-11
Payer: MEDICAID

## 2023-12-11 PROCEDURE — 1036F TOBACCO NON-USER: CPT | Performed by: PLASTIC SURGERY

## 2023-12-11 PROCEDURE — G0277 HBOT, FULL BODY CHAMBER, 30M: HCPCS

## 2023-12-11 PROCEDURE — 97605 NEG PRS WND THER DME<=50SQCM: CPT

## 2023-12-11 PROCEDURE — 97605 NEG PRS WND THER DME<=50SQCM: CPT | Performed by: PLASTIC SURGERY

## 2023-12-11 PROCEDURE — 11043 DBRDMT MUSC&/FSCA 1ST 20/<: CPT

## 2023-12-11 PROCEDURE — 11043 DBRDMT MUSC&/FSCA 1ST 20/<: CPT | Performed by: PLASTIC SURGERY

## 2023-12-11 PROCEDURE — 99183 HYPERBARIC OXYGEN THERAPY: CPT | Performed by: PLASTIC SURGERY

## 2023-12-12 ENCOUNTER — TELEPHONE (OUTPATIENT)
Dept: INFECTIOUS DISEASES | Age: 63
End: 2023-12-12

## 2023-12-12 ENCOUNTER — OFFICE VISIT (OUTPATIENT)
Dept: WOUND CARE | Facility: CLINIC | Age: 63
End: 2023-12-12
Payer: MEDICAID

## 2023-12-12 PROCEDURE — G0277 HBOT, FULL BODY CHAMBER, 30M: HCPCS

## 2023-12-13 ENCOUNTER — PATIENT OUTREACH (OUTPATIENT)
Dept: PRIMARY CARE | Facility: CLINIC | Age: 63
End: 2023-12-13
Payer: MEDICAID

## 2023-12-13 ENCOUNTER — OFFICE VISIT (OUTPATIENT)
Dept: WOUND CARE | Facility: CLINIC | Age: 63
End: 2023-12-13
Payer: MEDICAID

## 2023-12-13 PROCEDURE — G0277 HBOT, FULL BODY CHAMBER, 30M: HCPCS

## 2023-12-13 NOTE — PROGRESS NOTES
Call placed regarding one month post discharge follow up call.  At time of outreach call the patient feels as if their condition has improved since initial visit with PCP or specialist.  No questions or concerns regarding recovery period addressed at this time. Reviewed any PCP or specialists progress notes/labs/radiology reports if applicable and addressed any questions or concerns.

## 2023-12-14 ENCOUNTER — HOME CARE VISIT (OUTPATIENT)
Dept: HOME HEALTH SERVICES | Facility: HOME HEALTH | Age: 63
End: 2023-12-14
Payer: MEDICAID

## 2023-12-14 ENCOUNTER — OFFICE VISIT (OUTPATIENT)
Dept: WOUND CARE | Facility: CLINIC | Age: 63
End: 2023-12-14
Payer: MEDICAID

## 2023-12-14 VITALS — HEART RATE: 74 BPM | TEMPERATURE: 97.1 F | RESPIRATION RATE: 18 BRPM

## 2023-12-14 PROCEDURE — G0277 HBOT, FULL BODY CHAMBER, 30M: HCPCS

## 2023-12-14 PROCEDURE — 99183 HYPERBARIC OXYGEN THERAPY: CPT | Performed by: PLASTIC SURGERY

## 2023-12-14 PROCEDURE — G0299 HHS/HOSPICE OF RN EA 15 MIN: HCPCS

## 2023-12-14 ASSESSMENT — ENCOUNTER SYMPTOMS
DENIES PAIN: 1
LOWER EXTREMITY EDEMA: 1
PERSON REPORTING PAIN: PATIENT
APPETITE LEVEL: GOOD

## 2023-12-15 ENCOUNTER — OFFICE VISIT (OUTPATIENT)
Dept: WOUND CARE | Facility: CLINIC | Age: 63
End: 2023-12-15
Payer: MEDICAID

## 2023-12-15 PROCEDURE — G0277 HBOT, FULL BODY CHAMBER, 30M: HCPCS

## 2023-12-15 PROCEDURE — 99183 HYPERBARIC OXYGEN THERAPY: CPT | Performed by: SURGERY

## 2023-12-16 ENCOUNTER — HOME CARE VISIT (OUTPATIENT)
Dept: HOME HEALTH SERVICES | Facility: HOME HEALTH | Age: 63
End: 2023-12-16
Payer: MEDICAID

## 2023-12-16 VITALS
HEART RATE: 76 BPM | TEMPERATURE: 97.5 F | SYSTOLIC BLOOD PRESSURE: 140 MMHG | OXYGEN SATURATION: 98 % | DIASTOLIC BLOOD PRESSURE: 84 MMHG | RESPIRATION RATE: 18 BRPM

## 2023-12-16 PROCEDURE — G0300 HHS/HOSPICE OF LPN EA 15 MIN: HCPCS

## 2023-12-16 ASSESSMENT — ENCOUNTER SYMPTOMS
DENIES PAIN: 1
CHANGE IN APPETITE: UNCHANGED
APPETITE LEVEL: GOOD
PERSON REPORTING PAIN: PATIENT

## 2023-12-18 ENCOUNTER — OFFICE VISIT (OUTPATIENT)
Dept: WOUND CARE | Facility: CLINIC | Age: 63
End: 2023-12-18
Payer: MEDICAID

## 2023-12-18 PROCEDURE — 1036F TOBACCO NON-USER: CPT | Performed by: PLASTIC SURGERY

## 2023-12-18 PROCEDURE — G0277 HBOT, FULL BODY CHAMBER, 30M: HCPCS

## 2023-12-18 PROCEDURE — 99183 HYPERBARIC OXYGEN THERAPY: CPT | Performed by: PLASTIC SURGERY

## 2023-12-18 PROCEDURE — 11043 DBRDMT MUSC&/FSCA 1ST 20/<: CPT | Performed by: PLASTIC SURGERY

## 2023-12-18 PROCEDURE — 11043 DBRDMT MUSC&/FSCA 1ST 20/<: CPT

## 2023-12-19 ENCOUNTER — OFFICE VISIT (OUTPATIENT)
Dept: WOUND CARE | Facility: CLINIC | Age: 63
End: 2023-12-19
Payer: MEDICAID

## 2023-12-19 PROCEDURE — G0277 HBOT, FULL BODY CHAMBER, 30M: HCPCS

## 2023-12-20 ENCOUNTER — OFFICE VISIT (OUTPATIENT)
Dept: WOUND CARE | Facility: CLINIC | Age: 63
End: 2023-12-20
Payer: MEDICAID

## 2023-12-20 PROCEDURE — G0277 HBOT, FULL BODY CHAMBER, 30M: HCPCS

## 2023-12-21 ENCOUNTER — OFFICE VISIT (OUTPATIENT)
Dept: WOUND CARE | Facility: CLINIC | Age: 63
End: 2023-12-21
Payer: MEDICAID

## 2023-12-21 ENCOUNTER — HOME CARE VISIT (OUTPATIENT)
Dept: HOME HEALTH SERVICES | Facility: HOME HEALTH | Age: 63
End: 2023-12-21
Payer: MEDICAID

## 2023-12-21 VITALS
TEMPERATURE: 97.6 F | RESPIRATION RATE: 20 BRPM | SYSTOLIC BLOOD PRESSURE: 124 MMHG | HEART RATE: 70 BPM | OXYGEN SATURATION: 98 % | DIASTOLIC BLOOD PRESSURE: 70 MMHG

## 2023-12-21 PROCEDURE — G0277 HBOT, FULL BODY CHAMBER, 30M: HCPCS

## 2023-12-21 PROCEDURE — 99183 HYPERBARIC OXYGEN THERAPY: CPT | Performed by: PLASTIC SURGERY

## 2023-12-21 PROCEDURE — G0299 HHS/HOSPICE OF RN EA 15 MIN: HCPCS

## 2023-12-21 ASSESSMENT — PAIN SCALES - PAIN ASSESSMENT IN ADVANCED DEMENTIA (PAINAD)
TOTALSCORE: 0
FACIALEXPRESSION: 0 - SMILING OR INEXPRESSIVE.
CONSOLABILITY: 0
FACIALEXPRESSION: 0
BODYLANGUAGE: 0
BREATHING: 0
NEGVOCALIZATION: 0
BODYLANGUAGE: 0 - RELAXED.
CONSOLABILITY: 0 - NO NEED TO CONSOLE.
NEGVOCALIZATION: 0 - NONE.

## 2023-12-21 ASSESSMENT — ENCOUNTER SYMPTOMS
PAIN SEVERITY GOAL: 0/10
CHANGE IN APPETITE: UNCHANGED
LOSS OF SENSATION IN FEET: 0
HIGHEST PAIN SEVERITY IN PAST 24 HOURS: 0/10
DENIES PAIN: 1
APPETITE LEVEL: GOOD
SUBJECTIVE PAIN PROGRESSION: UNCHANGED
LOWEST PAIN SEVERITY IN PAST 24 HOURS: 0/10
OCCASIONAL FEELINGS OF UNSTEADINESS: 0
DEPRESSION: 0

## 2023-12-22 ENCOUNTER — CLINICAL SUPPORT (OUTPATIENT)
Dept: WOUND CARE | Facility: CLINIC | Age: 63
End: 2023-12-22
Payer: MEDICAID

## 2023-12-22 PROCEDURE — G0277 HBOT, FULL BODY CHAMBER, 30M: HCPCS

## 2023-12-22 NOTE — HOME HEALTH
SKILLED NURSING VISIT FOR ASSESSMENT AND WOUND VAC DRESSING CHANGE. PT UNABLE TO LOCATE ANY WOUND VAC DRESSINGS AND DECLINED FOR SN TO REMOVE CURRENT VAC AND APPLY CLEAN DRY DRESSING UNTIL VAC SUPPLIES CAN BE OBTAINED. PT HAS HBO TOMORROW AM AND WILL DISCUSS DRESING CHANGE WITH NURSE AT VISIT. SN WILL ORDERED VAC SUPPLIES FOR PT.

## 2023-12-23 ENCOUNTER — HOME CARE VISIT (OUTPATIENT)
Dept: HOME HEALTH SERVICES | Facility: HOME HEALTH | Age: 63
End: 2023-12-23
Payer: MEDICAID

## 2023-12-23 VITALS
RESPIRATION RATE: 18 BRPM | HEART RATE: 76 BPM | SYSTOLIC BLOOD PRESSURE: 118 MMHG | OXYGEN SATURATION: 99 % | DIASTOLIC BLOOD PRESSURE: 76 MMHG

## 2023-12-23 PROCEDURE — G0300 HHS/HOSPICE OF LPN EA 15 MIN: HCPCS

## 2023-12-23 SDOH — ECONOMIC STABILITY: GENERAL

## 2023-12-23 ASSESSMENT — ACTIVITIES OF DAILY LIVING (ADL)
TOILETING: 1
MONEY MANAGEMENT (EXPENSES/BILLS): INDEPENDENT
FEEDING: INDEPENDENT
AMBULATION ASSISTANCE: 1
DRESSING_LB_CURRENT_FUNCTION: INDEPENDENT
TOILETING: INDEPENDENT
DRESSING_UB_CURRENT_FUNCTION: INDEPENDENT
CURRENT_FUNCTION: STAND BY ASSIST
BATHING ASSESSED: 1
BATHING_CURRENT_FUNCTION: INDEPENDENT
FEEDING ASSESSED: 1
AMBULATION ASSISTANCE: NON-AMBULATORY

## 2023-12-23 ASSESSMENT — ENCOUNTER SYMPTOMS
APPETITE LEVEL: GOOD
DENIES PAIN: 1
PERSON REPORTING PAIN: PATIENT
LIMITED RANGE OF MOTION: 1
MUSCLE WEAKNESS: 1
LOWER EXTREMITY EDEMA: 1
CHANGE IN APPETITE: UNCHANGED

## 2023-12-24 ENCOUNTER — APPOINTMENT (OUTPATIENT)
Dept: RADIOLOGY | Facility: HOSPITAL | Age: 63
End: 2023-12-24
Payer: MEDICAID

## 2023-12-24 ENCOUNTER — HOSPITAL ENCOUNTER (INPATIENT)
Facility: HOSPITAL | Age: 63
LOS: 2 days | Discharge: HOME HEALTH | End: 2023-12-27
Attending: STUDENT IN AN ORGANIZED HEALTH CARE EDUCATION/TRAINING PROGRAM | Admitting: STUDENT IN AN ORGANIZED HEALTH CARE EDUCATION/TRAINING PROGRAM
Payer: MEDICAID

## 2023-12-24 DIAGNOSIS — L03.115 CELLULITIS OF RIGHT LEG: Primary | ICD-10-CM

## 2023-12-24 LAB
ALBUMIN SERPL BCP-MCNC: 4.6 G/DL (ref 3.4–5)
ALP SERPL-CCNC: 61 U/L (ref 33–136)
ALT SERPL W P-5'-P-CCNC: 16 U/L (ref 7–45)
ANION GAP SERPL CALC-SCNC: 20 MMOL/L (ref 10–20)
AST SERPL W P-5'-P-CCNC: 15 U/L (ref 9–39)
BASOPHILS # BLD MANUAL: 0 X10*3/UL (ref 0–0.1)
BASOPHILS NFR BLD MANUAL: 0 %
BILIRUB SERPL-MCNC: 0.4 MG/DL (ref 0–1.2)
BUN SERPL-MCNC: 52 MG/DL (ref 6–23)
CALCIUM SERPL-MCNC: 9.9 MG/DL (ref 8.6–10.3)
CARDIAC TROPONIN I PNL SERPL HS: 22 NG/L (ref 0–13)
CARDIAC TROPONIN I PNL SERPL HS: 26 NG/L (ref 0–13)
CHLORIDE SERPL-SCNC: 101 MMOL/L (ref 98–107)
CO2 SERPL-SCNC: 23 MMOL/L (ref 21–32)
CREAT SERPL-MCNC: 2.85 MG/DL (ref 0.5–1.05)
EOSINOPHIL # BLD MANUAL: 0 X10*3/UL (ref 0–0.7)
EOSINOPHIL NFR BLD MANUAL: 0 %
ERYTHROCYTE [DISTWIDTH] IN BLOOD BY AUTOMATED COUNT: 14.4 % (ref 11.5–14.5)
GFR SERPL CREATININE-BSD FRML MDRD: 18 ML/MIN/1.73M*2
GLUCOSE SERPL-MCNC: 111 MG/DL (ref 74–99)
HCT VFR BLD AUTO: 40.8 % (ref 36–46)
HGB BLD-MCNC: 12.9 G/DL (ref 12–16)
IMM GRANULOCYTES # BLD AUTO: 0.2 X10*3/UL (ref 0–0.7)
IMM GRANULOCYTES NFR BLD AUTO: 0.7 % (ref 0–0.9)
INR PPP: 1 (ref 0.9–1.1)
LACTATE SERPL-SCNC: 2.4 MMOL/L (ref 0.4–2)
LIPASE SERPL-CCNC: 29 U/L (ref 9–82)
LYMPHOCYTES # BLD MANUAL: 0.86 X10*3/UL (ref 1.2–4.8)
LYMPHOCYTES NFR BLD MANUAL: 3 %
MCH RBC QN AUTO: 31.4 PG (ref 26–34)
MCHC RBC AUTO-ENTMCNC: 31.6 G/DL (ref 32–36)
MCV RBC AUTO: 99 FL (ref 80–100)
MONOCYTES # BLD MANUAL: 0 X10*3/UL (ref 0.1–1)
MONOCYTES NFR BLD MANUAL: 0 %
NEUTS SEG # BLD MANUAL: 27.74 X10*3/UL (ref 1.2–7)
NEUTS SEG NFR BLD MANUAL: 97 %
NRBC BLD-RTO: 0 /100 WBCS (ref 0–0)
PLATELET # BLD AUTO: 368 X10*3/UL (ref 150–450)
POTASSIUM SERPL-SCNC: 3.9 MMOL/L (ref 3.5–5.3)
PROT SERPL-MCNC: 7.1 G/DL (ref 6.4–8.2)
PROTHROMBIN TIME: 11.3 SECONDS (ref 9.8–12.8)
RBC # BLD AUTO: 4.11 X10*6/UL (ref 4–5.2)
RBC MORPH BLD: ABNORMAL
SODIUM SERPL-SCNC: 140 MMOL/L (ref 136–145)
TOTAL CELLS COUNTED BLD: 100
WBC # BLD AUTO: 28.6 X10*3/UL (ref 4.4–11.3)

## 2023-12-24 PROCEDURE — 96375 TX/PRO/DX INJ NEW DRUG ADDON: CPT

## 2023-12-24 PROCEDURE — 85610 PROTHROMBIN TIME: CPT

## 2023-12-24 PROCEDURE — 99285 EMERGENCY DEPT VISIT HI MDM: CPT | Mod: 25 | Performed by: STUDENT IN AN ORGANIZED HEALTH CARE EDUCATION/TRAINING PROGRAM

## 2023-12-24 PROCEDURE — 71045 X-RAY EXAM CHEST 1 VIEW: CPT

## 2023-12-24 PROCEDURE — 93010 ELECTROCARDIOGRAM REPORT: CPT | Performed by: STUDENT IN AN ORGANIZED HEALTH CARE EDUCATION/TRAINING PROGRAM

## 2023-12-24 PROCEDURE — 36415 COLL VENOUS BLD VENIPUNCTURE: CPT

## 2023-12-24 PROCEDURE — 85027 COMPLETE CBC AUTOMATED: CPT

## 2023-12-24 PROCEDURE — 96361 HYDRATE IV INFUSION ADD-ON: CPT

## 2023-12-24 PROCEDURE — 99285 EMERGENCY DEPT VISIT HI MDM: CPT | Performed by: STUDENT IN AN ORGANIZED HEALTH CARE EDUCATION/TRAINING PROGRAM

## 2023-12-24 PROCEDURE — 71045 X-RAY EXAM CHEST 1 VIEW: CPT | Performed by: RADIOLOGY

## 2023-12-24 PROCEDURE — 96365 THER/PROPH/DIAG IV INF INIT: CPT

## 2023-12-24 PROCEDURE — 85007 BL SMEAR W/DIFF WBC COUNT: CPT

## 2023-12-24 PROCEDURE — 87040 BLOOD CULTURE FOR BACTERIA: CPT | Mod: STJLAB

## 2023-12-24 PROCEDURE — 83605 ASSAY OF LACTIC ACID: CPT

## 2023-12-24 PROCEDURE — 83690 ASSAY OF LIPASE: CPT

## 2023-12-24 PROCEDURE — 82565 ASSAY OF CREATININE: CPT

## 2023-12-24 PROCEDURE — 84484 ASSAY OF TROPONIN QUANT: CPT

## 2023-12-24 PROCEDURE — 2500000004 HC RX 250 GENERAL PHARMACY W/ HCPCS (ALT 636 FOR OP/ED)

## 2023-12-24 RX ORDER — ONDANSETRON HYDROCHLORIDE 2 MG/ML
4 INJECTION, SOLUTION INTRAVENOUS ONCE
Status: COMPLETED | OUTPATIENT
Start: 2023-12-24 | End: 2023-12-24

## 2023-12-24 RX ADMIN — PIPERACILLIN SODIUM AND TAZOBACTAM SODIUM 4.5 G: 4; .5 INJECTION, SOLUTION INTRAVENOUS at 21:49

## 2023-12-24 RX ADMIN — ONDANSETRON 4 MG: 2 INJECTION INTRAMUSCULAR; INTRAVENOUS at 21:49

## 2023-12-24 RX ADMIN — SODIUM CHLORIDE, POTASSIUM CHLORIDE, SODIUM LACTATE AND CALCIUM CHLORIDE 1000 ML: 600; 310; 30; 20 INJECTION, SOLUTION INTRAVENOUS at 21:49

## 2023-12-24 RX ADMIN — Medication 2 G: at 21:30

## 2023-12-24 ASSESSMENT — PAIN - FUNCTIONAL ASSESSMENT: PAIN_FUNCTIONAL_ASSESSMENT: 0-10

## 2023-12-24 ASSESSMENT — LIFESTYLE VARIABLES
HAVE PEOPLE ANNOYED YOU BY CRITICIZING YOUR DRINKING: NO
REASON UNABLE TO ASSESS: NO
EVER FELT BAD OR GUILTY ABOUT YOUR DRINKING: NO
EVER HAD A DRINK FIRST THING IN THE MORNING TO STEADY YOUR NERVES TO GET RID OF A HANGOVER: NO
HAVE YOU EVER FELT YOU SHOULD CUT DOWN ON YOUR DRINKING: NO

## 2023-12-24 ASSESSMENT — PAIN SCALES - GENERAL: PAINLEVEL_OUTOF10: 8

## 2023-12-24 ASSESSMENT — PAIN DESCRIPTION - ORIENTATION: ORIENTATION: RIGHT;LOWER

## 2023-12-24 ASSESSMENT — PAIN DESCRIPTION - PAIN TYPE: TYPE: ACUTE PAIN

## 2023-12-24 ASSESSMENT — COLUMBIA-SUICIDE SEVERITY RATING SCALE - C-SSRS
2. HAVE YOU ACTUALLY HAD ANY THOUGHTS OF KILLING YOURSELF?: NO
6. HAVE YOU EVER DONE ANYTHING, STARTED TO DO ANYTHING, OR PREPARED TO DO ANYTHING TO END YOUR LIFE?: NO
1. IN THE PAST MONTH, HAVE YOU WISHED YOU WERE DEAD OR WISHED YOU COULD GO TO SLEEP AND NOT WAKE UP?: NO

## 2023-12-24 ASSESSMENT — PAIN DESCRIPTION - LOCATION: LOCATION: LEG

## 2023-12-25 PROBLEM — L03.115 CELLULITIS OF RIGHT LEG: Status: ACTIVE | Noted: 2023-12-25

## 2023-12-25 LAB
APPEARANCE UR: CLEAR
BILIRUB UR STRIP.AUTO-MCNC: NEGATIVE MG/DL
COLOR UR: ABNORMAL
GLUCOSE UR STRIP.AUTO-MCNC: NEGATIVE MG/DL
HOLD SPECIMEN: NORMAL
KETONES UR STRIP.AUTO-MCNC: NEGATIVE MG/DL
LACTATE SERPL-SCNC: 1.1 MMOL/L (ref 0.4–2)
LEUKOCYTE ESTERASE UR QL STRIP.AUTO: NEGATIVE
NITRITE UR QL STRIP.AUTO: NEGATIVE
PH UR STRIP.AUTO: 7 [PH]
PROT UR STRIP.AUTO-MCNC: ABNORMAL MG/DL
RBC # UR STRIP.AUTO: ABNORMAL /UL
RBC #/AREA URNS AUTO: NORMAL /HPF
SP GR UR STRIP.AUTO: 1.01
UROBILINOGEN UR STRIP.AUTO-MCNC: <2 MG/DL
VANCOMYCIN TROUGH SERPL-MCNC: 18.7 UG/ML (ref 5–20)
WBC #/AREA URNS AUTO: NORMAL /HPF

## 2023-12-25 PROCEDURE — 2500000001 HC RX 250 WO HCPCS SELF ADMINISTERED DRUGS (ALT 637 FOR MEDICARE OP): Performed by: STUDENT IN AN ORGANIZED HEALTH CARE EDUCATION/TRAINING PROGRAM

## 2023-12-25 PROCEDURE — 2500000004 HC RX 250 GENERAL PHARMACY W/ HCPCS (ALT 636 FOR OP/ED): Performed by: STUDENT IN AN ORGANIZED HEALTH CARE EDUCATION/TRAINING PROGRAM

## 2023-12-25 PROCEDURE — 36415 COLL VENOUS BLD VENIPUNCTURE: CPT

## 2023-12-25 PROCEDURE — 80202 ASSAY OF VANCOMYCIN: CPT | Performed by: STUDENT IN AN ORGANIZED HEALTH CARE EDUCATION/TRAINING PROGRAM

## 2023-12-25 PROCEDURE — 81001 URINALYSIS AUTO W/SCOPE: CPT

## 2023-12-25 PROCEDURE — 83605 ASSAY OF LACTIC ACID: CPT

## 2023-12-25 PROCEDURE — 2500000005 HC RX 250 GENERAL PHARMACY W/O HCPCS: Performed by: STUDENT IN AN ORGANIZED HEALTH CARE EDUCATION/TRAINING PROGRAM

## 2023-12-25 PROCEDURE — 99223 1ST HOSP IP/OBS HIGH 75: CPT | Performed by: STUDENT IN AN ORGANIZED HEALTH CARE EDUCATION/TRAINING PROGRAM

## 2023-12-25 PROCEDURE — 1100000001 HC PRIVATE ROOM DAILY

## 2023-12-25 RX ORDER — METOPROLOL TARTRATE 50 MG/1
50 TABLET ORAL 2 TIMES DAILY
Status: DISCONTINUED | OUTPATIENT
Start: 2023-12-25 | End: 2023-12-27 | Stop reason: HOSPADM

## 2023-12-25 RX ORDER — VANCOMYCIN HYDROCHLORIDE 1 G/20ML
INJECTION, POWDER, LYOPHILIZED, FOR SOLUTION INTRAVENOUS DAILY PRN
Status: DISCONTINUED | OUTPATIENT
Start: 2023-12-25 | End: 2023-12-26

## 2023-12-25 RX ORDER — METOPROLOL TARTRATE 50 MG/1
50 TABLET ORAL 2 TIMES DAILY
Status: DISCONTINUED | OUTPATIENT
Start: 2023-12-25 | End: 2023-12-25 | Stop reason: SDUPTHER

## 2023-12-25 RX ORDER — PREDNISONE 20 MG/1
20 TABLET ORAL DAILY
COMMUNITY
End: 2024-05-23 | Stop reason: WASHOUT

## 2023-12-25 RX ORDER — ONDANSETRON 4 MG/1
4 TABLET, ORALLY DISINTEGRATING ORAL EVERY 4 HOURS PRN
Status: DISCONTINUED | OUTPATIENT
Start: 2023-12-25 | End: 2023-12-27 | Stop reason: HOSPADM

## 2023-12-25 RX ORDER — PANTOPRAZOLE SODIUM 40 MG/1
40 TABLET, DELAYED RELEASE ORAL
Status: DISCONTINUED | OUTPATIENT
Start: 2023-12-26 | End: 2023-12-25 | Stop reason: SDUPTHER

## 2023-12-25 RX ORDER — ACETAMINOPHEN 325 MG/1
650 TABLET ORAL EVERY 6 HOURS PRN
Status: DISCONTINUED | OUTPATIENT
Start: 2023-12-25 | End: 2023-12-27 | Stop reason: HOSPADM

## 2023-12-25 RX ORDER — POLYETHYLENE GLYCOL 3350 17 G/17G
17 POWDER, FOR SOLUTION ORAL DAILY
Status: DISCONTINUED | OUTPATIENT
Start: 2023-12-25 | End: 2023-12-27 | Stop reason: HOSPADM

## 2023-12-25 RX ORDER — AMITRIPTYLINE HYDROCHLORIDE 50 MG/1
25 TABLET, FILM COATED ORAL NIGHTLY
Status: DISCONTINUED | OUTPATIENT
Start: 2023-12-25 | End: 2023-12-25 | Stop reason: SDUPTHER

## 2023-12-25 RX ORDER — PANTOPRAZOLE SODIUM 40 MG/1
40 TABLET, DELAYED RELEASE ORAL
Status: DISCONTINUED | OUTPATIENT
Start: 2023-12-25 | End: 2023-12-27 | Stop reason: HOSPADM

## 2023-12-25 RX ORDER — PREDNISONE 1 MG/1
1 TABLET ORAL DAILY
COMMUNITY

## 2023-12-25 RX ORDER — AMITRIPTYLINE HYDROCHLORIDE 50 MG/1
25 TABLET, FILM COATED ORAL NIGHTLY
Status: DISCONTINUED | OUTPATIENT
Start: 2023-12-25 | End: 2023-12-27 | Stop reason: HOSPADM

## 2023-12-25 RX ORDER — ASPIRIN 81 MG/1
81 TABLET ORAL DAILY
Status: DISCONTINUED | OUTPATIENT
Start: 2023-12-25 | End: 2023-12-27 | Stop reason: HOSPADM

## 2023-12-25 RX ORDER — PREDNISONE 1 MG/1
1 TABLET ORAL DAILY
Status: DISCONTINUED | OUTPATIENT
Start: 2023-12-25 | End: 2023-12-27 | Stop reason: HOSPADM

## 2023-12-25 RX ORDER — PREDNISONE 20 MG/1
20 TABLET ORAL DAILY
Status: DISCONTINUED | OUTPATIENT
Start: 2023-12-25 | End: 2023-12-27 | Stop reason: HOSPADM

## 2023-12-25 RX ORDER — AMOXICILLIN 250 MG
2 CAPSULE ORAL 2 TIMES DAILY
Status: DISCONTINUED | OUTPATIENT
Start: 2023-12-25 | End: 2023-12-27 | Stop reason: HOSPADM

## 2023-12-25 RX ORDER — LORATADINE 10 MG/1
10 TABLET ORAL DAILY
Status: DISCONTINUED | OUTPATIENT
Start: 2023-12-25 | End: 2023-12-27 | Stop reason: HOSPADM

## 2023-12-25 RX ADMIN — NIFEDIPINE 90 MG: 60 TABLET, FILM COATED, EXTENDED RELEASE ORAL at 21:10

## 2023-12-25 RX ADMIN — APIXABAN 2.5 MG: 2.5 TABLET, FILM COATED ORAL at 06:52

## 2023-12-25 RX ADMIN — ACETAMINOPHEN 650 MG: 325 TABLET ORAL at 20:02

## 2023-12-25 RX ADMIN — AMITRIPTYLINE HYDROCHLORIDE 25 MG: 50 TABLET, FILM COATED ORAL at 21:10

## 2023-12-25 RX ADMIN — APIXABAN 2.5 MG: 2.5 TABLET, FILM COATED ORAL at 21:10

## 2023-12-25 RX ADMIN — METOPROLOL TARTRATE 50 MG: 50 TABLET, FILM COATED ORAL at 10:01

## 2023-12-25 RX ADMIN — METOPROLOL TARTRATE 50 MG: 50 TABLET, FILM COATED ORAL at 21:10

## 2023-12-25 RX ADMIN — PANTOPRAZOLE SODIUM 40 MG: 40 TABLET, DELAYED RELEASE ORAL at 06:52

## 2023-12-25 RX ADMIN — NIFEDIPINE 90 MG: 60 TABLET, FILM COATED, EXTENDED RELEASE ORAL at 06:51

## 2023-12-25 RX ADMIN — SENNOSIDES AND DOCUSATE SODIUM 2 TABLET: 8.6; 5 TABLET ORAL at 10:01

## 2023-12-25 RX ADMIN — LORATADINE 10 MG: 10 TABLET ORAL at 10:00

## 2023-12-25 RX ADMIN — POLYETHYLENE GLYCOL 3350 17 G: 17 POWDER, FOR SOLUTION ORAL at 10:00

## 2023-12-25 RX ADMIN — PIPERACILLIN SODIUM AND TAZOBACTAM SODIUM 3.38 G: 3; .375 INJECTION, SOLUTION INTRAVENOUS at 10:01

## 2023-12-25 RX ADMIN — ASPIRIN 81 MG: 81 TABLET, COATED ORAL at 16:06

## 2023-12-25 RX ADMIN — ONDANSETRON 4 MG: 4 TABLET, ORALLY DISINTEGRATING ORAL at 20:02

## 2023-12-25 RX ADMIN — PIPERACILLIN SODIUM AND TAZOBACTAM SODIUM 3.38 G: 3; .375 INJECTION, SOLUTION INTRAVENOUS at 21:23

## 2023-12-25 RX ADMIN — PREDNISONE 25 MG: 5 TABLET ORAL at 10:00

## 2023-12-25 SDOH — SOCIAL STABILITY: SOCIAL INSECURITY: DO YOU FEEL UNSAFE GOING BACK TO THE PLACE WHERE YOU ARE LIVING?: NO

## 2023-12-25 SDOH — SOCIAL STABILITY: SOCIAL INSECURITY: HAVE YOU HAD THOUGHTS OF HARMING ANYONE ELSE?: NO

## 2023-12-25 SDOH — SOCIAL STABILITY: SOCIAL INSECURITY: ARE YOU OR HAVE YOU BEEN THREATENED OR ABUSED PHYSICALLY, EMOTIONALLY, OR SEXUALLY BY ANYONE?: NO

## 2023-12-25 SDOH — SOCIAL STABILITY: SOCIAL INSECURITY: ARE THERE ANY APPARENT SIGNS OF INJURIES/BEHAVIORS THAT COULD BE RELATED TO ABUSE/NEGLECT?: NO

## 2023-12-25 SDOH — SOCIAL STABILITY: SOCIAL INSECURITY: ABUSE: ADULT

## 2023-12-25 SDOH — SOCIAL STABILITY: SOCIAL INSECURITY: HAS ANYONE EVER THREATENED TO HURT YOUR FAMILY OR YOUR PETS?: NO

## 2023-12-25 SDOH — SOCIAL STABILITY: SOCIAL INSECURITY: DO YOU FEEL ANYONE HAS EXPLOITED OR TAKEN ADVANTAGE OF YOU FINANCIALLY OR OF YOUR PERSONAL PROPERTY?: NO

## 2023-12-25 SDOH — SOCIAL STABILITY: SOCIAL INSECURITY: DOES ANYONE TRY TO KEEP YOU FROM HAVING/CONTACTING OTHER FRIENDS OR DOING THINGS OUTSIDE YOUR HOME?: NO

## 2023-12-25 ASSESSMENT — COGNITIVE AND FUNCTIONAL STATUS - GENERAL
WALKING IN HOSPITAL ROOM: A LOT
HELP NEEDED FOR BATHING: A LITTLE
MOVING TO AND FROM BED TO CHAIR: A LITTLE
STANDING UP FROM CHAIR USING ARMS: A LITTLE
DRESSING REGULAR LOWER BODY CLOTHING: A LITTLE
HELP NEEDED FOR BATHING: A LITTLE
MOVING TO AND FROM BED TO CHAIR: A LITTLE
DAILY ACTIVITIY SCORE: 19
MOVING TO AND FROM BED TO CHAIR: A LITTLE
PERSONAL GROOMING: A LITTLE
DAILY ACTIVITIY SCORE: 19
MOVING FROM LYING ON BACK TO SITTING ON SIDE OF FLAT BED WITH BEDRAILS: A LITTLE
DRESSING REGULAR UPPER BODY CLOTHING: A LITTLE
TURNING FROM BACK TO SIDE WHILE IN FLAT BAD: A LITTLE
PATIENT BASELINE BEDBOUND: NO
WALKING IN HOSPITAL ROOM: A LOT
MOVING FROM LYING ON BACK TO SITTING ON SIDE OF FLAT BED WITH BEDRAILS: A LITTLE
MOBILITY SCORE: 16
TURNING FROM BACK TO SIDE WHILE IN FLAT BAD: A LITTLE
HELP NEEDED FOR BATHING: A LITTLE
DRESSING REGULAR LOWER BODY CLOTHING: A LITTLE
TOILETING: A LITTLE
TURNING FROM BACK TO SIDE WHILE IN FLAT BAD: A LITTLE
DRESSING REGULAR LOWER BODY CLOTHING: A LITTLE
CLIMB 3 TO 5 STEPS WITH RAILING: A LOT
PERSONAL GROOMING: A LITTLE
TOILETING: A LITTLE
PERSONAL GROOMING: A LITTLE
STANDING UP FROM CHAIR USING ARMS: A LITTLE
DRESSING REGULAR UPPER BODY CLOTHING: A LITTLE
WALKING IN HOSPITAL ROOM: A LOT
MOBILITY SCORE: 16
MOBILITY SCORE: 16
TOILETING: A LITTLE
CLIMB 3 TO 5 STEPS WITH RAILING: A LOT
MOVING FROM LYING ON BACK TO SITTING ON SIDE OF FLAT BED WITH BEDRAILS: A LITTLE
DAILY ACTIVITIY SCORE: 19
DRESSING REGULAR UPPER BODY CLOTHING: A LITTLE
STANDING UP FROM CHAIR USING ARMS: A LITTLE
CLIMB 3 TO 5 STEPS WITH RAILING: A LOT

## 2023-12-25 ASSESSMENT — ACTIVITIES OF DAILY LIVING (ADL)
GROOMING: INDEPENDENT
DRESSING YOURSELF: INDEPENDENT
TOILETING: INDEPENDENT
BATHING: INDEPENDENT
HEARING - LEFT EAR: FUNCTIONAL
FEEDING YOURSELF: INDEPENDENT
WALKS IN HOME: INDEPENDENT
PATIENT'S MEMORY ADEQUATE TO SAFELY COMPLETE DAILY ACTIVITIES?: YES
LACK_OF_TRANSPORTATION: NO
JUDGMENT_ADEQUATE_SAFELY_COMPLETE_DAILY_ACTIVITIES: YES
HEARING - RIGHT EAR: FUNCTIONAL
ADEQUATE_TO_COMPLETE_ADL: YES

## 2023-12-25 ASSESSMENT — PAIN SCALES - GENERAL
PAINLEVEL_OUTOF10: 6
PAINLEVEL_OUTOF10: 5 - MODERATE PAIN
PAINLEVEL_OUTOF10: 4
PAINLEVEL_OUTOF10: 3

## 2023-12-25 ASSESSMENT — PATIENT HEALTH QUESTIONNAIRE - PHQ9
1. LITTLE INTEREST OR PLEASURE IN DOING THINGS: NOT AT ALL
2. FEELING DOWN, DEPRESSED OR HOPELESS: NOT AT ALL
SUM OF ALL RESPONSES TO PHQ9 QUESTIONS 1 & 2: 0

## 2023-12-25 ASSESSMENT — LIFESTYLE VARIABLES
HOW OFTEN DO YOU HAVE 6 OR MORE DRINKS ON ONE OCCASION: NEVER
SUBSTANCE_ABUSE_PAST_12_MONTHS: NO
AUDIT-C TOTAL SCORE: 2
AUDIT-C TOTAL SCORE: 2
HOW OFTEN DO YOU HAVE A DRINK CONTAINING ALCOHOL: 2-4 TIMES A MONTH
PRESCIPTION_ABUSE_PAST_12_MONTHS: NO
HOW MANY STANDARD DRINKS CONTAINING ALCOHOL DO YOU HAVE ON A TYPICAL DAY: 1 OR 2
SKIP TO QUESTIONS 9-10: 1

## 2023-12-25 ASSESSMENT — PAIN - FUNCTIONAL ASSESSMENT
PAIN_FUNCTIONAL_ASSESSMENT: 0-10

## 2023-12-25 NOTE — H&P
History Of Present Illness  Estephania Hernandez is a 63 y.o. 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  The culture grew Pseudomonas and MRSA and Corynebacterium striatum.  She was on daptomycin and ceftazidime for about 12 weeks then transition to doxycycline for suppression.  She was admitted in October with right lower extremity cellulitis and was discharged on Augmentin with complete resolution of the cellulitis.  She still has wound VAC in place.  She has had around 2 years of intermittent infections in her leg requiring hyperbaric oxygen therapy and wound vacs and recurrent antibiotic treatments and follows with infectious disease Dr. Busby.    Her symptoms started the night prior to admission, she had lower extremity cellulitis and pain and subsequently came to the emergency room in the evening.  She was found to have an elevated white count, sinus tachycardia, lactic acidosis but was normotensive to hypertensive.  She received vancomycin and Zosyn and conservative fluid resuscitation with 1 L of LR due to prior history of congestive heart failure, blood cultures were collected and she was admitted for management of lower extremity cellulitis requiring IV antibiotics considering prior history of MDR pathogens.    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 with recurrent infxns due to MDR pathogens with wound vac receiving HBOT, prior history of cardiac arrest in 2023 after presenting with sepsis and required CRRT but her renal function has returned and is no longer requiring HD, Takotsubo's cardiomyopathy since resolved, DVT/PE on maintenance Eliquis, breast cancer status post bilateral mastectomy around 10 years ago status  "post tamoxifen, PMR GCA on chronic steroids for around the last 5 years, CKD 4, PID, primary hypertension    Surgical History  Appendectomy, bilateral mastectomy     Social History  She reports that she quit smoking about 42 years ago. Her smoking use included cigarettes. She has never used smokeless tobacco. She reports current alcohol use of about 3.0 standard drinks of alcohol per week. She reports current drug use. Drug: Marijuana.    Family History  Family History   Problem Relation Name Age of Onset    Other (Arteriossclerrosis of nonautologous coronary artery bypass graft without angina pectoris) Sibling          Allergies  House dust mite, Mold, Cat dander, Dog dander, Sutures, Egg, Corn, and Doxycycline    Physical Exam  Constitutional: Awake/alert/oriented x3  Eyes: Clear sclera  Head/Neck: Normocephalic, atraumatic  Respiratory/Thorax: CTAB  Cardiovascular: RRR  Gastrointestinal: Non-TTP  Musculoskeletal: FROM  Extremities: Wound VAC in place on right lower extremity, surrounding erythema and cellulitis  Psychological: Appropriate mood and behavior  Skin: Warm and dry, no jaundice     Last Recorded Vitals  Blood pressure (!) 160/97, pulse (!) 121, temperature 37 °C (98.6 °F), temperature source Temporal, resp. rate 18, height 1.575 m (5' 2\"), weight 75.8 kg (167 lb 3.2 oz), SpO2 96 %.    Relevant Results    Results for orders placed or performed during the hospital encounter of 12/24/23 (from the past 24 hour(s))   CBC and Auto Differential   Result Value Ref Range    WBC 28.6 (H) 4.4 - 11.3 x10*3/uL    nRBC 0.0 0.0 - 0.0 /100 WBCs    RBC 4.11 4.00 - 5.20 x10*6/uL    Hemoglobin 12.9 12.0 - 16.0 g/dL    Hematocrit 40.8 36.0 - 46.0 %    MCV 99 80 - 100 fL    MCH 31.4 26.0 - 34.0 pg    MCHC 31.6 (L) 32.0 - 36.0 g/dL    RDW 14.4 11.5 - 14.5 %    Platelets 368 150 - 450 x10*3/uL    Immature Granulocytes %, Automated 0.7 0.0 - 0.9 %    Immature Granulocytes Absolute, Automated 0.20 0.00 - 0.70 x10*3/uL "   Comprehensive Metabolic Panel   Result Value Ref Range    Glucose 111 (H) 74 - 99 mg/dL    Sodium 140 136 - 145 mmol/L    Potassium 3.9 3.5 - 5.3 mmol/L    Chloride 101 98 - 107 mmol/L    Bicarbonate 23 21 - 32 mmol/L    Anion Gap 20 10 - 20 mmol/L    Urea Nitrogen 52 (H) 6 - 23 mg/dL    Creatinine 2.85 (H) 0.50 - 1.05 mg/dL    eGFR 18 (L) >60 mL/min/1.73m*2    Calcium 9.9 8.6 - 10.3 mg/dL    Albumin 4.6 3.4 - 5.0 g/dL    Alkaline Phosphatase 61 33 - 136 U/L    Total Protein 7.1 6.4 - 8.2 g/dL    AST 15 9 - 39 U/L    Bilirubin, Total 0.4 0.0 - 1.2 mg/dL    ALT 16 7 - 45 U/L   Lactate   Result Value Ref Range    Lactate 2.4 (H) 0.4 - 2.0 mmol/L   Troponin I, High Sensitivity   Result Value Ref Range    Troponin I, High Sensitivity 22 (H) 0 - 13 ng/L   Protime-INR   Result Value Ref Range    Protime 11.3 9.8 - 12.8 seconds    INR 1.0 0.9 - 1.1   Blood Culture    Specimen: Peripheral Venipuncture; Blood culture   Result Value Ref Range    Blood Culture Loaded on Instrument - Culture in progress    Lipase   Result Value Ref Range    Lipase 29 9 - 82 U/L   Manual Differential   Result Value Ref Range    Neutrophils %, Manual 97.0 40.0 - 80.0 %    Lymphocytes %, Manual 3.0 13.0 - 44.0 %    Monocytes %, Manual 0.0 2.0 - 10.0 %    Eosinophils %, Manual 0.0 0.0 - 6.0 %    Basophils %, Manual 0.0 0.0 - 2.0 %    Seg Neutrophils Absolute, Manual 27.74 (H) 1.20 - 7.00 x10*3/uL    Lymphocytes Absolute, Manual 0.86 (L) 1.20 - 4.80 x10*3/uL    Monocytes Absolute, Manual 0.00 (L) 0.10 - 1.00 x10*3/uL    Eosinophils Absolute, Manual 0.00 0.00 - 0.70 x10*3/uL    Basophils Absolute, Manual 0.00 0.00 - 0.10 x10*3/uL    Total Cells Counted 100     RBC Morphology No significant RBC morphology present    Blood Culture    Specimen: Peripheral Venipuncture; Blood culture   Result Value Ref Range    Blood Culture Loaded on Instrument - Culture in progress    Troponin I, High Sensitivity   Result Value Ref Range    Troponin I, High  Sensitivity 26 (H) 0 - 13 ng/L   Lactate   Result Value Ref Range    Lactate 1.1 0.4 - 2.0 mmol/L   Urinalysis with Reflex Culture and Microscopic   Result Value Ref Range    Color, Urine Straw Straw, Yellow    Appearance, Urine Clear Clear    Specific Gravity, Urine 1.010 1.005 - 1.035    pH, Urine 7.0 5.0, 5.5, 6.0, 6.5, 7.0, 7.5, 8.0    Protein, Urine 100 (2+) (N) NEGATIVE mg/dL    Glucose, Urine NEGATIVE NEGATIVE mg/dL    Blood, Urine SMALL (1+) (A) NEGATIVE    Ketones, Urine NEGATIVE NEGATIVE mg/dL    Bilirubin, Urine NEGATIVE NEGATIVE    Urobilinogen, Urine <2.0 <2.0 mg/dL    Nitrite, Urine NEGATIVE NEGATIVE    Leukocyte Esterase, Urine NEGATIVE NEGATIVE   Urinalysis Microscopic   Result Value Ref Range    WBC, Urine NONE 1-5, NONE /HPF    RBC, Urine 1-2 NONE, 1-2, 3-5 /HPF        Results for orders placed during the hospital encounter of 12/24/23    XR chest 1 view    Narrative  Interpreted By:  Ivet Sanders,  STUDY:  XR CHEST 1 VIEW;  12/24/2023 9:34 pm    INDICATION:  Signs/Symptoms:vomiting, sepsis    COMPARISON:  Chest x-ray 07/28/2023.    ACCESSION NUMBER(S):  [id number]    ORDERING CLINICIAN:  LULÚ COHEN    TECHNIQUE:  Portable upright frontal view of the chest was obtained .    FINDINGS:  Monitoring wires are overlying the patient.    The cardiomediastinal silhouette is within normal limits.    No focal consolidation, pleural effusion or pneumothorax.  Redemonstration of calcified granuloma at the right upper lung.    Impression  1.  No radiographic evidence of acute cardiopulmonary process.        MACRO:  None.    Signed by: Ivet Sanders 12/24/2023 10:55 PM  Dictation workstation:   [id number]      Assessment/Plan    Principal Problem:    Cellulitis of right leg  Active Problems:    Hypertension, essential, benign    Chronic diastolic CHF (congestive heart failure) (CMS/HCC)    GCA (giant cell arteritis) (CMS/HCC)    History of breast cancer    Takotsubo cardiomyopathy    PMR (polymyalgia  rheumatica) (CMS/[facility])    Stage 4 chronic kidney disease (CMS/[facility])    GERD (gastroesophageal reflux disease)    History of pulmonary embolism    Resistant hypertension    Overweight with body mass index (BMI) of 28 to 28.9 in adult     -Feeling much improved following fluids and IV antibiotics in the emergency room with improvement in her tachycardia  -Blood cultures collected and pending results, continuing vancomycin and Zosyn, infectious disease consulted  -Continue home Procardia, metoprolol  -Continue home prednisone daily for PMR  -Bowel regimen with Senokot and MiraLAX  -Continue home amitriptyline  -Continue home Eliquis 2.5 mg twice daily renally dosed  -Continue home Protonix    Dispo: Pending infectious disease plan, likely home. LOS > 2 MN    Plan of care was discussed extensively with patient. Patient verbalized understanding through teach back method. All questions and concerns addressed upon examination.   Sarthak Funes DO  Complexity: High  Total visit time = > 75 minutes; more than 50% spent counseling/coordinating care  ###Of note, this documentation is completed using the Dragon Dictation system (voice recognition software). There may be spelling and/or grammatical errors that were not corrected prior to final submission.**

## 2023-12-25 NOTE — PROGRESS NOTES
"Vancomycin Dosing by Pharmacy- INITIAL    Estephania Hernandez is a 63 y.o. year old female who Pharmacy has been consulted for vancomycin dosing for cellulitis, skin and soft tissue. Based on the patient's indication and renal status this patient will be dosed based on a goal trough/random level of 10-15.     Renal function is currently stable.    Visit Vitals  BP (!) 160/97 (BP Location: Left arm, Patient Position: Lying)   Pulse (!) 121   Temp 37 °C (98.6 °F) (Temporal)   Resp 18        Lab Results   Component Value Date    CREATININE 2.85 (H) 12/24/2023    CREATININE 2.37 (H) 10/30/2023    CREATININE 2.28 (H) 10/29/2023    CREATININE 2.65 (H) 10/28/2023        Patient weight is No results found for: \"PTWEIGHT\"    No results found for: \"CULTURE\"     No intake/output data recorded.  [unfilled]    Lab Results   Component Value Date    PATIENTTEMP 37.0 05/20/2023    PATIENTTEMP 37.0 05/20/2023    PATIENTTEMP 37.0 05/19/2023          Assessment/Plan     Patient has already been given a loading dose of 2000 mg.  Follow-up level will be ordered on 12/25 at 2100 unless clinically indicated sooner.  Will continue to monitor renal function daily while on vancomycin and order serum creatinine at least every 48 hours if not already ordered.  Follow for continued vancomycin needs, clinical response, and signs/symptoms of toxicity.       Will Hurtado PharmD       "

## 2023-12-25 NOTE — ED PROVIDER NOTES
EMERGENCY DEPARTMENT ENCOUNTER      Pt Name: Estephania Hernandez  MRN: [id number]  Birthdate 1960  Date of evaluation: 12/24/2023  Provider: Zane Downing MD    CHIEF COMPLAINT       Chief Complaint   Patient presents with    Leg Swelling     Patient c/o right lower leg cellulitis, multiple previous surgery's, with wound vac in place.         HISTORY OF PRESENT ILLNESS    HPI  Patient is a 63-year-old female presenting with concern for cellulitis of the right leg.  She notes increasing pain from the foot to the mid calf, swelling, redness since last night.  This been an intermittent problem for well over a year.  She broke her leg during a fall and had to have multiple procedures done.  She ended up with a chronic wound and currently has a wound VAC on.  She is been hospitalized numerous times for infections of the limb.  She notes fevers, sweats, chills, nausea with vomiting, poor oral intake.  She denies chest pain, shortness of breath, abdominal pain, diarrhea, urinary symptoms.    Nursing Notes were reviewed.    PAST MEDICAL HISTORY     Past Medical History:   Diagnosis Date    Essential (primary) hypertension 12/30/2022    Hypertension, essential, benign         SURGICAL HISTORY       Past Surgical History:   Procedure Laterality Date    OTHER SURGICAL HISTORY  10/21/2022    Mastectomy    OTHER SURGICAL HISTORY  10/21/2022    Lower leg fracture repair    OTHER SURGICAL HISTORY  10/21/2022    Ankle fracture repair    OTHER SURGICAL HISTORY  10/21/2022    Lower leg fracture repair    OTHER SURGICAL HISTORY  10/21/2022    Colonoscopy    OTHER SURGICAL HISTORY  10/21/2022    Temporal artery biopsy    OTHER SURGICAL HISTORY  10/21/2022    Mastectomy bilateral    OTHER SURGICAL HISTORY  10/21/2022    Appendectomy         CURRENT MEDICATIONS       Previous Medications    ACETAMINOPHEN (TYLENOL) 325 MG TABLET    Take 2 tablets by mouth every 4 hours if needed.    AMITRIPTYLINE (ELAVIL) 25 MG TABLET    Take 1 tablet (25  mg) by mouth once daily at bedtime.    APIXABAN (ELIQUIS) 2.5 MG TABLET    Take 1 tablet (2.5 mg) by mouth 2 times a day.    ASPIRIN 81 MG EC TABLET    Take 1 tablet by mouth once daily.    FEXOFENADINE (ALLEGRA) 180 MG TABLET    Take 1 tablet (180 mg) by mouth once daily.    METOPROLOL TARTRATE (LOPRESSOR) 50 MG TABLET    Take 1 tablet by mouth 2 times a day.    NIFEDIPINE XL (PROCARDIA XL) 90 MG 24 HR TABLET    Take 1 tablet (90 mg) by mouth in the morning and 1 tablet (90 mg) before bedtime.    OMEPRAZOLE (PRILOSEC) 20 MG DR CAPSULE    Take 1 capsule (20 mg) by mouth once daily.    PREDNISONE 5 MG TABLET,DELAYED RELEASE (DR/EC)    Take 25 mg by mouth once daily. Take (5) Tabs daily. (25MG)    REPATHA SURECLICK 140 MG/ML INJECTION    INJECT 140MG SUBCUTEANOUSLY EVERY 2 WEEKS       ALLERGIES     House dust mite, Mold, Cat dander, Dog dander, Sutures, Egg, Corn, and Doxycycline    FAMILY HISTORY       Family History   Problem Relation Name Age of Onset    Other (Arteriossclerrosis of nonautologous coronary artery bypass graft without angina pectoris) Sibling            SOCIAL HISTORY       Social History     Socioeconomic History    Marital status:      Spouse name: None    Number of children: None    Years of education: None    Highest education level: None   Occupational History    None   Tobacco Use    Smoking status: Former     Types: Cigarettes     Quit date: 1982     Years since quittin.0    Smokeless tobacco: Never   Vaping Use    Vaping Use: Former   Substance and Sexual Activity    Alcohol use: Yes     Alcohol/week: 3.0 standard drinks of alcohol     Types: 3 Shots of liquor per week    Drug use: Yes     Types: Marijuana    Sexual activity: Not Currently   Other Topics Concern    None   Social History Narrative    None     Social Determinants of Health     Financial Resource Strain: Medium Risk (10/28/2023)    Overall Financial Resource Strain (CARDIA)     Difficulty of Paying Living  Expenses: Somewhat hard   Food Insecurity: No Food Insecurity (10/28/2023)    Hunger Vital Sign     Worried About Running Out of Food in the Last Year: Never true     Ran Out of Food in the Last Year: Never true   Transportation Needs: No Transportation Needs (11/1/2023)    OASIS : Transportation     Lack of Transportation (Medical): No     Lack of Transportation (Non-Medical): No     Patient Unable or Declines to Respond: No   Physical Activity: Inactive (10/28/2023)    Exercise Vital Sign     Days of Exercise per Week: 0 days     Minutes of Exercise per Session: 0 min   Stress: No Stress Concern Present (10/28/2023)    [employer] - Occupational Stress Questionnaire     Feeling of Stress : Not at all   Social Connections: Feeling Socially Integrated (11/1/2023)    OASIS : Social Isolation     Frequency of experiencing loneliness or isolation: Never   Intimate Partner Violence: Not At Risk (10/28/2023)    Humiliation, Afraid, Rape, and Kick questionnaire     Fear of Current or Ex-Partner: No     Emotionally Abused: No     Physically Abused: No     Sexually Abused: No   Housing Stability: Low Risk  (10/28/2023)    Housing Stability Vital Sign     Unable to Pay for Housing in the Last Year: No     Number of Places Lived in the Last Year: 1     Unstable Housing in the Last Year: No       SCREENINGS                        PHYSICAL EXAM    (up to 7 for level 4, 8 or more for level 5)     ED Triage Vitals [12/24/23 2105]   Temp Heart Rate Resp BP   36.5 °C (97.7 °F) (!) 130 18 (!) 169/95      SpO2 Temp Source Heart Rate Source Patient Position   99 % Temporal Monitor Sitting      BP Location FiO2 (%)     Right arm --       Physical Exam  Constitutional:       General: She is not in acute distress.     Appearance: She is not toxic-appearing.   HENT:      Head: Normocephalic and atraumatic.      Nose: Nose normal. No congestion or rhinorrhea.      Mouth/Throat:      Mouth: Mucous  membranes are dry.      Pharynx: Oropharynx is clear.   Eyes:      General:         Right eye: No discharge.         Left eye: No discharge.      Extraocular Movements: Extraocular movements intact.      Conjunctiva/sclera: Conjunctivae normal.      Pupils: Pupils are equal, round, and reactive to light.   Cardiovascular:      Rate and Rhythm: Regular rhythm. Tachycardia present.      Heart sounds: No murmur heard.     No friction rub. No gallop.   Pulmonary:      Effort: Pulmonary effort is normal. No respiratory distress.      Breath sounds: Normal breath sounds.   Abdominal:      General: There is no distension.      Palpations: Abdomen is soft.      Tenderness: There is no abdominal tenderness.   Musculoskeletal:      Cervical back: Normal range of motion and neck supple.      Right lower leg: Edema present.      Left lower leg: No edema.   Skin:     General: Skin is warm and dry.      Comments: Circumferential zone of edema and erythema from foot to mid calf surrounding right lower extremity wound with wound VAC in place.  No palpable crepitus.   Neurological:      Comments: Oriented to person and place only.          DIAGNOSTIC RESULTS     LABS:  Labs Reviewed   BLOOD CULTURE - Abnormal       Result Value    Blood Culture        Value: Identification and susceptibility testing to follow    Gram Stain Gram negative bacilli (*)    CBC WITH AUTO DIFFERENTIAL - Abnormal    WBC 28.6 (*)     nRBC 0.0      RBC 4.11      Hemoglobin 12.9      Hematocrit 40.8      MCV 99      MCH 31.4      MCHC 31.6 (*)     RDW 14.4      Platelets 368      Immature Granulocytes %, Automated 0.7      Immature Granulocytes Absolute, Automated 0.20      Narrative:     The previously reported component Neutrophils % is no longer being reported.  The previously reported component Lymphocytes % is no longer being reported.  The previously reported component Monocytes % is no longer being reported.  The previously   reported component  Eosinophils % is no longer being reported.  The previously reported component Basophils % is no longer being reported.  The previously reported component Absolute Neutrophils is no longer being reported.  The previously reported   component Absolute Lymphocytes is no longer being reported.  The previously reported component Absolute Monocytes is no longer being reported.  The previously reported component Absolute Eosinophils is no longer being reported.  The previously reported   component Absolute Basophils is no longer being reported.   COMPREHENSIVE METABOLIC PANEL - Abnormal    Glucose 111 (*)     Sodium 140      Potassium 3.9      Chloride 101      Bicarbonate 23      Anion Gap 20      Urea Nitrogen 52 (*)     Creatinine 2.85 (*)     eGFR 18 (*)     Calcium 9.9      Albumin 4.6      Alkaline Phosphatase 61      Total Protein 7.1      AST 15      Bilirubin, Total 0.4      ALT 16     LACTATE - Abnormal    Lactate 2.4 (*)     Narrative:     Venipuncture immediately after or during the administration of Metamizole may lead to falsely low results. Testing should be performed immediately  prior to Metamizole dosing.   TROPONIN I, HIGH SENSITIVITY - Abnormal    Troponin I, High Sensitivity 22 (*)     Narrative:     Less than 99th percentile of normal range cutoff-  Female and children under 18 years old <14 ng/L; Male <21 ng/L: Negative  Repeat testing should be performed if clinically indicated.     Female and children under 18 years old 14-50 ng/L; Male 21-50 ng/L:  Consistent with possible cardiac damage and possible increased clinical   risk. Serial measurements may help to assess extent of myocardial damage.     >50 ng/L: Consistent with cardiac damage, increased clinical risk and  myocardial infarction. Serial measurements may help assess extent of   myocardial damage.      NOTE: Children less than 1 year old may have higher baseline troponin   levels and results should be interpreted in conjunction with the  overall   clinical context.     NOTE: Troponin I testing is performed using a different   testing methodology at [facility] [place] than at other   [facility]. Direct result comparisons should only   be made within the same method.   URINALYSIS WITH REFLEX CULTURE AND MICROSCOPIC - Abnormal    Color, Urine Straw      Appearance, Urine Clear      Specific Gravity, Urine 1.010      pH, Urine 7.0      Protein, Urine 100 (2+) (*)     Glucose, Urine NEGATIVE      Blood, Urine SMALL (1+) (*)     Ketones, Urine NEGATIVE      Bilirubin, Urine NEGATIVE      Urobilinogen, Urine <2.0      Nitrite, Urine NEGATIVE      Leukocyte Esterase, Urine NEGATIVE     TROPONIN I, HIGH SENSITIVITY - Abnormal    Troponin I, High Sensitivity 26 (*)     Narrative:     Less than 99th percentile of normal range cutoff-  Female and children under 18 years old <14 ng/L; Male <21 ng/L: Negative  Repeat testing should be performed if clinically indicated.     Female and children under 18 years old 14-50 ng/L; Male 21-50 ng/L:  Consistent with possible cardiac damage and possible increased clinical   risk. Serial measurements may help to assess extent of myocardial damage.     >50 ng/L: Consistent with cardiac damage, increased clinical risk and  myocardial infarction. Serial measurements may help assess extent of   myocardial damage.      NOTE: Children less than 1 year old may have higher baseline troponin   levels and results should be interpreted in conjunction with the overall   clinical context.     NOTE: Troponin I testing is performed using a different   testing methodology at [facility] [place] than at other   [facility]. Direct result comparisons should only   be made within the same method.   MANUAL DIFFERENTIAL - Abnormal    Neutrophils %, Manual 97.0      Lymphocytes %, Manual 3.0      Monocytes %, Manual 0.0      Eosinophils %, Manual 0.0      Basophils %, Manual 0.0      Seg Neutrophils Absolute, Manual 27.74  (*)     Lymphocytes Absolute, Manual 0.86 (*)     Monocytes Absolute, Manual 0.00 (*)     Eosinophils Absolute, Manual 0.00      Basophils Absolute, Manual 0.00      Total Cells Counted 100      RBC Morphology No significant RBC morphology present     BLOOD CULTURE - Normal    Blood Culture No growth at 1 day     PROTIME-INR - Normal    Protime 11.3      INR 1.0     LIPASE - Normal    Lipase 29      Narrative:     Venipuncture immediately after or during the administration of Metamizole may lead to falsely low results. Testing should be performed immediately prior to Metamizole dosing.   LACTATE - Normal    Lactate 1.1      Narrative:     Venipuncture immediately after or during the administration of Metamizole may lead to falsely low results. Testing should be performed immediately  prior to Metamizole dosing.   VANCOMYCIN, TROUGH - Normal    Vancomycin, Trough 18.7     URINALYSIS MICROSCOPIC WITH REFLEX CULTURE - Normal    WBC, Urine NONE      RBC, Urine 1-2     URINALYSIS WITH REFLEX CULTURE AND MICROSCOPIC    Narrative:     The following orders were created for panel order Urinalysis with Reflex Culture and Microscopic.  Procedure                               Abnormality         Status                     ---------                               -----------         ------                     Urinalysis with Reflex C...[[id number]]  Abnormal            Final result               Extra Urine Gray Tube[[id number]]                            Final result                 Please view results for these tests on the individual orders.   EXTRA URINE GRAY TUBE    Extra Tube Hold for add-ons.     CBC   BASIC METABOLIC PANEL   MAGNESIUM       All other labs were within normal range or not returned as of this dictation.    Imaging  XR chest 1 view   Final Result   1.  No radiographic evidence of acute cardiopulmonary process.                  MACRO:   None.        Signed by: Ivet Sanders 12/24/2023 10:55 PM   Dictation  workstation:   [id number]           Procedures  Procedures     EMERGENCY DEPARTMENT COURSE/MDM:     Diagnoses as of 12/26/23 0540   Cellulitis of right leg        Medical Decision Making  History obtained from the patient and her family.  Records including labs, imaging, notes reviewed.  Primary concern for possible recurrent cellulitis of the right lower extremity.  Blood cultures were sent and patient was started on empiric broad-spectrum antibiotics.  Hematology notable for a white count of 28.6.  Patient meeting SIRS criteria with tachycardia and leukocytosis.  After 2 hours and IVF bolus, patient with moist mucous membranes and  Less than 2 seconds cap refill.  Chemistry notable for stable chronic kidney failure  Troponin mildly elevated at 26 which is to be her baseline.  EKG without acute injury pattern.  Presentation most consistent with recurrent cellulitis of the right lower extremity.  Patient subsequently admitted to the medicine service for further evaluation and treatment.      EKG demonstrates sinus tachycardia at a rate of 125 bpm, QTc 438.  No acute injury pattern seen.    Patient and or family in agreement and understanding of treatment plan.  All questions answered.      I reviewed the case with the attending ED physician. The attending ED physician agrees with the plan. Patient and/or patient´s representative was counseled regarding labs, imaging, likely diagnosis, and plan. All questions were answered.    ED Medications administered this visit:    Medications   lactated Ringer's bolus 1,000 mL (has no administration in time range)   piperacillin-tazobactam-dextrose (Zosyn) IV 4.5 g (has no administration in time range)   vancomycin (Vancocin) in sodium chloride 0.9% 500 mL IV 2 g (has no administration in time range)   ondansetron (Zofran) injection 4 mg (has no administration in time range)       New Prescriptions from this visit:    New Prescriptions    No medications on file        Follow-up:  No follow-up provider specified.      Final Impression: No diagnosis found.      (Please note that portions of this note were completed with a voice recognition program.  Efforts were made to edit the dictations but occasionally words are mis-transcribed.)     Zane Downing MD  Resident  12/26/23 4251

## 2023-12-25 NOTE — CARE PLAN
Pt admitted from ED around 0130. Pt A&Ox4, afebrile. Pt hypertensive, MD aware - home meds added. Pt denies nausea at this time. UA collected and sent down. Pictures of RLE in chart. Pt reports 5/10 pain, but declining pain medication at this time. Pt currently resting in bed, lowest and locked position, with call bell in reach and bed alarm activated. Will continue to monitor for remainder of shift.         Problem: Fall/Injury  Goal: Not fall by end of shift  Outcome: Progressing  Goal: Be free from injury by end of the shift  Outcome: Progressing  Goal: Verbalize understanding of personal risk factors for fall in the hospital  Outcome: Progressing  Goal: Verbalize understanding of risk factor reduction measures to prevent injury from fall in the home  Outcome: Progressing  Goal: Use assistive devices by end of the shift  Outcome: Progressing  Goal: Pace activities to prevent fatigue by end of the shift  Outcome: Progressing     Problem: Skin  Goal: Decreased wound size/increased tissue granulation at next dressing change  Outcome: Progressing  Goal: Participates in plan/prevention/treatment measures  Outcome: Progressing  Goal: Prevent/manage excess moisture  Outcome: Progressing  Goal: Prevent/minimize sheer/friction injuries  Outcome: Progressing  Goal: Promote/optimize nutrition  Outcome: Progressing  Goal: Promote skin healing  Outcome: Progressing     Problem: Pain  Goal: Takes deep breaths with improved pain control throughout the shift  Outcome: Progressing  Goal: Turns in bed with improved pain control throughout the shift  Outcome: Progressing  Goal: Walks with improved pain control throughout the shift  Outcome: Progressing  Goal: Performs ADL's with improved pain control throughout shift  Outcome: Progressing  Goal: Participates in PT with improved pain control throughout the shift  Outcome: Progressing  Goal: Free from opioid side effects throughout the shift  Outcome: Progressing  Goal: Free from  acute confusion related to pain meds throughout the shift  Outcome: Progressing

## 2023-12-25 NOTE — CONSULTS
Infectious Disease Consult    PATIENT NAME: Estephania Hernandez    MRN: [id number]  SERVICE DATE:  12/25/2023   SERVICE TIME:  12:56 PM    SIGNATURE: Cyndie Busby MD    PRIMARY CARE PHYSICIAN: Ramon Long DO  REASON FOR CONSULT: Right lower extremity infection  REQUESTING PHYSICIAN: Dr. Funes      HPI  63-year-old female who is well-known to me for distal medial right lower extremity nonhealing wound with recurrent infections, positive cultures for MRSA, chronic wound VAC in place, presented with erythema and redness that started 2 days ago.  She mentioned chills but no fever.  She is chronically on prednisone which she has been tapering, found to have significant leukocytosis with WBC of 28.6.  Started empirically on vancomycin and Zosyn.  Most recent culture in November from the wound grew stenotrophomonas and MRSA.  Susceptibilities reviewed.  Blood culture was sent this admission.  Wound VAC in place and it was changed the last on 12/23, patient reported appropriate healing with no signs of infection at that time.    PAST MEDICAL HISTORY:   Past Medical History:   Diagnosis Date    Essential (primary) hypertension 12/30/2022    Hypertension, essential, benign     PAST SURGICAL HISTORY:   Past Surgical History:   Procedure Laterality Date    OTHER SURGICAL HISTORY  10/21/2022    Mastectomy    OTHER SURGICAL HISTORY  10/21/2022    Lower leg fracture repair    OTHER SURGICAL HISTORY  10/21/2022    Ankle fracture repair    OTHER SURGICAL HISTORY  10/21/2022    Lower leg fracture repair    OTHER SURGICAL HISTORY  10/21/2022    Colonoscopy    OTHER SURGICAL HISTORY  10/21/2022    Temporal artery biopsy    OTHER SURGICAL HISTORY  10/21/2022    Mastectomy bilateral    OTHER SURGICAL HISTORY  10/21/2022    Appendectomy     FAMILY HISTORY:   Family History   Problem Relation Name Age of Onset    Other (Arteriossclerrosis of nonautologous coronary artery bypass graft without angina pectoris) Sibling       SOCIAL  HISTORY:   Social History     Tobacco Use    Smoking status: Former     Types: Cigarettes     Quit date: 1982     Years since quittin.0    Smokeless tobacco: Never   Vaping Use    Vaping Use: Former   Substance Use Topics    Alcohol use: Yes     Alcohol/week: 3.0 standard drinks of alcohol     Types: 3 Shots of liquor per week    Drug use: Yes     Types: Marijuana     CURRENT ALLERGIES:   Allergies as of 2023 - Reviewed 2023   Allergen Reaction Noted    House dust mite Other, Itching, and Shortness of breath 2019    Mold Cough, Itching, and Shortness of breath 2019    Cat dander Other, Itching, and Swelling 2019    Dog dander Other, Itching, and Swelling 2019    Sutures Itching and Swelling 2019    Egg Other 2023    Corn Other 2019    Doxycycline Rash 2023     MEDICATIONS:    Current Facility-Administered Medications:     amitriptyline (Elavil) tablet 25 mg, 25 mg, oral, Nightly, Howard Funes DO    apixaban (Eliquis) tablet 2.5 mg, 2.5 mg, oral, q12h, Howard Funes DO, 2.5 mg at 23 0652    loratadine (Claritin) tablet 10 mg, 10 mg, oral, Daily, Howard Funes DO, 10 mg at 23 1000    metoprolol tartrate (Lopressor) tablet 50 mg, 50 mg, oral, BID, Howard Funes DO, 50 mg at 23 1001    NIFEdipine ER (Adalat CC) 24 hr tablet 90 mg, 90 mg, oral, Daily before breakfast, Howard Funes DO, 90 mg at 23 0651    pantoprazole (ProtoNix) EC tablet 40 mg, 40 mg, oral, Daily before breakfast, Howard Funes DO, 40 mg at 23 0652    polyethylene glycol (Glycolax, Miralax) packet 17 g, 17 g, oral, Daily, Howard Funes DO, 17 g at 23 1000    predniSONE (Deltasone) tablet 25 mg, 25 mg, oral, Daily, Howard Funes DO, 25 mg at 23 1000    sennosides-docusate sodium (Tracie-Colace) 8.6-50 mg per tablet 2 tablet, 2 tablet, oral, BID, Howard Funes DO, 2 tablet at 23 1001     "vancomycin (Vancocin) placeholder, , miscellaneous, Daily PRN, Howard Funes, DO       COMPLETE REVIEW OF SYSTEMS:    Review of systems shows no findings other than what is described in the narrative above.  Specifically, the patient has had no significant changes in eyesight, no sore throat, no post nasal drip, no ear pains.  There has been no cough or chest pains, pleuritic or otherwise.  There has been no abdominal pain, no nausea or vomiting, nor diarrhea.   There has been no dysuria, urinary frequency, or flank pain.  .   All other systems were reviewed and are negative.        PHYSICAL EXAM:  Patient Vitals for the past 24 hrs:   BP Temp Temp src Pulse Resp SpO2 Height Weight   12/25/23 0800 136/85 36.9 °C (98.4 °F) -- (!) 113 18 100 % -- --   12/25/23 0651 126/79 -- -- (!) 118 -- -- -- --   12/25/23 0130 (!) 160/97 37 °C (98.6 °F) Temporal (!) 121 18 96 % 1.575 m (5' 2\") 75.8 kg (167 lb 3.2 oz)   12/25/23 0100 -- -- -- (!) 120 18 96 % -- --   12/25/23 0047 156/77 -- -- (!) 128 16 96 % -- --   12/25/23 0000 -- -- -- (!) 125 24 90 % -- --   12/24/23 2334 (!) 169/91 -- -- (!) 123 22 91 % -- --   12/24/23 2315 -- -- -- (!) 122 20 93 % -- --   12/24/23 2300 -- -- -- (!) 120 26 97 % -- --   12/24/23 2245 -- -- -- (!) 118 (!) 33 96 % -- --   12/24/23 2230 -- -- -- (!) 121 26 97 % -- --   12/24/23 2215 -- -- -- (!) 120 19 96 % -- --   12/24/23 2200 -- -- -- (!) 119 26 96 % -- --   12/24/23 2145 -- -- -- (!) 118 (!) 28 97 % -- --   12/24/23 2134 (!) 177/108 -- -- (!) 121 18 -- -- --   12/24/23 2130 (!) 177/108 -- -- (!) 121 22 97 % -- --   12/24/23 2105 (!) 169/95 36.5 °C (97.7 °F) Temporal (!) 130 18 99 % 1.575 m (5' 2\") 68 kg (150 lb)     Body mass index is 30.58 kg/m².  Gen: NAD  Neck: symmetric, no mass  Cardiovascular: RRR  Respiratory: No distress   GI: Abd soft, nontender, non-distended  Extremities: Right medial wound with wound VAC in place, surrounding erythema and swelling in the foot    Labs:  Lab " Results   Component Value Date    WBC 28.6 (H) 12/24/2023    HGB 12.9 12/24/2023    HCT 40.8 12/24/2023    MCV 99 12/24/2023     12/24/2023     Lab Results   Component Value Date    GLUCOSE 111 (H) 12/24/2023    CALCIUM 9.9 12/24/2023     12/24/2023    K 3.9 12/24/2023    CO2 23 12/24/2023     12/24/2023    BUN 52 (H) 12/24/2023    CREATININE 2.85 (H) 12/24/2023   ESR: --  Lab Results   Component Value Date    SEDRATE 15 11/01/2023     Lab Results   Component Value Date    CRP 2.32 (H) 11/01/2023     Lab Results   Component Value Date    ALT 16 12/24/2023    AST 15 12/24/2023    ALKPHOS 61 12/24/2023    BILITOT 0.4 12/24/2023       DATA:   Diagnostic tests reviewed for today's visit:    Labs this admission reviewed  Imagings this admission reviewed  Cultures: Reviewed        ASSESSMENT :   -Right lower extremity cellulitis  -Right lower extremity nonhealing wound with wound VAC in place  -Complex history of tibia/fibula fracture with hardware in place  -Chronic kidney disease  -Leukocytosis secondary to infection and steroid use  -History of hypertension, CHF, GCA on chronic prednisone, PE    PLAN:  -No signs of wound infection this admission but definitely soft tissue infection surrounding the wound VAC and the foot  -Doubt Stenotrophomonas contributing to his current infection more likely MRSA to be the primary pathogen  -The significant leukocytosis is out of proportion of the soft tissue infection and I think majority of it from the steroid use  -Continue vancomycin dosing per pharmacy  -Discontinue Zosyn    Will continue to follow     Thank you so much for this consultation     Cyndie Busby MD.   Infectious Disease Attending

## 2023-12-25 NOTE — CARE PLAN
Patient seen and examined this morning, patient known to me from previous admission, right lower extremity definitely looks swollen with erythema and mild tenderness around the ankles concerning for cellulitis.  Patient was seen by infectious disease this morning, recommend to discontinue Zosyn and continue vancomycin with close monitoring.  Patient has no other complaints at the time of evaluation.

## 2023-12-25 NOTE — NURSING NOTE
1509: Additional 21mg home dose of prednisone added at this time per pharmacy. 25mg dose given this morning as ordered. This RN reached out to Dr. Ziegler to clarify if additional 21mg should be given to pt. At this time. Per Dr. Ziegler hold additional 21mg added dose, per MD pt. Will receive that dose tomorrow am and no additional to be given this shift.       1742:Dr. Ziegler notified of 12/24 Blood culture positive for gram negative bacilli      EOS: Pt. Stable throughout this shift and was able to have an uneventful day. Pt. Had no acute changes throughout this shift. Pt. Given IV zosyn and prednisone this shift with no complications. Pt.  at bedside this shift. Pt. Wound vac maintained throughout shift. Pt. Resting intermittently. Family at bedside at this time. Call light within reach. Bed alarm on.

## 2023-12-26 ENCOUNTER — APPOINTMENT (OUTPATIENT)
Dept: WOUND CARE | Facility: CLINIC | Age: 63
End: 2023-12-26
Payer: MEDICAID

## 2023-12-26 LAB
ANION GAP SERPL CALC-SCNC: 15 MMOL/L (ref 10–20)
BUN SERPL-MCNC: 28 MG/DL (ref 6–23)
CALCIUM SERPL-MCNC: 9.1 MG/DL (ref 8.6–10.3)
CHLORIDE SERPL-SCNC: 104 MMOL/L (ref 98–107)
CO2 SERPL-SCNC: 25 MMOL/L (ref 21–32)
CREAT SERPL-MCNC: 2.37 MG/DL (ref 0.5–1.05)
ERYTHROCYTE [DISTWIDTH] IN BLOOD BY AUTOMATED COUNT: 14.4 % (ref 11.5–14.5)
GFR SERPL CREATININE-BSD FRML MDRD: 23 ML/MIN/1.73M*2
GLUCOSE SERPL-MCNC: 89 MG/DL (ref 74–99)
HCT VFR BLD AUTO: 35.8 % (ref 36–46)
HGB BLD-MCNC: 11 G/DL (ref 12–16)
MAGNESIUM SERPL-MCNC: 1.95 MG/DL (ref 1.6–2.4)
MCH RBC QN AUTO: 31.5 PG (ref 26–34)
MCHC RBC AUTO-ENTMCNC: 30.7 G/DL (ref 32–36)
MCV RBC AUTO: 103 FL (ref 80–100)
NRBC BLD-RTO: 0 /100 WBCS (ref 0–0)
PLATELET # BLD AUTO: 283 X10*3/UL (ref 150–450)
POTASSIUM SERPL-SCNC: 3.6 MMOL/L (ref 3.5–5.3)
RBC # BLD AUTO: 3.49 X10*6/UL (ref 4–5.2)
SODIUM SERPL-SCNC: 140 MMOL/L (ref 136–145)
WBC # BLD AUTO: 14.5 X10*3/UL (ref 4.4–11.3)

## 2023-12-26 PROCEDURE — 87070 CULTURE OTHR SPECIMN AEROBIC: CPT | Mod: STJLAB | Performed by: STUDENT IN AN ORGANIZED HEALTH CARE EDUCATION/TRAINING PROGRAM

## 2023-12-26 PROCEDURE — 36415 COLL VENOUS BLD VENIPUNCTURE: CPT | Performed by: STUDENT IN AN ORGANIZED HEALTH CARE EDUCATION/TRAINING PROGRAM

## 2023-12-26 PROCEDURE — 87040 BLOOD CULTURE FOR BACTERIA: CPT | Mod: STJLAB

## 2023-12-26 PROCEDURE — 2500000004 HC RX 250 GENERAL PHARMACY W/ HCPCS (ALT 636 FOR OP/ED): Performed by: STUDENT IN AN ORGANIZED HEALTH CARE EDUCATION/TRAINING PROGRAM

## 2023-12-26 PROCEDURE — 83735 ASSAY OF MAGNESIUM: CPT | Performed by: STUDENT IN AN ORGANIZED HEALTH CARE EDUCATION/TRAINING PROGRAM

## 2023-12-26 PROCEDURE — 36415 COLL VENOUS BLD VENIPUNCTURE: CPT

## 2023-12-26 PROCEDURE — 1100000001 HC PRIVATE ROOM DAILY

## 2023-12-26 PROCEDURE — 85027 COMPLETE CBC AUTOMATED: CPT | Performed by: STUDENT IN AN ORGANIZED HEALTH CARE EDUCATION/TRAINING PROGRAM

## 2023-12-26 PROCEDURE — 2500000001 HC RX 250 WO HCPCS SELF ADMINISTERED DRUGS (ALT 637 FOR MEDICARE OP): Performed by: INTERNAL MEDICINE

## 2023-12-26 PROCEDURE — 99232 SBSQ HOSP IP/OBS MODERATE 35: CPT | Performed by: STUDENT IN AN ORGANIZED HEALTH CARE EDUCATION/TRAINING PROGRAM

## 2023-12-26 PROCEDURE — 2500000004 HC RX 250 GENERAL PHARMACY W/ HCPCS (ALT 636 FOR OP/ED): Performed by: INTERNAL MEDICINE

## 2023-12-26 PROCEDURE — 2500000001 HC RX 250 WO HCPCS SELF ADMINISTERED DRUGS (ALT 637 FOR MEDICARE OP): Performed by: STUDENT IN AN ORGANIZED HEALTH CARE EDUCATION/TRAINING PROGRAM

## 2023-12-26 PROCEDURE — 80048 BASIC METABOLIC PNL TOTAL CA: CPT | Performed by: STUDENT IN AN ORGANIZED HEALTH CARE EDUCATION/TRAINING PROGRAM

## 2023-12-26 RX ORDER — LINEZOLID 600 MG/1
600 TABLET, FILM COATED ORAL EVERY 12 HOURS SCHEDULED
Status: DISCONTINUED | OUTPATIENT
Start: 2023-12-26 | End: 2023-12-27 | Stop reason: HOSPADM

## 2023-12-26 RX ORDER — VANCOMYCIN HYDROCHLORIDE 1 G/200ML
1000 INJECTION, SOLUTION INTRAVENOUS ONCE
Status: COMPLETED | OUTPATIENT
Start: 2023-12-26 | End: 2023-12-26

## 2023-12-26 RX ORDER — CIPROFLOXACIN 500 MG/1
500 TABLET ORAL EVERY 12 HOURS SCHEDULED
Status: DISCONTINUED | OUTPATIENT
Start: 2023-12-26 | End: 2023-12-27

## 2023-12-26 RX ADMIN — LINEZOLID 600 MG: 600 TABLET, FILM COATED ORAL at 21:25

## 2023-12-26 RX ADMIN — CIPROFLOXACIN 500 MG: 500 TABLET, FILM COATED ORAL at 21:26

## 2023-12-26 RX ADMIN — APIXABAN 2.5 MG: 2.5 TABLET, FILM COATED ORAL at 21:26

## 2023-12-26 RX ADMIN — METOPROLOL TARTRATE 50 MG: 50 TABLET, FILM COATED ORAL at 21:26

## 2023-12-26 RX ADMIN — PANTOPRAZOLE SODIUM 40 MG: 40 TABLET, DELAYED RELEASE ORAL at 06:33

## 2023-12-26 RX ADMIN — POLYETHYLENE GLYCOL 3350 17 G: 17 POWDER, FOR SOLUTION ORAL at 09:02

## 2023-12-26 RX ADMIN — METOPROLOL TARTRATE 50 MG: 50 TABLET, FILM COATED ORAL at 09:03

## 2023-12-26 RX ADMIN — LORATADINE 10 MG: 10 TABLET ORAL at 09:03

## 2023-12-26 RX ADMIN — PREDNISONE 1 MG: 1 TABLET ORAL at 09:02

## 2023-12-26 RX ADMIN — NIFEDIPINE 90 MG: 60 TABLET, FILM COATED, EXTENDED RELEASE ORAL at 09:02

## 2023-12-26 RX ADMIN — SENNOSIDES AND DOCUSATE SODIUM 2 TABLET: 8.6; 5 TABLET ORAL at 09:03

## 2023-12-26 RX ADMIN — PREDNISONE 20 MG: 20 TABLET ORAL at 09:05

## 2023-12-26 RX ADMIN — ASPIRIN 81 MG: 81 TABLET, COATED ORAL at 09:03

## 2023-12-26 RX ADMIN — ACETAMINOPHEN 650 MG: 325 TABLET ORAL at 15:15

## 2023-12-26 RX ADMIN — PIPERACILLIN SODIUM AND TAZOBACTAM SODIUM 4.5 G: 4; .5 INJECTION, SOLUTION INTRAVENOUS at 14:35

## 2023-12-26 RX ADMIN — PIPERACILLIN SODIUM AND TAZOBACTAM SODIUM 3.38 G: 3; .375 INJECTION, SOLUTION INTRAVENOUS at 06:33

## 2023-12-26 RX ADMIN — SENNOSIDES AND DOCUSATE SODIUM 2 TABLET: 8.6; 5 TABLET ORAL at 21:26

## 2023-12-26 RX ADMIN — LINEZOLID 600 MG: 600 TABLET, FILM COATED ORAL at 15:15

## 2023-12-26 RX ADMIN — VANCOMYCIN HYDROCHLORIDE 1000 MG: 1 INJECTION, SOLUTION INTRAVENOUS at 11:03

## 2023-12-26 RX ADMIN — APIXABAN 2.5 MG: 2.5 TABLET, FILM COATED ORAL at 09:03

## 2023-12-26 ASSESSMENT — COGNITIVE AND FUNCTIONAL STATUS - GENERAL
CLIMB 3 TO 5 STEPS WITH RAILING: A LOT
MOBILITY SCORE: 16
DRESSING REGULAR UPPER BODY CLOTHING: A LITTLE
STANDING UP FROM CHAIR USING ARMS: A LITTLE
DRESSING REGULAR LOWER BODY CLOTHING: A LITTLE
HELP NEEDED FOR BATHING: A LITTLE
TURNING FROM BACK TO SIDE WHILE IN FLAT BAD: A LITTLE
TOILETING: A LITTLE
DAILY ACTIVITIY SCORE: 19
WALKING IN HOSPITAL ROOM: A LOT
MOVING FROM LYING ON BACK TO SITTING ON SIDE OF FLAT BED WITH BEDRAILS: A LITTLE
PERSONAL GROOMING: A LITTLE
MOVING TO AND FROM BED TO CHAIR: A LITTLE

## 2023-12-26 ASSESSMENT — PAIN SCALES - GENERAL
PAINLEVEL_OUTOF10: 3
PAINLEVEL_OUTOF10: 0 - NO PAIN

## 2023-12-26 ASSESSMENT — PAIN - FUNCTIONAL ASSESSMENT
PAIN_FUNCTIONAL_ASSESSMENT: 0-10
PAIN_FUNCTIONAL_ASSESSMENT: 0-10

## 2023-12-26 ASSESSMENT — PAIN DESCRIPTION - LOCATION: LOCATION: HAND

## 2023-12-26 NOTE — PROGRESS NOTES
"Estephania Hernandez is a 63 y.o. female on day 1 of admission presenting with Cellulitis of right leg.    Subjective   No acute events overnight  Seen and examined this morning. Feels well overall. Improved pain and edema in RLE. Wound vac changed 12/16. She is without fevers or chills.     Objective     Physical Exam  Gen: NAD  Neck: symmetric, no mass  Cardiovascular: RRR  Respiratory: No distress   GI: Abd soft, nontender, non-distended  Extremities: Right medial wound with wound VAC in place, surrounding erythema and swelling in the foot with skin wrinkling     Last Recorded Vitals  Blood pressure 110/70, pulse 88, temperature 36.1 °C (97 °F), temperature source Temporal, resp. rate 16, height 1.575 m (5' 2\"), weight 75.8 kg (167 lb 3.2 oz), SpO2 95 %.  Intake/Output last 3 Shifts:  I/O last 3 completed shifts:  In: 870 (11.5 mL/kg) [P.O.:720; IV Piggyback:150]  Out: 1150 (15.2 mL/kg) [Urine:1150 (0.4 mL/kg/hr)]  Weight: 75.8 kg     Relevant Results  Scheduled medications  [Held by provider] amitriptyline, 25 mg, oral, Nightly  apixaban, 2.5 mg, oral, BID  aspirin, 81 mg, oral, Daily  linezolid, 600 mg, oral, q12h AJ  loratadine, 10 mg, oral, Daily  metoprolol tartrate, 50 mg, oral, BID  NIFEdipine ER, 90 mg, oral, BID  pantoprazole, 40 mg, oral, Daily before breakfast  piperacillin-tazobactam, 4.5 g, intravenous, q8h  polyethylene glycol, 17 g, oral, Daily  predniSONE, 1 mg, oral, Daily  predniSONE, 20 mg, oral, Daily  sennosides-docusate sodium, 2 tablet, oral, BID      Continuous medications     PRN medications  PRN medications: acetaminophen, ondansetron ODT  Results from last 7 days   Lab Units 12/26/23  0639 12/24/23  2128   WBC AUTO x10*3/uL 14.5* 28.6*   RBC AUTO x10*6/uL 3.49* 4.11   HEMOGLOBIN g/dL 11.0* 12.9     Results from last 7 days   Lab Units 12/26/23  0639 12/24/23  2128   SODIUM mmol/L 140 140   POTASSIUM mmol/L 3.6 3.9   CHLORIDE mmol/L 104 101   CO2 mmol/L 25 23   BUN mg/dL 28* 52*   CREATININE " mg/dL 2.37* 2.85*   CALCIUM mg/dL 9.1 9.9   MAGNESIUM mg/dL 1.95  --    BILIRUBIN TOTAL mg/dL  --  0.4   ALT U/L  --  16   AST U/L  --  15       Assessment/Plan   Principal Problem:    Cellulitis of right leg  Active Problems:    Hypertension, essential, benign    Chronic diastolic CHF (congestive heart failure) (CMS/HCC)    GCA (giant cell arteritis) (CMS/[facility])    History of breast cancer    Takotsubo cardiomyopathy    PMR (polymyalgia rheumatica) (CMS/[facility])    Stage 4 chronic kidney disease (CMS/[facility])    GERD (gastroesophageal reflux disease)    History of pulmonary embolism    Resistant hypertension    Overweight with body mass index (BMI) of 28 to 28.9 in adult    Assessment  -Right lower extremity cellulitis  -Pseudomonas bacteremia  -Right lower extremity nonhealing wound with wound VAC in place  -Complex history of tibia/fibula fracture with hardware in place  -Chronic kidney disease  -Leukocytosis secondary to infection and steroid use  -History of hypertension, CHF, GCA on chronic prednisone, PE     PLAN:  -Leukocytosis improving and afebrile ON  -Aerobic blood culture positive for pseudomonas aeruginosa  -Continue zosyn at 4.5g dosing  -Discontinue vancomycin  -Start Linezolid (given c/f superimposed MRSA given h/o)-- recommend discontinuing amitriptyline if tolerated given c/f possible serotonin syndrome development     -Repeat blood cultures  -Will need Shen line in upcoming days for long-term IV antibiotics    Case discussed with attending physician,    Landon Taylor, DO  PGY-2 Internal Medicine

## 2023-12-26 NOTE — CONSULTS
Wound Care Consult     Visit Date: 12/26/2023      Patient Name: Estephania Hernandez         MRN: [id number]           YOB: 1960     Reason for Consult: Wound VAC        Wound History: Nonhealing Wound      Pertinent Labs:   Albumin   Date Value Ref Range Status   12/24/2023 4.6 3.4 - 5.0 g/dL Final       Wound Assessment:  Wound 10/27/23 Pretibial Distal;Right;Lower (Active)   Wound Image    12/25/23 0152   Site Assessment Clean;Granulation;Red 12/26/23 1113   Shelbi-Wound Assessment Intact 12/26/23 1113   Wound Length (cm) 3 cm 12/26/23 1113   Wound Width (cm) 0.3 cm 12/26/23 1113   Wound Surface Area (cm^2) 0.9 cm^2 12/26/23 1113   Wound Depth (cm) 0.6 cm 12/26/23 1113   Wound Volume (cm^3) 0.54 cm^3 12/26/23 1113   Wound Healing % 57 12/26/23 1113   Wound Bed Granulation (%) 100 % 12/26/23 1113   Margins Well-defined edges 12/26/23 1113   Drainage Description Serous;Yellow 12/26/23 1113   Drainage Amount Scant 12/26/23 1113   Dressing Vacuum dressing 12/26/23 1113   Dressing Changed Changed 12/26/23 1113   Dressing Status Clean;Dry 12/26/23 1113   Dressing Status Intact 12/26/23 1113       Wound Team Summary Assessment: Patient being seen for Wound VAC dressing change. Patient states that Dr. Reyes at [facility] is managing wound and HBO treatments. Dressing removed, cleanse with NS, pat dry. Dr. Ziegler in, ordered culture, culture obtained. Wound Care - cleanse with NS, pat dry. Skin prep to shelbi wound tissue, drape to wound edges. One piece of black foam to wound bed, one piece of black foam placed on top for trac pad. Trac pad placed and connected to 150 mmHg intermittent negative pressure. Per patient VAC is change every Tuesday, Thursday, and Saturday, and pressure at 150 mmHg intermittent, 10 minutes on and 2 minutes off. VAC settings set accordingly.      Wound Team Plan: Will follow for dressing changes.      Glo Thacker RN  12/26/2023  11:19 AM

## 2023-12-26 NOTE — CARE PLAN
Problem: Fall/Injury  Goal: Verbalize understanding of personal risk factors for fall in the hospital  Outcome: Progressing  Goal: Verbalize understanding of risk factor reduction measures to prevent injury from fall in the home  Outcome: Progressing     Problem: Skin  Goal: Decreased wound size/increased tissue granulation at next dressing change  Outcome: Progressing  Goal: Participates in plan/prevention/treatment measures  Outcome: Progressing  Goal: Prevent/manage excess moisture  Outcome: Progressing  Goal: Prevent/minimize sheer/friction injuries  Outcome: Progressing  Goal: Promote/optimize nutrition  Outcome: Progressing  Goal: Promote skin healing  Outcome: Progressing     Problem: Pain  Goal: Participates in PT with improved pain control throughout the shift  Outcome: Progressing  Goal: Free from opioid side effects throughout the shift  Outcome: Progressing  Goal: Free from acute confusion related to pain meds throughout the shift  Outcome: Progressing     Problem: Pain - Adult  Goal: Verbalizes/displays adequate comfort level or baseline comfort level  Outcome: Progressing     Problem: Safety - Adult  Goal: Free from fall injury  Outcome: Progressing     Problem: Discharge Planning  Goal: Discharge to home or other facility with appropriate resources  Outcome: Progressing     Problem: Chronic Conditions and Co-morbidities  Goal: Patient's chronic conditions and co-morbidity symptoms are monitored and maintained or improved  Outcome: Progressing     Problem: Diabetes  Goal: Achieve decreasing blood glucose levels by end of shift  Outcome: Progressing  Goal: Increase stability of blood glucose readings by end of shift  Outcome: Progressing  Goal: Decrease in ketones present in urine by end of shift  Outcome: Progressing  Goal: Maintain electrolyte levels within acceptable range throughout shift  Outcome: Progressing  Goal: Maintain glucose levels >70mg/dl to <250mg/dl throughout shift  Outcome:  Progressing  Goal: No changes in neurological exam by end of shift  Outcome: Progressing  Goal: Learn about and adhere to nutrition recommendations by end of shift  Outcome: Progressing  Goal: Vital signs within normal range for age by end of shift  Outcome: Progressing  Goal: Increase self care and/or family involovement by end of shift  Outcome: Progressing  Goal: Receive DSME education by end of shift  Outcome: Progressing     Problem: Nutrition  Goal: Less than 5 days NPO/clear liquids  Outcome: Progressing  Goal: Oral intake greater than 50%  Outcome: Progressing  Goal: Oral intake greater 75%  Outcome: Progressing  Goal: Consume prescribed supplement  Outcome: Progressing  Goal: Adequate PO fluid intake  Outcome: Progressing  Goal: Nutrition support goals are met within 48 hrs  Outcome: Progressing  Goal: Nutrition support is meeting 75% of nutrient needs  Outcome: Progressing  Goal: Tube feed tolerance  Outcome: Progressing  Goal: BG  mg/dL  Outcome: Progressing  Goal: Lab values WNL  Outcome: Progressing  Goal: Electrolytes WNL  Outcome: Progressing  Goal: Promote healing  Outcome: Progressing  Goal: Maintain stable weight  Outcome: Progressing  Goal: Reduce weight from edema/fluid  Outcome: Progressing  Goal: Gradual weight gain  Outcome: Progressing  Goal: Improve ostomy output  Outcome: Progressing

## 2023-12-26 NOTE — PROGRESS NOTES
Vancomycin Dosing by Pharmacy- Cessation of Therapy    Consult to pharmacy for vancomycin dosing has been discontinued by the prescriber, pharmacy will sign off at this time.    Please call pharmacy if there are further questions or re-enter a consult if vancomycin is resumed.     Oly Mc, [facility]

## 2023-12-26 NOTE — PROGRESS NOTES
"Vancomycin Dosing by Pharmacy- FOLLOW UP    Estephania Hernandez is a 63 y.o. year old female who Pharmacy has been consulted for vancomycin dosing for cellulitis, skin and soft tissue. Based on the patient's indication and renal status this patient is being dosed based on a goal trough/random level of 10-15.     Renal function is currently stable.    Current vancomycin dose: 2000 mg x 1 dose  Most recent trough level: 18.7 mcg/mL    Visit Vitals  /70 (BP Location: Left arm, Patient Position: Lying)   Pulse 88   Temp 36.1 °C (97 °F) (Temporal)   Resp 16        Lab Results   Component Value Date    CREATININE 2.37 (H) 12/26/2023    CREATININE 2.85 (H) 12/24/2023    CREATININE 2.37 (H) 10/30/2023    CREATININE 2.28 (H) 10/29/2023        Patient weight is No results found for: \"PTWEIGHT\"    No results found for: \"CULTURE\"     I/O last 3 completed shifts:  In: 870 (11.5 mL/kg) [P.O.:720; IV Piggyback:150]  Out: 1150 (15.2 mL/kg) [Urine:1150 (0.4 mL/kg/hr)]  Weight: 75.8 kg   [unfilled]    Lab Results   Component Value Date    PATIENTTEMP 37.0 05/20/2023    PATIENTTEMP 37.0 05/20/2023    PATIENTTEMP 37.0 05/19/2023        Assessment/Plan    Above goal random/trough level. Orders placed for new vancomycin regimen 1000 mg x1 dose to begin at 12/26  The next level will be obtained on 12/27 at 0500. May be obtained sooner if clinically indicated.   Will continue to monitor renal function daily while on vancomycin and order serum creatinine at least every 48 hours if not already ordered.  Follow for continued vancomycin needs, clinical response, and signs/symptoms of toxicity.       Oly Mc [facility]           "

## 2023-12-26 NOTE — PROGRESS NOTES
PHARMACIST CONSULT  RENAL DOSING ADJUSTMENT    Patient Name: Estephania Hernandez  MRN: [id number]  Admission Date: 12/24/2023  9:11 PM  Date/Time of Consult: 12/26/23 at 10:06 AM    In accordance with the inpatient pharmacy renal dose adjustment consult agreement the following medication changes have been made:  Antimicrobial: currently ordered Zosyn 3.375g q12hr, will be adjusted to Zosyn 3.375g q8hr    Indication if pertinent for dosing: CrCl now > 20 ml/min (currently 23.5 ml/min)    Results from last 72 hours   Lab Units 12/26/23  0639 12/24/23  2128   CREATININE mg/dL 2.37* 2.85*   BUN mg/dL 28* 52*       Serum creatinine: 2.37 mg/dL (H) 12/26/23 0639  Estimated creatinine clearance: 23.5 mL/min (A)      Per consult agreement, pharmacy will order related labs, evaluate results, and adjust dosing as it pertains to the drug therapy being managed.  Thank you for allowing me to participate in the care for this patient.    Signature: Lizy William, PharmD  12/26/23 at 10:06 AM

## 2023-12-26 NOTE — PROGRESS NOTES
"Estephania Hernandez is a 63 y.o. female on day 1 of admission presenting with Cellulitis of right leg.    Subjective   No acute events overnight. Patient seen and examined with Klarissa from wound care today, wound vac was removed, no drainage noted. Culture sent from wound.        Objective     Constitutional: Well developed, awake/alert/oriented x3, no distress, alert and cooperative  Eyes: PERRL, EOMI, clear sclera  ENMT: mucous membranes moist  Head/Neck: Neck supple  Respiratory/Thorax: CTA b/l.   Cardiovascular: Regular, rate and rhythm, no murmurs  Gastrointestinal: Nondistended, soft, non-tender  Musculoskeletal: ROM intact  Extremities: swelling / erythema / warmth in the LLE below the knee down to foot, minimal tenderness on exam, wound vac in place.       Last Recorded Vitals  Blood pressure 110/70, pulse 88, temperature 36.1 °C (97 °F), temperature source Temporal, resp. rate 16, height 1.575 m (5' 2\"), weight 75.8 kg (167 lb 3.2 oz), SpO2 95 %.  Intake/Output last 3 Shifts:  I/O last 3 completed shifts:  In: 870 (11.5 mL/kg) [P.O.:720; IV Piggyback:150]  Out: 1150 (15.2 mL/kg) [Urine:1150 (0.4 mL/kg/hr)]  Weight: 75.8 kg     Relevant Results  Scheduled medications  amitriptyline, 25 mg, oral, Nightly  apixaban, 2.5 mg, oral, BID  aspirin, 81 mg, oral, Daily  loratadine, 10 mg, oral, Daily  metoprolol tartrate, 50 mg, oral, BID  NIFEdipine ER, 90 mg, oral, BID  pantoprazole, 40 mg, oral, Daily before breakfast  piperacillin-tazobactam, 3.375 g, intravenous, q8h  polyethylene glycol, 17 g, oral, Daily  predniSONE, 1 mg, oral, Daily  predniSONE, 20 mg, oral, Daily  sennosides-docusate sodium, 2 tablet, oral, BID  vancomycin, 1,000 mg, intravenous, Once      Continuous medications     PRN medications  PRN medications: acetaminophen, ondansetron ODT, vancomycin  Results from last 7 days   Lab Units 12/26/23  0639 12/24/23  2128   WBC AUTO x10*3/uL 14.5* 28.6*   RBC AUTO x10*6/uL 3.49* 4.11   HEMOGLOBIN g/dL 11.0* " 12.9     Results from last 7 days   Lab Units 12/26/23  0639 12/24/23  2128   SODIUM mmol/L 140 140   POTASSIUM mmol/L 3.6 3.9   CHLORIDE mmol/L 104 101   CO2 mmol/L 25 23   BUN mg/dL 28* 52*   CREATININE mg/dL 2.37* 2.85*   CALCIUM mg/dL 9.1 9.9   MAGNESIUM mg/dL 1.95  --    BILIRUBIN TOTAL mg/dL  --  0.4   ALT U/L  --  16   AST U/L  --  15         Assessment/Plan   Principal Problem:    Cellulitis of right leg  Active Problems:    Hypertension, essential, benign    Chronic diastolic CHF (congestive heart failure) (CMS/HCC)    GCA (giant cell arteritis) (CMS/HCC)    History of breast cancer    Takotsubo cardiomyopathy    PMR (polymyalgia rheumatica) (CMS/HCC)    Stage 4 chronic kidney disease (CMS/HCC)    GERD (gastroesophageal reflux disease)    History of pulmonary embolism    Resistant hypertension    Overweight with body mass index (BMI) of 28 to 28.9 in adult    Plan  -continue vanc/zosyn for now due to blood culture growing pseudomona, continue to follow for final culture  -ID on consult  -wound culture sent on 12/26  -Continue home Procardia, metoprolol  -Continue home prednisone daily for PMR  -Bowel regimen with Senokot and MiraLAX  -Continue home amitriptyline  -Continue home Eliquis 2.5 mg twice daily renally dosed  -Continue home Protonix          I spent 35 minutes in the professional and overall care of this patient.      Syed Ziegler MD

## 2023-12-26 NOTE — NURSING NOTE
0700: Assumed care of pt. At this time    0900: Wound care RN at the bedside to change wound vac at this time. Pt. Tolerated well. Outline is marked for cellulitis site. Pt. Denies any pain while resting in the bed.  at the bedside.    1600: Pt. Resting and denies any needs at this time. The pt. Has call light in reach and has friends at the bedside. Will follow throughout this shift.    1830: Pt. Resting comfortably and does not have any reports of pain until she gets up. The pt. Planned to have rizzo placement for LT ATB in the coming days per ID. The pt. Is aware and familiar as she has had a PICC line in the past. Will continue to follow this pt. Throughout this shift. Call light in reach and bed in lowest position.

## 2023-12-27 ENCOUNTER — PHARMACY VISIT (OUTPATIENT)
Dept: PHARMACY | Facility: CLINIC | Age: 63
End: 2023-12-27
Payer: MEDICAID

## 2023-12-27 ENCOUNTER — APPOINTMENT (OUTPATIENT)
Dept: WOUND CARE | Facility: CLINIC | Age: 63
End: 2023-12-27
Payer: MEDICAID

## 2023-12-27 VITALS
HEIGHT: 62 IN | OXYGEN SATURATION: 95 % | DIASTOLIC BLOOD PRESSURE: 107 MMHG | HEART RATE: 85 BPM | WEIGHT: 167.2 LBS | TEMPERATURE: 96.8 F | RESPIRATION RATE: 18 BRPM | BODY MASS INDEX: 30.77 KG/M2 | SYSTOLIC BLOOD PRESSURE: 180 MMHG

## 2023-12-27 LAB
ANION GAP SERPL CALC-SCNC: 16 MMOL/L (ref 10–20)
BUN SERPL-MCNC: 28 MG/DL (ref 6–23)
CALCIUM SERPL-MCNC: 9.3 MG/DL (ref 8.6–10.3)
CHLORIDE SERPL-SCNC: 104 MMOL/L (ref 98–107)
CO2 SERPL-SCNC: 24 MMOL/L (ref 21–32)
CREAT SERPL-MCNC: 2.29 MG/DL (ref 0.5–1.05)
ERYTHROCYTE [DISTWIDTH] IN BLOOD BY AUTOMATED COUNT: 14 % (ref 11.5–14.5)
GFR SERPL CREATININE-BSD FRML MDRD: 24 ML/MIN/1.73M*2
GLUCOSE SERPL-MCNC: 84 MG/DL (ref 74–99)
HCT VFR BLD AUTO: 32.5 % (ref 36–46)
HGB BLD-MCNC: 10 G/DL (ref 12–16)
MAGNESIUM SERPL-MCNC: 1.97 MG/DL (ref 1.6–2.4)
MCH RBC QN AUTO: 31.3 PG (ref 26–34)
MCHC RBC AUTO-ENTMCNC: 30.8 G/DL (ref 32–36)
MCV RBC AUTO: 102 FL (ref 80–100)
NRBC BLD-RTO: 0 /100 WBCS (ref 0–0)
PLATELET # BLD AUTO: 281 X10*3/UL (ref 150–450)
POTASSIUM SERPL-SCNC: 3.5 MMOL/L (ref 3.5–5.3)
RBC # BLD AUTO: 3.2 X10*6/UL (ref 4–5.2)
SODIUM SERPL-SCNC: 140 MMOL/L (ref 136–145)
WBC # BLD AUTO: 11.9 X10*3/UL (ref 4.4–11.3)

## 2023-12-27 PROCEDURE — 99239 HOSP IP/OBS DSCHRG MGMT >30: CPT | Performed by: STUDENT IN AN ORGANIZED HEALTH CARE EDUCATION/TRAINING PROGRAM

## 2023-12-27 PROCEDURE — 2500000001 HC RX 250 WO HCPCS SELF ADMINISTERED DRUGS (ALT 637 FOR MEDICARE OP): Performed by: INTERNAL MEDICINE

## 2023-12-27 PROCEDURE — 80048 BASIC METABOLIC PNL TOTAL CA: CPT | Performed by: STUDENT IN AN ORGANIZED HEALTH CARE EDUCATION/TRAINING PROGRAM

## 2023-12-27 PROCEDURE — 85027 COMPLETE CBC AUTOMATED: CPT | Performed by: STUDENT IN AN ORGANIZED HEALTH CARE EDUCATION/TRAINING PROGRAM

## 2023-12-27 PROCEDURE — 2500000004 HC RX 250 GENERAL PHARMACY W/ HCPCS (ALT 636 FOR OP/ED): Performed by: STUDENT IN AN ORGANIZED HEALTH CARE EDUCATION/TRAINING PROGRAM

## 2023-12-27 PROCEDURE — 2500000005 HC RX 250 GENERAL PHARMACY W/O HCPCS: Performed by: STUDENT IN AN ORGANIZED HEALTH CARE EDUCATION/TRAINING PROGRAM

## 2023-12-27 PROCEDURE — 83735 ASSAY OF MAGNESIUM: CPT | Performed by: STUDENT IN AN ORGANIZED HEALTH CARE EDUCATION/TRAINING PROGRAM

## 2023-12-27 PROCEDURE — 36415 COLL VENOUS BLD VENIPUNCTURE: CPT | Performed by: STUDENT IN AN ORGANIZED HEALTH CARE EDUCATION/TRAINING PROGRAM

## 2023-12-27 PROCEDURE — 2500000001 HC RX 250 WO HCPCS SELF ADMINISTERED DRUGS (ALT 637 FOR MEDICARE OP): Performed by: STUDENT IN AN ORGANIZED HEALTH CARE EDUCATION/TRAINING PROGRAM

## 2023-12-27 PROCEDURE — RXMED WILLOW AMBULATORY MEDICATION CHARGE

## 2023-12-27 PROCEDURE — 97165 OT EVAL LOW COMPLEX 30 MIN: CPT | Mod: GO

## 2023-12-27 RX ORDER — CIPROFLOXACIN 500 MG/1
500 TABLET ORAL EVERY 24 HOURS
Qty: 13 TABLET | Refills: 0 | Status: SHIPPED | OUTPATIENT
Start: 2023-12-28 | End: 2023-12-27 | Stop reason: SDUPTHER

## 2023-12-27 RX ORDER — CIPROFLOXACIN 500 MG/1
500 TABLET ORAL EVERY 24 HOURS
Qty: 13 TABLET | Refills: 0 | Status: SHIPPED | OUTPATIENT
Start: 2023-12-28 | End: 2024-01-10

## 2023-12-27 RX ORDER — LINEZOLID 600 MG/1
600 TABLET, FILM COATED ORAL EVERY 12 HOURS SCHEDULED
Qty: 25 TABLET | Refills: 0 | Status: SHIPPED | OUTPATIENT
Start: 2023-12-27 | End: 2024-01-09

## 2023-12-27 RX ORDER — LINEZOLID 600 MG/1
600 TABLET, FILM COATED ORAL EVERY 12 HOURS SCHEDULED
Qty: 25 TABLET | Refills: 0 | Status: SHIPPED | OUTPATIENT
Start: 2023-12-27 | End: 2023-12-27 | Stop reason: SDUPTHER

## 2023-12-27 RX ORDER — CIPROFLOXACIN 500 MG/1
500 TABLET ORAL EVERY 24 HOURS
Status: DISCONTINUED | OUTPATIENT
Start: 2023-12-28 | End: 2023-12-27 | Stop reason: HOSPADM

## 2023-12-27 RX ADMIN — METOPROLOL TARTRATE 50 MG: 50 TABLET, FILM COATED ORAL at 09:21

## 2023-12-27 RX ADMIN — ONDANSETRON 4 MG: 4 TABLET, ORALLY DISINTEGRATING ORAL at 06:09

## 2023-12-27 RX ADMIN — ACETAMINOPHEN 650 MG: 325 TABLET ORAL at 15:31

## 2023-12-27 RX ADMIN — SENNOSIDES AND DOCUSATE SODIUM 2 TABLET: 8.6; 5 TABLET ORAL at 09:20

## 2023-12-27 RX ADMIN — LINEZOLID 600 MG: 600 TABLET, FILM COATED ORAL at 09:20

## 2023-12-27 RX ADMIN — PREDNISONE 20 MG: 20 TABLET ORAL at 09:20

## 2023-12-27 RX ADMIN — LORATADINE 10 MG: 10 TABLET ORAL at 09:21

## 2023-12-27 RX ADMIN — PREDNISONE 1 MG: 1 TABLET ORAL at 09:22

## 2023-12-27 RX ADMIN — ASPIRIN 81 MG: 81 TABLET, COATED ORAL at 09:21

## 2023-12-27 RX ADMIN — CIPROFLOXACIN 500 MG: 500 TABLET, FILM COATED ORAL at 09:21

## 2023-12-27 RX ADMIN — APIXABAN 2.5 MG: 2.5 TABLET, FILM COATED ORAL at 09:21

## 2023-12-27 RX ADMIN — NIFEDIPINE 90 MG: 60 TABLET, FILM COATED, EXTENDED RELEASE ORAL at 09:20

## 2023-12-27 RX ADMIN — PANTOPRAZOLE SODIUM 40 MG: 40 TABLET, DELAYED RELEASE ORAL at 06:08

## 2023-12-27 SDOH — ECONOMIC STABILITY: FOOD INSECURITY: WITHIN THE PAST 12 MONTHS, YOU WORRIED THAT YOUR FOOD WOULD RUN OUT BEFORE YOU GOT MONEY TO BUY MORE.: SOMETIMES TRUE

## 2023-12-27 SDOH — ECONOMIC STABILITY: INCOME INSECURITY: IN THE PAST 12 MONTHS, HAS THE ELECTRIC, GAS, OIL, OR WATER COMPANY THREATENED TO SHUT OFF SERVICE IN YOUR HOME?: NO

## 2023-12-27 ASSESSMENT — COGNITIVE AND FUNCTIONAL STATUS - GENERAL
TURNING FROM BACK TO SIDE WHILE IN FLAT BAD: A LITTLE
DRESSING REGULAR UPPER BODY CLOTHING: A LITTLE
MOVING FROM LYING ON BACK TO SITTING ON SIDE OF FLAT BED WITH BEDRAILS: A LITTLE
TOILETING: A LITTLE
DRESSING REGULAR LOWER BODY CLOTHING: A LITTLE
CLIMB 3 TO 5 STEPS WITH RAILING: A LOT
PERSONAL GROOMING: A LITTLE
DRESSING REGULAR LOWER BODY CLOTHING: A LITTLE
MOBILITY SCORE: 16
MOVING TO AND FROM BED TO CHAIR: A LITTLE
HELP NEEDED FOR BATHING: A LITTLE
PERSONAL GROOMING: A LITTLE
STANDING UP FROM CHAIR USING ARMS: A LITTLE
DRESSING REGULAR UPPER BODY CLOTHING: A LITTLE
WALKING IN HOSPITAL ROOM: A LOT
DAILY ACTIVITIY SCORE: 19
HELP NEEDED FOR BATHING: A LITTLE
TOILETING: A LITTLE
DAILY ACTIVITIY SCORE: 19

## 2023-12-27 ASSESSMENT — PAIN SCALES - GENERAL
PAINLEVEL_OUTOF10: 7
PAINLEVEL_OUTOF10: 0 - NO PAIN
PAINLEVEL_OUTOF10: 0 - NO PAIN

## 2023-12-27 ASSESSMENT — PAIN - FUNCTIONAL ASSESSMENT
PAIN_FUNCTIONAL_ASSESSMENT: 0-10

## 2023-12-27 ASSESSMENT — ACTIVITIES OF DAILY LIVING (ADL): ADL_ASSISTANCE: INDEPENDENT

## 2023-12-27 ASSESSMENT — PAIN DESCRIPTION - LOCATION: LOCATION: HEAD

## 2023-12-27 NOTE — PROGRESS NOTES
Occupational Therapy    Occupational Therapy    Evaluation    Patient Name: Estephania Hernandez  MRN: [id number]  Today's Date: 12/27/2023  Time Calculation  Start Time: 1027  Stop Time: 1046  Time Calculation (min): 19 min    Assessment  IP OT Assessment  OT Assessment:  (Pt. presents with decreased balance impacting pt. ability to safely complete ADLs and mobilty independently.)  Prognosis: Good  Evaluation/Treatment Tolerance: Patient tolerated treatment well  End of Session Patient Position: Bed, 3 rail up, Alarm on    Plan:  Treatment Interventions: ADL retraining, Functional transfer training  OT Frequency: 3 times per week  OT Discharge Recommendations: Low intensity level of continued care  OT Recommended Transfer Status: Stand by assist  OT - OK to Discharge: Yes (Next level of care when cleared by medical team)    Subjective   Per EMR:   Estephania Hernandez is a 63 y.o. 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  The culture grew Pseudomonas and MRSA and Corynebacterium striatum.  She was on daptomycin and ceftazidime for about 12 weeks then transition to doxycycline for suppression.  She was admitted in October with right lower extremity cellulitis and was discharged on Augmentin with complete resolution of the cellulitis.  She still has wound VAC in place.  She has had around 2 years of intermittent infections in her leg requiring hyperbaric oxygen therapy and wound vacs and recurrent antibiotic treatments and follows with infectious disease Dr. Busby.     Her symptoms started the night prior to admission, she had lower extremity cellulitis and pain and subsequently came to the emergency room in the evening.  She was found to have an elevated white count, sinus tachycardia, lactic acidosis but was normotensive to hypertensive.  She received vancomycin and Zosyn and conservative fluid resuscitation with 1 L of LR due to prior history  of congestive heart failure, blood cultures were collected and she was admitted for management of lower extremity cellulitis requiring IV antibiotics considering prior history of MDR pathogens.     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 Cellulitis of right leg            General:  General  Reason for Referral: ADLs, discharge planning  Referred By: Dr. Ziegler  Past Medical History Relevant to Rehab: Chronic wound right LE with wound vac  Family/Caregiver Present: No  Co-Treatment: PT  Prior to Session Communication: Bedside nurse  Patient Position Received: Bed, 3 rail up, Alarm on    Precautions:  Medical Precautions: Fall precautions  Precautions Comment: Wound vac right LE      Pain:  Pain Assessment  Pain Assessment: 0-10  Pain Score: 0 - No pain    Objective     Cognition:  Overall Cognitive Status: Within Functional Limits  Orientation Level: Oriented X4          Home Living:  Home Living Comments:  (Pt. lives with spouse in home with 3 entry steps with bedroom and bathroom on first floor. PLOF sponge bathes, uses wc mostly, ww for short distances.)     Prior Function:  Level of Stevens: Independent with ADLs and functional transfers  ADL Assistance: Independent  Homemaking Assistance:  Independent  Ambulatory Assistance: Independent      ADL:  Grooming Assistance: Stand by  LE Dressing Assistance: Modified independent (Device)  LE Dressing Deficit: Don/doff R sock, Don/doff L sock (while in long sit)    Activity Tolerance:  Endurance: Endurance does not limit participation in activity    Bed Mobility/Transfers:   Bed Mobility  Bed Mobility:  (supine <> sit SUP)  Transfers  Transfer:  (sit<>stand SBA)    Ambulation/Gait Training:  Ambulation/Gait Training  Ambulation/Gait Training Performed:  (Pt. completes functional mobility with ww SBA)    Sitting Balance:  Static Sitting Balance  Static Sitting-Balance Support: No upper extremity supported  Static Sitting-Level of Assistance: Independent    Standing Balance:  Static Standing Balance  Static Standing-Balance Support: Bilateral upper extremity supported  Static Standing-Level of Assistance:  (SBA)  d      Sensation:  Sensation Comment:  (numbness to right LE)        Hand Function:  Hand Function  Gross Grasp: Functional  Coordination: Functional    Extremities: RUE   RUE : Within Functional Limits and LUE   LUE: Within Functional Limits    Outcome Measures: [facility] Daily Activity  Putting on and taking off regular lower body clothing: A little  Bathing (including washing, rinsing, drying): A little  Putting on and taking off regular upper body clothing: A little  Toileting, which includes using toilet, bedpan or urinal: A little  Taking care of personal grooming such as brushing teeth: A little  Eating Meals: None  Daily Activity - Total Score: 19          EDUCATION:  Education  Individual(s) Educated: Patient  Education Provided: Fall precautons, Risk and benefits of OT discussed with patient or other, POC discussed and agreed upon  Patient Response to Education: Patient/Caregiver Verbalized Understanding of Information      Goals:   Encounter Problems       Encounter Problems (Active)       Dressings Lower Extremities       STG - Patient to  complete lower body dressing       Start:  12/27/23               Grooming       STG - Patient completes grooming       Start:  12/27/23            STG - Patient will tolerate standing       Start:  12/27/23               Transfers       STG - Patient will perform toilet transfer       Start:  12/27/23

## 2023-12-27 NOTE — PROGRESS NOTES
12/27/23 0933   Discharge Planning   Living Arrangements Spouse/significant other   Support Systems Spouse/significant other   Assistance Needed [facility] for wound care   Type of Residence Private residence   Home or Post Acute Services In home services   Type of Home Care Services Home nursing visits;Home OT;Home PT   Patient expects to be discharged to: Home with [facility] home infusion and wound care   Patient Choice   Patient / Family choosing to utilize agency / facility established prior to hospitalization Yes     Met with patient at bedside. Patient lives at home with her spouse and is currently active with [facility] for wound care/has wound vac. Patient is independent with ADLs and uses a wheelchair, walker and cane. Patient states she has been walking more lately, prior to the pain leading up to this admission. Patient states her spouse recently lost his job and she was under his insurance. Patient was able to get emergency Medicaid and an [place] [employer] Card. Patient declined  consult as she states she already has assistance in place. Per MD note, patient will require long term IV antibiotics at d/c. Patient states she has had home infusion with [facility] in the past and would prefer the same agency. MD notified that once antibiotic recommendations are confirmed referral will need to be sent to [facility] home infusion. Patient will need a confirmed SOC prior to d/c.     1520- Per MD, patient will not need IV antibiotics at d/c. Patient to resume [facility]. Notified PCN and [facility] intake nurse that patient may d/c home today vs tomorrow. MD to send referral.

## 2023-12-27 NOTE — CARE PLAN
The patient's goals for the shift include      The clinical goals for the shift include See POC      Problem: Fall/Injury  Goal: Verbalize understanding of personal risk factors for fall in the hospital  Outcome: Progressing  Goal: Verbalize understanding of risk factor reduction measures to prevent injury from fall in the home  Outcome: Progressing     Problem: Skin  Goal: Decreased wound size/increased tissue granulation at next dressing change  Outcome: Progressing  Goal: Participates in plan/prevention/treatment measures  Outcome: Progressing  Goal: Prevent/manage excess moisture  Outcome: Progressing  Goal: Prevent/minimize sheer/friction injuries  Outcome: Progressing  Goal: Promote/optimize nutrition  Outcome: Progressing  Goal: Promote skin healing  Outcome: Progressing

## 2023-12-27 NOTE — DISCHARGE SUMMARY
Discharge Diagnosis  Cellulitis of right leg    Issues Requiring Follow-Up  Wound care / ID    Discharge Meds     Your medication list        START taking these medications        Instructions Last Dose Given Next Dose Due   ciprofloxacin 500 mg tablet  Commonly known as: Cipro  Start taking on: December 28, 2023      Take 1 tablet (500 mg) by mouth once every 24 hours for 13 days. Do not start before December 28, 2023.       linezolid 600 mg tablet  Commonly known as: Zyvox      Take 1 tablet (600 mg) by mouth every 12 hours for 25 doses.              CONTINUE taking these medications        Instructions Last Dose Given Next Dose Due   acetaminophen 325 mg tablet  Commonly known as: Tylenol           amitriptyline 25 mg tablet  Commonly known as: Elavil      Take 1 tablet (25 mg) by mouth once daily at bedtime.       apixaban 2.5 mg tablet  Commonly known as: Eliquis      Take 1 tablet (2.5 mg) by mouth 2 times a day.       aspirin 81 mg EC tablet           fexofenadine 180 mg tablet  Commonly known as: Allegra           metoprolol tartrate 50 mg tablet  Commonly known as: Lopressor           NIFEdipine ER 90 mg 24 hr tablet  Commonly known as: Adalat CC      Take 1 tablet (90 mg) by mouth in the morning and 1 tablet (90 mg) before bedtime.       omeprazole 20 mg DR capsule  Commonly known as: PriLOSEC           predniSONE 20 mg tablet  Commonly known as: Deltasone           predniSONE 1 mg tablet  Commonly known as: Deltasone                     Where to Get Your Medications        These medications were sent to [facility]  [address] [place] [address], [address], [facility] [address]      Hours: 8:30 AM to 5 PM Mon-Fri, 9 AM to 1 PM Sat Phone: [phone]   ciprofloxacin 500 mg tablet  linezolid 600 mg tablet         Test Results Pending At Discharge  Pending Labs       Order Current Status    Blood Culture Preliminary result    Blood Culture Preliminary result    Blood Culture Preliminary result    Blood  Culture Preliminary result    Tissue/Wound Culture/Smear Preliminary result            Hospital Course     Estephania Hernandez is a 63 y.o. 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  The culture grew Pseudomonas and MRSA and Corynebacterium striatum.  She was on daptomycin and ceftazidime for about 12 weeks then transition to doxycycline for suppression.  She was admitted in October with right lower extremity cellulitis and was discharged on Augmentin with complete resolution of the cellulitis.  She still has wound VAC in place.  She has had around 2 years of intermittent infections in her leg requiring hyperbaric oxygen therapy and wound vacs and recurrent antibiotic treatments and follows with infectious disease Dr. Busby.     Her symptoms started the night prior to admission, she had lower extremity cellulitis and pain and subsequently came to the emergency room in the evening.  She was found to have an elevated white count, sinus tachycardia, lactic acidosis but was normotensive to hypertensive.  She received vancomycin and Zosyn and conservative fluid resuscitation with 1 L of LR due to prior history of congestive heart failure, blood cultures were collected and she was admitted for management of lower extremity cellulitis requiring IV antibiotics considering prior history of MDR pathogens.     Ros: Denies chest pain, palpitations, nausea, vomiting, unusual joint pain, changes in vision, shortness of breath, focal weakness    Hospital course:   Patient was continued on vanc and zosyn initially after admission. Blood culture grew Psuedomona aeroginosa, sensitive to cipro / levaquin. Abx switched to Linezolid + Cipro with plan for 14 day treatment. There was concern for serotonin syndrome with Linezolid/amitriptyline, however patient request to continue her home dose amitriptyline, fully aware of the risk of the interaction explained  by ID pharmacy. Patient was seen by wound care, wound vac was changed. Patient discharged home with [facility] and instruction to follow up with ID    I have spent > 30min discharging the pt, care time spent include discharge planning, medication reconciliation and discussion of discharge instruction/follow up with the patient.        Pertinent Physical Exam At Time of Discharge    Constitutional: Well developed, awake/alert/oriented x3, no distress, alert and cooperative  Eyes: PERRL, EOMI, clear sclera  ENMT: mucous membranes moist  Head/Neck: Neck supple  Respiratory/Thorax: CTA b/l.   Cardiovascular: Regular, rate and rhythm, no murmurs  Gastrointestinal: Nondistended, soft, non-tender  Musculoskeletal: ROM intact  Extremities: swelling / erythema / warmth in the LLE below the knee down to foot improving, minimal tenderness on exam, wound vac in place.        Outpatient Follow-Up  Future Appointments   Date Time Provider Department [place]   12/28/2023  8:00 AM Woundcare Provider [facility]   12/28/2023  8:30 AM [facility] WOUND [facility]   12/29/2023 To Be Determined Delores Mcguire RN [facility]   12/29/2023  8:00 AM [facility] OP[facility]R WOUND [facility]   2/14/2024  9:00 AM Jeremiah Ceron MD [id number]         Syed Ziegler MD

## 2023-12-27 NOTE — PROGRESS NOTES
Advised [facility] of referral for resumption of services and d/c home today.       Moshe Alonzo

## 2023-12-27 NOTE — PROGRESS NOTES
INPATIENT PROGRESS NOTES    PATIENT NAME: Estephania Hernandez    MRN: [id number]  SERVICE DATE:  12/27/2023   SERVICE TIME:  3:48 PM    SIGNATURE: Cyndie Busby MD      SUBJECTIVE  Afebrile  No events overnight       OBJECTIVE  PHYSICAL EXAM:   Patient Vitals for the past 24 hrs:   BP Temp Temp src Pulse Resp SpO2   12/27/23 0800 (!) 143/91 36.3 °C (97.3 °F) Temporal 82 18 94 %   12/26/23 2000 131/86 36 °C (96.8 °F) Temporal 92 16 96 %   12/26/23 1600 121/70 36.9 °C (98.4 °F) Temporal 95 16 93 %         Gen: NAD  Neck: symmetric, no mass  Cardiovascular: RRR  Respiratory: No distress   GI: Abd soft, nontender, non-distended  Extremities: Right lower extremity wound VAC in place, erythema and swelling both are improving    Labs:  Lab Results   Component Value Date    WBC 11.9 (H) 12/27/2023    HGB 10.0 (L) 12/27/2023    HCT 32.5 (L) 12/27/2023     (H) 12/27/2023     12/27/2023     Lab Results   Component Value Date    GLUCOSE 84 12/27/2023    CALCIUM 9.3 12/27/2023     12/27/2023    K 3.5 12/27/2023    CO2 24 12/27/2023     12/27/2023    BUN 28 (H) 12/27/2023    CREATININE 2.29 (H) 12/27/2023   ESR: --  Lab Results   Component Value Date    SEDRATE 15 11/01/2023     Lab Results   Component Value Date    CRP 2.32 (H) 11/01/2023     Lab Results   Component Value Date    ALT 16 12/24/2023    AST 15 12/24/2023    ALKPHOS 61 12/24/2023    BILITOT 0.4 12/24/2023         DATA:   Diagnostic tests reviewed for today's visit:    Labs this admission reviewed  Imagings this admission reviewed  Cultures: Reviewed    ASSESSMENT :   -Low-grade Pseudomonas aeruginosa bacteremia.  Susceptibility reviewed  -Right lower extremity cellulitis.  Clinically improving  -Right lower extremity nonhealing wound with wound VAC in place  -Complex history of tibia/fibula fracture with hardware in place  -Chronic kidney disease  -Leukocytosis secondary to infection and steroid use.  Resolving  -History of hypertension, CHF, GCA  on chronic prednisone, PE     PLAN:  -The patient can be discharged on ciprofloxacin and linezolid to complete 14 days of treatment  -Stop date 1/8/24  -Counseled regarding antibiotics side effects  -The patient is taking amitriptyline and wanted to continue while on linezolid, counseled regarding uncommon but possible drug-drug interaction causing serotonin syndrome.  -The patient can follow-up with me in 2 weeks    Cyndie Busby MD.   Infectious Diseases Attending

## 2023-12-27 NOTE — NURSING NOTE
Shift summary  Pt got up to for the first time to commode with 2 assist to prevent putting weight on R foot. 1 assist should be ok today. Dressing assessed and is intact around wound vac. Minimal drainage. Vitals stable. No pain while at rest. R foot is red and swollen. Pt's questions answered.

## 2023-12-27 NOTE — PROGRESS NOTES
Medication Education     Medication education for Estephania Hernandez was provided to the patient  for the following medication(s):  Linezolid 600mg PO BID   -additionally counseled on DDI with amitriptyline-pt educated on small risk for serotonin syndrome and notified to contact Dr. Busby and hold meds if she experiences any concerning symptoms: fever, excessive sweating, involuntary muscle jerking/movements, racing heart, jitters. Pt voiced understanding    Ciprofloxacin 500mg QD      Medication education provided by a Pharmacist:  ADR Counseling Dose, frequency, storage How to take and what to do if a dose is missed Proper dose, indication, possible ADRs Any drug interactions (including OTCs and herbvals) and importance of notifying a healthcare provider of any medication changes Potential duration of therapy    Identified potential barriers to education:  None    Method(s) of Education:  Verbal Other teach back on s/sx of serotonin syndromew    An opportunity to ask questions and receive answers was provided.     Assessment of understanding the patient :  2= meets goals/outcomes and Teach back    Additional Notes (if applicable):     Olga Culp, PharmD

## 2023-12-27 NOTE — NURSING NOTE
0820 assisted pt back to bed with 1 assist from bedside commode. Pt denies pain at this time. Bed alarm active. Call light w/I reach.  1040 Dr Ziegler at bedside educating pt on medications.   1630 IV removed, discharge instructions reviewed, pt states understanding. Wound vac switched to home vac. Meds to beds delivered.

## 2023-12-28 ENCOUNTER — OFFICE VISIT (OUTPATIENT)
Dept: WOUND CARE | Facility: CLINIC | Age: 63
End: 2023-12-28
Payer: MEDICAID

## 2023-12-28 ENCOUNTER — TELEPHONE (OUTPATIENT)
Dept: PRIMARY CARE | Facility: CLINIC | Age: 63
End: 2023-12-28

## 2023-12-28 ENCOUNTER — APPOINTMENT (OUTPATIENT)
Dept: WOUND CARE | Facility: CLINIC | Age: 63
End: 2023-12-28
Payer: MEDICAID

## 2023-12-28 ENCOUNTER — DOCUMENTATION (OUTPATIENT)
Dept: HOME HEALTH SERVICES | Facility: HOME HEALTH | Age: 63
End: 2023-12-28
Payer: MEDICAID

## 2023-12-28 ENCOUNTER — PATIENT OUTREACH (OUTPATIENT)
Dept: PRIMARY CARE | Facility: CLINIC | Age: 63
End: 2023-12-28
Payer: MEDICAID

## 2023-12-28 LAB
B-LACTAMASE ORGANISM ISLT: POSITIVE
BACTERIA SPEC CULT: ABNORMAL
GRAM STN SPEC: ABNORMAL
GRAM STN SPEC: ABNORMAL

## 2023-12-28 PROCEDURE — 11044 DBRDMT BONE 1ST 20 SQ CM/<: CPT

## 2023-12-28 PROCEDURE — 99183 HYPERBARIC OXYGEN THERAPY: CPT | Performed by: SURGERY

## 2023-12-28 PROCEDURE — G0277 HBOT, FULL BODY CHAMBER, 30M: HCPCS

## 2023-12-28 NOTE — TELEPHONE ENCOUNTER
----- Message from Juhi Foote CMA sent at 12/28/2023 12:45 PM EST -----  Regarding: tcm    Can you please contact pt to schedule hosp  follow up within 14 days of discharge.  This patient was discharged from:  [facility]  Discharge diagnosis:   cellulitis  Date of discharge:     12/27/2023     Thank you      Not sure patient will follow up with PCP. She has many appts with wound clinic.

## 2023-12-28 NOTE — PROGRESS NOTES
Discharge Facility: [facility]  Discharge Diagnosis: Bacterial skin infection of leg   Admission Date: 12/25/2023  Discharge Date:  12/27/2023    PCP Appointment Date: none  Specialist Appointment Date: wound clinic 12/29/2023  Hospital Encounter and Summary: Linked   See discharge assessment below for further details    New meds:   ciprofloxacin 500 mg tablet  linezolid 600 mg tablet    Engagement  Call Start Time: 1232 (12/28/2023 12:32 PM)    Medications  Medications reviewed with patient/caregiver?: Yes (12/28/2023 12:32 PM)  Is the patient having any side effects they believe may be caused by any medication additions or changes?: No (12/28/2023 12:32 PM)  Does the patient have all medications ordered at discharge?: Yes (12/28/2023 12:32 PM)  Care Management Interventions: No intervention needed (12/28/2023 12:32 PM)  Prescription Comments: new meds: cipro, zyvox (12/28/2023 12:32 PM)  Is the patient taking all medications as directed (includes completed medication regime)?: Yes (12/28/2023 12:32 PM)  Medication Comments: see med list (12/28/2023 12:32 PM)    Appointments  Does the patient have a primary care provider?: Yes (12/28/2023 12:32 PM)  Care Management Interventions: Advised patient to make appointment (12/28/2023 12:32 PM)  Has the patient kept scheduled appointments due by today?: Yes (12/28/2023 12:32 PM)  Care Management Interventions: Advised patient to keep appointment (12/28/2023 12:32 PM)    Self Management  What is the home health agency?:  home care- start of care- friday 12/29/2023 (12/28/2023 12:32 PM)  Has home health visited the patient within 72 hours of discharge?: Call prior to 72 hours (12/28/2023 12:32 PM)    Patient Teaching  Does the patient have access to their discharge instructions?: Yes (12/28/2023 12:32 PM)  Care Management Interventions: Reviewed instructions with patient (12/28/2023 12:32 PM)  What is the patient's perception of their health status since discharge?: Same  (12/28/2023 12:32 PM)  Is the patient/caregiver able to teach back the hierarchy of who to call/visit for symptoms/problems? PCP, Specialist, Home Health nurse, Urgent Care, ED, 911: Yes (12/28/2023 12:32 PM)    Wrap Up  Wrap Up Additional Comments: patient admitted to [facility] 12/25/2023 with a bacterial skin infection of leg. discharged on 12/27/2023 with new meds: cipro and zyvox. CTS spoke with patient. she stated she is doing as good as to be expected. Patient is very frustrated regarding the care she has recieved. She has seen multiple doctors and has gotten multiple answers and treatment plans. She is very overwhelmed and confused on who she should listen to. She stated that she is about the same. Wound vac is off and  Home care is supposed to start care Friday 12/29/2023 to put wound vac back on. Wound clinic appt today 12/28/2023. Has all meds prescribed and no questions as of this time. (12/28/2023 12:32 PM)  Call End Time: 1238 (12/28/2023 12:32 PM)

## 2023-12-28 NOTE — TELEPHONE ENCOUNTER
Pt stated she can not get here before 11:30 due to appointment with hyperbaric chamber every morning.  She said she also has other appointments to manage her skin cancer.

## 2023-12-28 NOTE — HH CARE COORDINATION
Home Care received a Referral for Nursing. PT OTWe have processed the referral for a Start of Care on 12.30. 23    If you have any questions or concerns, please feel free to contact us at [phone]. Follow the prompts, enter your five digit zip code, and you will be directed to your care team on WE[facility] 2.

## 2023-12-29 ENCOUNTER — HOME CARE VISIT (OUTPATIENT)
Dept: HOME HEALTH SERVICES | Facility: HOME HEALTH | Age: 63
End: 2023-12-29
Payer: MEDICAID

## 2023-12-29 ENCOUNTER — OFFICE VISIT (OUTPATIENT)
Dept: WOUND CARE | Facility: CLINIC | Age: 63
End: 2023-12-29
Payer: MEDICAID

## 2023-12-29 VITALS
RESPIRATION RATE: 18 BRPM | TEMPERATURE: 97.5 F | HEART RATE: 100 BPM | DIASTOLIC BLOOD PRESSURE: 88 MMHG | SYSTOLIC BLOOD PRESSURE: 158 MMHG

## 2023-12-29 LAB
BACTERIA BLD AEROBE CULT: ABNORMAL
BACTERIA BLD CULT: ABNORMAL
BACTERIA BLD CULT: NORMAL
GRAM STN SPEC: ABNORMAL

## 2023-12-29 PROCEDURE — 99183 HYPERBARIC OXYGEN THERAPY: CPT | Performed by: SURGERY

## 2023-12-29 PROCEDURE — G0277 HBOT, FULL BODY CHAMBER, 30M: HCPCS

## 2023-12-29 PROCEDURE — G0299 HHS/HOSPICE OF RN EA 15 MIN: HCPCS

## 2023-12-29 ASSESSMENT — ENCOUNTER SYMPTOMS
PERSON REPORTING PAIN: PATIENT
DENIES PAIN: 1
APPETITE LEVEL: GOOD
LOWER EXTREMITY EDEMA: 1

## 2023-12-29 ASSESSMENT — ACTIVITIES OF DAILY LIVING (ADL)
ENTERING_EXITING_HOME: MODERATE ASSIST
OASIS_M1830: 03

## 2023-12-31 LAB
BACTERIA BLD CULT: NORMAL
BACTERIA BLD CULT: NORMAL

## 2024-01-02 ENCOUNTER — OFFICE VISIT (OUTPATIENT)
Dept: WOUND CARE | Facility: CLINIC | Age: 64
End: 2024-01-02
Payer: MEDICAID

## 2024-01-02 ENCOUNTER — HOME CARE VISIT (OUTPATIENT)
Dept: HOME HEALTH SERVICES | Facility: HOME HEALTH | Age: 64
End: 2024-01-02
Payer: MEDICAID

## 2024-01-02 VITALS
RESPIRATION RATE: 18 BRPM | HEART RATE: 70 BPM | DIASTOLIC BLOOD PRESSURE: 96 MMHG | SYSTOLIC BLOOD PRESSURE: 138 MMHG | OXYGEN SATURATION: 97 %

## 2024-01-02 PROBLEM — I50.22 CHRONIC SYSTOLIC (CONGESTIVE) HEART FAILURE (MULTI): Status: ACTIVE | Noted: 2024-01-02

## 2024-01-02 PROCEDURE — G0277 HBOT, FULL BODY CHAMBER, 30M: HCPCS

## 2024-01-02 PROCEDURE — 0023 HH ROC

## 2024-01-02 PROCEDURE — G0300 HHS/HOSPICE OF LPN EA 15 MIN: HCPCS

## 2024-01-02 SDOH — ECONOMIC STABILITY: GENERAL

## 2024-01-02 ASSESSMENT — ACTIVITIES OF DAILY LIVING (ADL)
BATHING ASSESSED: 1
DRESSING_UB_CURRENT_FUNCTION: INDEPENDENT
BATHING_CURRENT_FUNCTION: INDEPENDENT
TOILETING: INDEPENDENT
FEEDING: INDEPENDENT
AMBULATION ASSISTANCE: 1
FEEDING ASSESSED: 1
DRESSING_LB_CURRENT_FUNCTION: INDEPENDENT
TOILETING: 1
MONEY MANAGEMENT (EXPENSES/BILLS): INDEPENDENT
AMBULATION ASSISTANCE: STAND BY ASSIST

## 2024-01-02 ASSESSMENT — ENCOUNTER SYMPTOMS
SUBJECTIVE PAIN PROGRESSION: RESOLVED
OCCASIONAL FEELINGS OF UNSTEADINESS: 0
DENIES PAIN: 1
PERSON REPORTING PAIN: PATIENT
APPETITE LEVEL: GOOD
DEPRESSION: 0
LOWER EXTREMITY EDEMA: 1
CHANGE IN APPETITE: UNCHANGED
LOSS OF SENSATION IN FEET: 0
MUSCLE WEAKNESS: 1
LIMITED RANGE OF MOTION: 1

## 2024-01-03 ENCOUNTER — OFFICE VISIT (OUTPATIENT)
Dept: WOUND CARE | Facility: CLINIC | Age: 64
End: 2024-01-03
Payer: MEDICAID

## 2024-01-03 PROCEDURE — G0277 HBOT, FULL BODY CHAMBER, 30M: HCPCS

## 2024-01-04 ENCOUNTER — OFFICE VISIT (OUTPATIENT)
Dept: WOUND CARE | Facility: CLINIC | Age: 64
End: 2024-01-04
Payer: MEDICAID

## 2024-01-04 DIAGNOSIS — I50.20 HFREF (HEART FAILURE WITH REDUCED EJECTION FRACTION) (MULTI): Primary | ICD-10-CM

## 2024-01-04 DIAGNOSIS — Z79.4 TYPE 2 DIABETES MELLITUS WITH DIABETIC DERMATITIS, WITH LONG-TERM CURRENT USE OF INSULIN (MULTI): ICD-10-CM

## 2024-01-04 DIAGNOSIS — I63.9 CEREBROVASCULAR ACCIDENT (CVA), UNSPECIFIED MECHANISM (MULTI): ICD-10-CM

## 2024-01-04 DIAGNOSIS — E11.620 TYPE 2 DIABETES MELLITUS WITH DIABETIC DERMATITIS, WITH LONG-TERM CURRENT USE OF INSULIN (MULTI): ICD-10-CM

## 2024-01-04 PROCEDURE — G0277 HBOT, FULL BODY CHAMBER, 30M: HCPCS

## 2024-01-04 PROCEDURE — 97605 NEG PRS WND THER DME<=50SQCM: CPT | Performed by: PLASTIC SURGERY

## 2024-01-04 PROCEDURE — 11043 DBRDMT MUSC&/FSCA 1ST 20/<: CPT | Performed by: PLASTIC SURGERY

## 2024-01-04 PROCEDURE — 11043 DBRDMT MUSC&/FSCA 1ST 20/<: CPT

## 2024-01-04 PROCEDURE — 99183 HYPERBARIC OXYGEN THERAPY: CPT | Performed by: PLASTIC SURGERY

## 2024-01-04 PROCEDURE — 1036F TOBACCO NON-USER: CPT | Performed by: PLASTIC SURGERY

## 2024-01-04 PROCEDURE — 97605 NEG PRS WND THER DME<=50SQCM: CPT

## 2024-01-05 ENCOUNTER — OFFICE VISIT (OUTPATIENT)
Dept: WOUND CARE | Facility: CLINIC | Age: 64
End: 2024-01-05
Payer: MEDICAID

## 2024-01-05 PROCEDURE — G0277 HBOT, FULL BODY CHAMBER, 30M: HCPCS

## 2024-01-05 PROCEDURE — 99183 HYPERBARIC OXYGEN THERAPY: CPT | Performed by: SURGERY

## 2024-01-06 ENCOUNTER — HOME CARE VISIT (OUTPATIENT)
Dept: HOME HEALTH SERVICES | Facility: HOME HEALTH | Age: 64
End: 2024-01-06
Payer: MEDICAID

## 2024-01-06 VITALS
OXYGEN SATURATION: 98 % | SYSTOLIC BLOOD PRESSURE: 120 MMHG | DIASTOLIC BLOOD PRESSURE: 72 MMHG | RESPIRATION RATE: 18 BRPM | HEART RATE: 78 BPM

## 2024-01-06 PROCEDURE — G0300 HHS/HOSPICE OF LPN EA 15 MIN: HCPCS

## 2024-01-06 SDOH — ECONOMIC STABILITY: GENERAL

## 2024-01-06 ASSESSMENT — ENCOUNTER SYMPTOMS
MUSCLE WEAKNESS: 1
DENIES PAIN: 1
DEPRESSION: 0
OCCASIONAL FEELINGS OF UNSTEADINESS: 0
CHANGE IN APPETITE: UNCHANGED
APPETITE LEVEL: GOOD
LIMITED RANGE OF MOTION: 1
LOSS OF SENSATION IN FEET: 0
PERSON REPORTING PAIN: PATIENT

## 2024-01-06 ASSESSMENT — ACTIVITIES OF DAILY LIVING (ADL)
DRESSING_UB_CURRENT_FUNCTION: INDEPENDENT
FEEDING ASSESSED: 1
AMBULATION ASSISTANCE: 1
BATHING ASSESSED: 1
MONEY MANAGEMENT (EXPENSES/BILLS): INDEPENDENT
TOILETING: INDEPENDENT
TOILETING: 1
AMBULATION ASSISTANCE: STAND BY ASSIST
FEEDING: INDEPENDENT
DRESSING_LB_CURRENT_FUNCTION: INDEPENDENT
BATHING_CURRENT_FUNCTION: INDEPENDENT

## 2024-01-08 ENCOUNTER — LAB REQUISITION (OUTPATIENT)
Dept: LAB | Facility: HOSPITAL | Age: 64
End: 2024-01-08
Payer: MEDICAID

## 2024-01-08 ENCOUNTER — OFFICE VISIT (OUTPATIENT)
Dept: WOUND CARE | Facility: CLINIC | Age: 64
End: 2024-01-08
Payer: MEDICAID

## 2024-01-08 DIAGNOSIS — L97.316: ICD-10-CM

## 2024-01-08 DIAGNOSIS — M86.661: ICD-10-CM

## 2024-01-08 DIAGNOSIS — T81.31XD DISRUPTION OF EXTERNAL OPERATION (SURGICAL) WOUND, NOT ELSEWHERE CLASSIFIED, SUBSEQUENT ENCOUNTER: ICD-10-CM

## 2024-01-08 DIAGNOSIS — L97.811 NON-PRESSURE CHRONIC ULCER OF OTHER PART OF RIGHT LOWER LEG LIMITED TO BREAKDOWN OF SKIN (MULTI): ICD-10-CM

## 2024-01-08 PROCEDURE — 99183 HYPERBARIC OXYGEN THERAPY: CPT | Performed by: PLASTIC SURGERY

## 2024-01-08 PROCEDURE — G0277 HBOT, FULL BODY CHAMBER, 30M: HCPCS

## 2024-01-08 PROCEDURE — 11043 DBRDMT MUSC&/FSCA 1ST 20/<: CPT | Performed by: PLASTIC SURGERY

## 2024-01-08 PROCEDURE — 11043 DBRDMT MUSC&/FSCA 1ST 20/<: CPT

## 2024-01-08 PROCEDURE — 87186 SC STD MICRODIL/AGAR DIL: CPT | Mod: 59,OUT,ELYLAB | Performed by: PLASTIC SURGERY

## 2024-01-08 PROCEDURE — 1036F TOBACCO NON-USER: CPT | Performed by: PLASTIC SURGERY

## 2024-01-09 ENCOUNTER — OFFICE VISIT (OUTPATIENT)
Dept: WOUND CARE | Facility: CLINIC | Age: 64
End: 2024-01-09
Payer: MEDICAID

## 2024-01-09 DIAGNOSIS — I82.431 ACUTE DEEP VEIN THROMBOSIS (DVT) OF POPLITEAL VEIN OF RIGHT LOWER EXTREMITY (MULTI): ICD-10-CM

## 2024-01-09 PROCEDURE — G0277 HBOT, FULL BODY CHAMBER, 30M: HCPCS

## 2024-01-09 PROCEDURE — 99183 HYPERBARIC OXYGEN THERAPY: CPT | Performed by: SURGERY

## 2024-01-10 ENCOUNTER — OFFICE VISIT (OUTPATIENT)
Dept: WOUND CARE | Facility: CLINIC | Age: 64
End: 2024-01-10
Payer: MEDICAID

## 2024-01-10 ENCOUNTER — PATIENT OUTREACH (OUTPATIENT)
Dept: PRIMARY CARE | Facility: CLINIC | Age: 64
End: 2024-01-10
Payer: MEDICAID

## 2024-01-10 PROCEDURE — G0277 HBOT, FULL BODY CHAMBER, 30M: HCPCS

## 2024-01-10 NOTE — PROGRESS NOTES
Unable to reach patient for call back 14 days after patient's hosptial discharge. No follow up appointment with PCP.   LVM with call back number for patient to call if needed

## 2024-01-11 ENCOUNTER — APPOINTMENT (OUTPATIENT)
Dept: WOUND CARE | Facility: CLINIC | Age: 64
End: 2024-01-11
Payer: MEDICAID

## 2024-01-11 ENCOUNTER — OFFICE VISIT (OUTPATIENT)
Dept: WOUND CARE | Facility: CLINIC | Age: 64
End: 2024-01-11
Payer: MEDICAID

## 2024-01-11 PROCEDURE — 99183 HYPERBARIC OXYGEN THERAPY: CPT | Performed by: PLASTIC SURGERY

## 2024-01-11 PROCEDURE — G0277 HBOT, FULL BODY CHAMBER, 30M: HCPCS

## 2024-01-12 ENCOUNTER — HOME CARE VISIT (OUTPATIENT)
Dept: HOME HEALTH SERVICES | Facility: HOME HEALTH | Age: 64
End: 2024-01-12
Payer: MEDICAID

## 2024-01-12 ENCOUNTER — OFFICE VISIT (OUTPATIENT)
Dept: WOUND CARE | Facility: CLINIC | Age: 64
End: 2024-01-12
Payer: MEDICAID

## 2024-01-12 VITALS
SYSTOLIC BLOOD PRESSURE: 118 MMHG | OXYGEN SATURATION: 98 % | HEART RATE: 73 BPM | RESPIRATION RATE: 18 BRPM | DIASTOLIC BLOOD PRESSURE: 76 MMHG

## 2024-01-12 LAB
BACTERIA SPEC CULT: ABNORMAL
BACTERIA SPEC CULT: ABNORMAL
GRAM STN SPEC: ABNORMAL
GRAM STN SPEC: ABNORMAL

## 2024-01-12 PROCEDURE — G0300 HHS/HOSPICE OF LPN EA 15 MIN: HCPCS

## 2024-01-12 PROCEDURE — G0277 HBOT, FULL BODY CHAMBER, 30M: HCPCS

## 2024-01-12 PROCEDURE — 99183 HYPERBARIC OXYGEN THERAPY: CPT | Performed by: SURGERY

## 2024-01-12 SDOH — ECONOMIC STABILITY: GENERAL

## 2024-01-12 ASSESSMENT — ACTIVITIES OF DAILY LIVING (ADL)
BATHING_CURRENT_FUNCTION: INDEPENDENT
FEEDING: INDEPENDENT
TOILETING: INDEPENDENT
AMBULATION ASSISTANCE: 1
AMBULATION ASSISTANCE: STAND BY ASSIST
FEEDING ASSESSED: 1
MONEY MANAGEMENT (EXPENSES/BILLS): INDEPENDENT
TOILETING: 1
DRESSING_LB_CURRENT_FUNCTION: INDEPENDENT
BATHING ASSESSED: 1
DRESSING_UB_CURRENT_FUNCTION: INDEPENDENT

## 2024-01-12 ASSESSMENT — ENCOUNTER SYMPTOMS
CHANGE IN APPETITE: UNCHANGED
LOSS OF SENSATION IN FEET: 0
DENIES PAIN: 1
OCCASIONAL FEELINGS OF UNSTEADINESS: 0
DEPRESSION: 0
APPETITE LEVEL: GOOD

## 2024-01-13 ENCOUNTER — HOME CARE VISIT (OUTPATIENT)
Dept: HOME HEALTH SERVICES | Facility: HOME HEALTH | Age: 64
End: 2024-01-13
Payer: MEDICAID

## 2024-01-15 ENCOUNTER — OFFICE VISIT (OUTPATIENT)
Dept: WOUND CARE | Facility: CLINIC | Age: 64
End: 2024-01-15
Payer: MEDICAID

## 2024-01-15 PROCEDURE — 97605 NEG PRS WND THER DME<=50SQCM: CPT | Performed by: PLASTIC SURGERY

## 2024-01-15 PROCEDURE — 97605 NEG PRS WND THER DME<=50SQCM: CPT

## 2024-01-15 PROCEDURE — 11043 DBRDMT MUSC&/FSCA 1ST 20/<: CPT | Performed by: PLASTIC SURGERY

## 2024-01-15 PROCEDURE — 1036F TOBACCO NON-USER: CPT | Performed by: PLASTIC SURGERY

## 2024-01-15 PROCEDURE — 11043 DBRDMT MUSC&/FSCA 1ST 20/<: CPT

## 2024-01-15 PROCEDURE — G0277 HBOT, FULL BODY CHAMBER, 30M: HCPCS

## 2024-01-15 PROCEDURE — 99183 HYPERBARIC OXYGEN THERAPY: CPT | Performed by: PLASTIC SURGERY

## 2024-01-16 ENCOUNTER — OFFICE VISIT (OUTPATIENT)
Dept: WOUND CARE | Facility: CLINIC | Age: 64
End: 2024-01-16
Payer: MEDICAID

## 2024-01-16 PROCEDURE — G0277 HBOT, FULL BODY CHAMBER, 30M: HCPCS

## 2024-01-16 PROCEDURE — 99183 HYPERBARIC OXYGEN THERAPY: CPT | Performed by: SURGERY

## 2024-01-17 ENCOUNTER — OFFICE VISIT (OUTPATIENT)
Dept: WOUND CARE | Facility: CLINIC | Age: 64
End: 2024-01-17
Payer: MEDICAID

## 2024-01-17 ENCOUNTER — CLINICAL SUPPORT (OUTPATIENT)
Dept: WOUND CARE | Facility: CLINIC | Age: 64
End: 2024-01-17
Payer: MEDICAID

## 2024-01-17 PROCEDURE — 97605 NEG PRS WND THER DME<=50SQCM: CPT

## 2024-01-17 PROCEDURE — G0277 HBOT, FULL BODY CHAMBER, 30M: HCPCS

## 2024-01-18 ENCOUNTER — HOME CARE VISIT (OUTPATIENT)
Dept: HOME HEALTH SERVICES | Facility: HOME HEALTH | Age: 64
End: 2024-01-18
Payer: MEDICAID

## 2024-01-18 ENCOUNTER — OFFICE VISIT (OUTPATIENT)
Dept: WOUND CARE | Facility: CLINIC | Age: 64
End: 2024-01-18
Payer: MEDICAID

## 2024-01-18 PROCEDURE — 99183 HYPERBARIC OXYGEN THERAPY: CPT | Performed by: PLASTIC SURGERY

## 2024-01-18 PROCEDURE — G0277 HBOT, FULL BODY CHAMBER, 30M: HCPCS

## 2024-01-19 ENCOUNTER — APPOINTMENT (OUTPATIENT)
Dept: WOUND CARE | Facility: CLINIC | Age: 64
End: 2024-01-19
Payer: MEDICAID

## 2024-01-20 ENCOUNTER — APPOINTMENT (OUTPATIENT)
Dept: HOME HEALTH SERVICES | Facility: HOME HEALTH | Age: 64
End: 2024-01-20
Payer: MEDICAID

## 2024-01-20 ENCOUNTER — HOME CARE VISIT (OUTPATIENT)
Dept: HOME HEALTH SERVICES | Facility: HOME HEALTH | Age: 64
End: 2024-01-20
Payer: MEDICAID

## 2024-01-22 ENCOUNTER — OFFICE VISIT (OUTPATIENT)
Dept: WOUND CARE | Facility: CLINIC | Age: 64
End: 2024-01-22
Payer: MEDICAID

## 2024-01-22 PROCEDURE — 11043 DBRDMT MUSC&/FSCA 1ST 20/<: CPT | Performed by: PLASTIC SURGERY

## 2024-01-22 PROCEDURE — G0277 HBOT, FULL BODY CHAMBER, 30M: HCPCS

## 2024-01-22 PROCEDURE — 1036F TOBACCO NON-USER: CPT | Performed by: PLASTIC SURGERY

## 2024-01-22 PROCEDURE — 11043 DBRDMT MUSC&/FSCA 1ST 20/<: CPT

## 2024-01-22 PROCEDURE — 99183 HYPERBARIC OXYGEN THERAPY: CPT | Performed by: PLASTIC SURGERY

## 2024-01-23 ENCOUNTER — OFFICE VISIT (OUTPATIENT)
Dept: WOUND CARE | Facility: CLINIC | Age: 64
End: 2024-01-23
Payer: MEDICAID

## 2024-01-23 PROCEDURE — G0277 HBOT, FULL BODY CHAMBER, 30M: HCPCS

## 2024-01-23 PROCEDURE — 99183 HYPERBARIC OXYGEN THERAPY: CPT | Performed by: SURGERY

## 2024-01-24 ENCOUNTER — CLINICAL SUPPORT (OUTPATIENT)
Dept: WOUND CARE | Facility: CLINIC | Age: 64
End: 2024-01-24
Payer: MEDICAID

## 2024-01-24 ENCOUNTER — OFFICE VISIT (OUTPATIENT)
Dept: WOUND CARE | Facility: CLINIC | Age: 64
End: 2024-01-24
Payer: MEDICAID

## 2024-01-24 PROCEDURE — 29581 APPL MULTLAYER CMPRN SYS LEG: CPT

## 2024-01-24 PROCEDURE — G0277 HBOT, FULL BODY CHAMBER, 30M: HCPCS

## 2024-01-25 ENCOUNTER — HOME CARE VISIT (OUTPATIENT)
Dept: HOME HEALTH SERVICES | Facility: HOME HEALTH | Age: 64
End: 2024-01-25
Payer: MEDICAID

## 2024-01-26 ENCOUNTER — HOME CARE VISIT (OUTPATIENT)
Dept: HOME HEALTH SERVICES | Facility: HOME HEALTH | Age: 64
End: 2024-01-26
Payer: MEDICAID

## 2024-01-29 ENCOUNTER — OFFICE VISIT (OUTPATIENT)
Dept: WOUND CARE | Facility: CLINIC | Age: 64
End: 2024-01-29
Payer: MEDICAID

## 2024-01-29 ENCOUNTER — HOME CARE VISIT (OUTPATIENT)
Dept: HOME HEALTH SERVICES | Facility: HOME HEALTH | Age: 64
End: 2024-01-29
Payer: MEDICAID

## 2024-01-29 PROCEDURE — 11043 DBRDMT MUSC&/FSCA 1ST 20/<: CPT | Performed by: PLASTIC SURGERY

## 2024-01-29 PROCEDURE — 11043 DBRDMT MUSC&/FSCA 1ST 20/<: CPT

## 2024-01-29 PROCEDURE — 1036F TOBACCO NON-USER: CPT | Performed by: PLASTIC SURGERY

## 2024-01-31 ENCOUNTER — HOME CARE VISIT (OUTPATIENT)
Dept: HOME HEALTH SERVICES | Facility: HOME HEALTH | Age: 64
End: 2024-01-31
Payer: MEDICAID

## 2024-01-31 ENCOUNTER — CLINICAL SUPPORT (OUTPATIENT)
Dept: WOUND CARE | Facility: CLINIC | Age: 64
End: 2024-01-31
Payer: MEDICAID

## 2024-01-31 PROCEDURE — 29581 APPL MULTLAYER CMPRN SYS LEG: CPT

## 2024-01-31 PROCEDURE — 97605 NEG PRS WND THER DME<=50SQCM: CPT

## 2024-02-01 ENCOUNTER — HOME CARE VISIT (OUTPATIENT)
Dept: HOME HEALTH SERVICES | Facility: HOME HEALTH | Age: 64
End: 2024-02-01
Payer: MEDICAID

## 2024-02-02 ENCOUNTER — CLINICAL SUPPORT (OUTPATIENT)
Dept: WOUND CARE | Facility: CLINIC | Age: 64
End: 2024-02-02
Payer: MEDICAID

## 2024-02-02 PROCEDURE — 97605 NEG PRS WND THER DME<=50SQCM: CPT

## 2024-02-02 PROCEDURE — 29581 APPL MULTLAYER CMPRN SYS LEG: CPT

## 2024-02-05 ENCOUNTER — LAB REQUISITION (OUTPATIENT)
Dept: LAB | Facility: HOSPITAL | Age: 64
End: 2024-02-05
Payer: MEDICAID

## 2024-02-05 ENCOUNTER — OFFICE VISIT (OUTPATIENT)
Dept: WOUND CARE | Facility: CLINIC | Age: 64
End: 2024-02-05
Payer: MEDICAID

## 2024-02-05 DIAGNOSIS — L97.811 NON-PRESSURE CHRONIC ULCER OF OTHER PART OF RIGHT LOWER LEG LIMITED TO BREAKDOWN OF SKIN: ICD-10-CM

## 2024-02-05 DIAGNOSIS — L97.316: ICD-10-CM

## 2024-02-05 DIAGNOSIS — M86.661 OTHER CHRONIC OSTEOMYELITIS, RIGHT TIBIA AND FIBULA: ICD-10-CM

## 2024-02-05 DIAGNOSIS — T81.31XD DISRUPTION OF EXTERNAL OPERATION (SURGICAL) WOUND, NOT ELSEWHERE CLASSIFIED, SUBSEQUENT ENCOUNTER: ICD-10-CM

## 2024-02-05 PROCEDURE — 11043 DBRDMT MUSC&/FSCA 1ST 20/<: CPT | Performed by: PLASTIC SURGERY

## 2024-02-05 PROCEDURE — 87186 SC STD MICRODIL/AGAR DIL: CPT | Mod: 59 | Performed by: PLASTIC SURGERY

## 2024-02-05 PROCEDURE — 1036F TOBACCO NON-USER: CPT | Performed by: PLASTIC SURGERY

## 2024-02-05 PROCEDURE — 11043 DBRDMT MUSC&/FSCA 1ST 20/<: CPT

## 2024-02-07 ENCOUNTER — HOME CARE VISIT (OUTPATIENT)
Dept: HOME HEALTH SERVICES | Facility: HOME HEALTH | Age: 64
End: 2024-02-07
Payer: MEDICAID

## 2024-02-08 ENCOUNTER — PATIENT OUTREACH (OUTPATIENT)
Dept: PRIMARY CARE | Facility: CLINIC | Age: 64
End: 2024-02-08
Payer: MEDICAID

## 2024-02-08 LAB
BACTERIA SPEC CULT: ABNORMAL
BACTERIA SPEC CULT: ABNORMAL
GRAM STN SPEC: ABNORMAL

## 2024-02-09 ENCOUNTER — HOME CARE VISIT (OUTPATIENT)
Dept: HOME HEALTH SERVICES | Facility: HOME HEALTH | Age: 64
End: 2024-02-09
Payer: MEDICAID

## 2024-02-12 ENCOUNTER — OFFICE VISIT (OUTPATIENT)
Dept: WOUND CARE | Facility: CLINIC | Age: 64
End: 2024-02-12
Payer: MEDICAID

## 2024-02-12 PROCEDURE — 1036F TOBACCO NON-USER: CPT | Performed by: PLASTIC SURGERY

## 2024-02-12 PROCEDURE — 11042 DBRDMT SUBQ TIS 1ST 20SQCM/<: CPT

## 2024-02-12 PROCEDURE — 11042 DBRDMT SUBQ TIS 1ST 20SQCM/<: CPT | Performed by: PLASTIC SURGERY

## 2024-02-14 ENCOUNTER — HOME CARE VISIT (OUTPATIENT)
Dept: HOME HEALTH SERVICES | Facility: HOME HEALTH | Age: 64
End: 2024-02-14
Payer: MEDICAID

## 2024-02-14 ENCOUNTER — OFFICE VISIT (OUTPATIENT)
Dept: OTOLARYNGOLOGY | Facility: CLINIC | Age: 64
End: 2024-02-14
Payer: MEDICAID

## 2024-02-14 DIAGNOSIS — H69.93 DYSFUNCTION OF BOTH EUSTACHIAN TUBES: Primary | ICD-10-CM

## 2024-02-14 PROCEDURE — 1036F TOBACCO NON-USER: CPT | Performed by: OTOLARYNGOLOGY

## 2024-02-14 PROCEDURE — 99212 OFFICE O/P EST SF 10 MIN: CPT | Performed by: OTOLARYNGOLOGY

## 2024-02-14 ASSESSMENT — ACTIVITIES OF DAILY LIVING (ADL)
HOME_HEALTH_OASIS: 00
OASIS_M1830: 01

## 2024-02-14 NOTE — PROGRESS NOTES
Estephania Hernandez is a 63 y.o. year old female patient with history of eustachian tube dysfunction secondary to hyperbaric treatment.  Patient has bilateral PE tubes and is here for 6-month follow-up.  Patient is without other ENT related concerns.  There have been no significant changes in past medical or past surgical histories except as mentioned.             Physical Exam:   General appearance: No acute distress. Normal facies. Symmetric facial movement. No gross lesions of the face are noted.  The external ear structures appear normal.  Patient with bilateral tubes intact and dry.  Anterior rhinoscopy notes essentially a midline nasal septum. Examination is noted for normal healthy mucosal membranes without any evidence of lesions, polyps, or exudate. The tongue is normally mobile. There are no lesions on the gingiva, buccal, or oral mucosa. There are no oral cavity masses.  The neck is negative for mass lymphadenopathy. The trachea and parotid are clear. The thyroid bed is grossly unremarkable. The salivary gland structures are grossly unremarkable.    Assessment/Plan   1.  Eustachian tube dysfunction    Patient with bilateral eustachian tube dysfunction with intact tubes.  Recommendation for the patient at this time is observation and follow-up in 6 months or sooner if necessary.    All questions were answered in this regard accordingly.

## 2024-02-15 PROBLEM — Z90.13 HISTORY OF BILATERAL MASTECTOMY: Status: ACTIVE | Noted: 2024-02-15

## 2024-02-16 ENCOUNTER — TELEPHONE (OUTPATIENT)
Dept: PRIMARY CARE | Facility: CLINIC | Age: 64
End: 2024-02-16
Payer: MEDICAID

## 2024-02-16 ENCOUNTER — HOME CARE VISIT (OUTPATIENT)
Dept: HOME HEALTH SERVICES | Facility: HOME HEALTH | Age: 64
End: 2024-02-16
Payer: MEDICAID

## 2024-02-16 ASSESSMENT — ACTIVITIES OF DAILY LIVING (ADL)
CURRENT_FUNCTION: STAND BY ASSIST
PHYSICAL TRANSFERS ASSESSED: 1
AMBULATION ASSISTANCE ON FLAT SURFACES: 1
AMBULATION ASSISTANCE: 1
AMBULATION ASSISTANCE: STAND BY ASSIST

## 2024-02-16 ASSESSMENT — ENCOUNTER SYMPTOMS
PERSON REPORTING PAIN: PATIENT
DENIES PAIN: 1

## 2024-02-16 NOTE — TELEPHONE ENCOUNTER
----- Message from Ramon Long DO sent at 2/15/2024  7:53 PM EST -----  Please see if patient is able to schedule with me for follow up when able.  Thanks   ----- Message -----  From: Janet Diego  Sent: 2/15/2024   9:25 AM EST  To: Ramon Long DO

## 2024-02-19 ENCOUNTER — OFFICE VISIT (OUTPATIENT)
Dept: WOUND CARE | Facility: CLINIC | Age: 64
End: 2024-02-19
Payer: MEDICAID

## 2024-02-19 PROCEDURE — 11043 DBRDMT MUSC&/FSCA 1ST 20/<: CPT | Performed by: PLASTIC SURGERY

## 2024-02-19 PROCEDURE — 11043 DBRDMT MUSC&/FSCA 1ST 20/<: CPT

## 2024-02-26 ENCOUNTER — APPOINTMENT (OUTPATIENT)
Dept: WOUND CARE | Facility: CLINIC | Age: 64
End: 2024-02-26
Payer: MEDICAID

## 2024-02-28 ENCOUNTER — HOME HEALTH ADMISSION (OUTPATIENT)
Dept: HOME HEALTH SERVICES | Facility: HOME HEALTH | Age: 64
End: 2024-02-28
Payer: MEDICAID

## 2024-02-29 ENCOUNTER — OFFICE VISIT (OUTPATIENT)
Dept: WOUND CARE | Facility: CLINIC | Age: 64
End: 2024-02-29
Payer: MEDICAID

## 2024-02-29 PROCEDURE — 11043 DBRDMT MUSC&/FSCA 1ST 20/<: CPT

## 2024-02-29 PROCEDURE — 11043 DBRDMT MUSC&/FSCA 1ST 20/<: CPT | Performed by: PLASTIC SURGERY

## 2024-03-07 ENCOUNTER — APPOINTMENT (OUTPATIENT)
Dept: WOUND CARE | Facility: CLINIC | Age: 64
End: 2024-03-07
Payer: MEDICAID

## 2024-03-11 ENCOUNTER — OFFICE VISIT (OUTPATIENT)
Dept: WOUND CARE | Facility: CLINIC | Age: 64
End: 2024-03-11
Payer: MEDICAID

## 2024-03-11 PROCEDURE — 11042 DBRDMT SUBQ TIS 1ST 20SQCM/<: CPT | Performed by: PLASTIC SURGERY

## 2024-03-11 PROCEDURE — 11042 DBRDMT SUBQ TIS 1ST 20SQCM/<: CPT

## 2024-03-13 ENCOUNTER — PATIENT OUTREACH (OUTPATIENT)
Dept: PRIMARY CARE | Facility: CLINIC | Age: 64
End: 2024-03-13
Payer: MEDICAID

## 2024-03-18 ENCOUNTER — APPOINTMENT (OUTPATIENT)
Dept: WOUND CARE | Facility: CLINIC | Age: 64
End: 2024-03-18
Payer: MEDICAID

## 2024-03-21 ENCOUNTER — OFFICE VISIT (OUTPATIENT)
Dept: WOUND CARE | Facility: CLINIC | Age: 64
End: 2024-03-21
Payer: MEDICAID

## 2024-03-21 PROCEDURE — 11042 DBRDMT SUBQ TIS 1ST 20SQCM/<: CPT | Performed by: PLASTIC SURGERY

## 2024-03-21 PROCEDURE — 11042 DBRDMT SUBQ TIS 1ST 20SQCM/<: CPT

## 2024-03-28 ENCOUNTER — OFFICE VISIT (OUTPATIENT)
Dept: WOUND CARE | Facility: CLINIC | Age: 64
End: 2024-03-28
Payer: MEDICAID

## 2024-03-28 PROCEDURE — 11043 DBRDMT MUSC&/FSCA 1ST 20/<: CPT | Performed by: PLASTIC SURGERY

## 2024-03-28 PROCEDURE — 11043 DBRDMT MUSC&/FSCA 1ST 20/<: CPT

## 2024-04-03 ENCOUNTER — APPOINTMENT (OUTPATIENT)
Dept: HEMATOLOGY/ONCOLOGY | Facility: CLINIC | Age: 64
End: 2024-04-03
Payer: MEDICAID

## 2024-04-04 ENCOUNTER — OFFICE VISIT (OUTPATIENT)
Dept: WOUND CARE | Facility: CLINIC | Age: 64
End: 2024-04-04
Payer: MEDICAID

## 2024-04-04 PROCEDURE — 99212 OFFICE O/P EST SF 10 MIN: CPT | Performed by: PLASTIC SURGERY

## 2024-04-04 PROCEDURE — 29581 APPL MULTLAYER CMPRN SYS LEG: CPT

## 2024-04-11 ENCOUNTER — LAB REQUISITION (OUTPATIENT)
Dept: LAB | Facility: HOSPITAL | Age: 64
End: 2024-04-11
Payer: MEDICAID

## 2024-04-11 ENCOUNTER — OFFICE VISIT (OUTPATIENT)
Dept: WOUND CARE | Facility: CLINIC | Age: 64
End: 2024-04-11
Payer: MEDICAID

## 2024-04-11 DIAGNOSIS — M86.661: ICD-10-CM

## 2024-04-11 DIAGNOSIS — L97.811 NON-PRESSURE CHRONIC ULCER OF OTHER PART OF RIGHT LOWER LEG LIMITED TO BREAKDOWN OF SKIN (MULTI): ICD-10-CM

## 2024-04-11 DIAGNOSIS — T81.31XD DISRUPTION OF EXTERNAL OPERATION (SURGICAL) WOUND, NOT ELSEWHERE CLASSIFIED, SUBSEQUENT ENCOUNTER: ICD-10-CM

## 2024-04-11 DIAGNOSIS — L97.316: ICD-10-CM

## 2024-04-11 PROCEDURE — 87186 SC STD MICRODIL/AGAR DIL: CPT | Mod: OUT,ELYLAB | Performed by: OBSTETRICS & GYNECOLOGY

## 2024-04-11 PROCEDURE — 11044 DBRDMT BONE 1ST 20 SQ CM/<: CPT

## 2024-04-12 ENCOUNTER — OFFICE VISIT (OUTPATIENT)
Dept: PRIMARY CARE | Facility: CLINIC | Age: 64
End: 2024-04-12
Payer: MEDICAID

## 2024-04-12 VITALS
BODY MASS INDEX: 28.34 KG/M2 | HEART RATE: 80 BPM | SYSTOLIC BLOOD PRESSURE: 122 MMHG | HEIGHT: 62 IN | TEMPERATURE: 97.9 F | DIASTOLIC BLOOD PRESSURE: 87 MMHG | WEIGHT: 154 LBS | RESPIRATION RATE: 18 BRPM | OXYGEN SATURATION: 96 %

## 2024-04-12 DIAGNOSIS — Z00.00 HEALTHCARE MAINTENANCE: ICD-10-CM

## 2024-04-12 DIAGNOSIS — Z78.9 STATIN INTOLERANCE: ICD-10-CM

## 2024-04-12 DIAGNOSIS — R53.83 FATIGUE, UNSPECIFIED TYPE: Primary | ICD-10-CM

## 2024-04-12 DIAGNOSIS — E55.9 VITAMIN D DEFICIENCY: ICD-10-CM

## 2024-04-12 DIAGNOSIS — N18.4 STAGE 4 CHRONIC KIDNEY DISEASE (MULTI): ICD-10-CM

## 2024-04-12 DIAGNOSIS — Z86.718 HISTORY OF DEEP VENOUS THROMBOSIS: ICD-10-CM

## 2024-04-12 DIAGNOSIS — Z85.3 HISTORY OF MALIGNANT NEOPLASM OF BREAST: ICD-10-CM

## 2024-04-12 DIAGNOSIS — R73.9 HYPERGLYCEMIA: ICD-10-CM

## 2024-04-12 DIAGNOSIS — Z86.711 HISTORY OF PULMONARY EMBOLISM: ICD-10-CM

## 2024-04-12 DIAGNOSIS — E78.5 HYPERLIPIDEMIA, UNSPECIFIED HYPERLIPIDEMIA TYPE: ICD-10-CM

## 2024-04-12 DIAGNOSIS — M35.3 PMR (POLYMYALGIA RHEUMATICA) (MULTI): ICD-10-CM

## 2024-04-12 DIAGNOSIS — I10 HYPERTENSION, ESSENTIAL, BENIGN: ICD-10-CM

## 2024-04-12 DIAGNOSIS — M31.6 GCA (GIANT CELL ARTERITIS) (MULTI): ICD-10-CM

## 2024-04-12 PROBLEM — I46.9 CARDIOPULMONARY ARREST (MULTI): Status: RESOLVED | Noted: 2023-06-08 | Resolved: 2024-04-12

## 2024-04-12 PROBLEM — J96.00 ACUTE RESPIRATORY FAILURE (MULTI): Status: RESOLVED | Noted: 2023-10-01 | Resolved: 2024-04-12

## 2024-04-12 LAB — POC HEMOGLOBIN A1C: 5.1 % (ref 4.2–6.5)

## 2024-04-12 PROCEDURE — 83036 HEMOGLOBIN GLYCOSYLATED A1C: CPT | Performed by: FAMILY MEDICINE

## 2024-04-12 PROCEDURE — 3079F DIAST BP 80-89 MM HG: CPT | Performed by: FAMILY MEDICINE

## 2024-04-12 PROCEDURE — 1036F TOBACCO NON-USER: CPT | Performed by: FAMILY MEDICINE

## 2024-04-12 PROCEDURE — 3074F SYST BP LT 130 MM HG: CPT | Performed by: FAMILY MEDICINE

## 2024-04-12 PROCEDURE — 99214 OFFICE O/P EST MOD 30 MIN: CPT | Performed by: FAMILY MEDICINE

## 2024-04-12 RX ORDER — FUROSEMIDE 40 MG/1
40 TABLET ORAL DAILY
COMMUNITY

## 2024-04-12 ASSESSMENT — ENCOUNTER SYMPTOMS: FATIGUE: 1

## 2024-04-12 NOTE — PROGRESS NOTES
"Subjective     Estephania Hernandez is a 63 y.o. female who presents for Fatigue.    Fatigue  Associated symptoms include fatigue.      Pt reports fatigue, low energy levels for approximately one month which started after she and her  became acutely ill with viral gastroenteritis.  She reports having nausea, vomiting, fatigue approximatley one month ago, lasted a few days, overall improved now but still feels tired.  She denies hx of anemia.  No hx of hypothyroidism.  No hx of DM.  She does have hx of DVT/PE, on eliquis.  She has PMR and GCA, on chronic prednisone therapy, hx of CKD 4, sees nephrology.       She was hospitalized in Dec. 2023 for right lower extremity cellulitis which has fully resolved.      She sees wound care for her right lower extremity wound.  She went to wound care center yesterday and the area was cultured. The chronic right leg wound has improved significantly since summer 2023.      She has multiple small round bruises on bilateral lower thighs which started approximately 1 month ago, she is on eliquis.  No hx of recent injury/fall.      Review of Systems   Constitutional:  Positive for fatigue.       Objective     Vitals:    04/12/24 1100   BP: 122/87   BP Location: Right arm   Patient Position: Sitting   Pulse: 80   Resp: 18   Temp: 36.6 °C (97.9 °F)   TempSrc: Temporal   SpO2: 96%   Weight: 69.9 kg (154 lb)   Height: 1.575 m (5' 2\")        Current Outpatient Medications   Medication Instructions    acetaminophen (Tylenol) 325 mg tablet 2 tablets, oral, Every 4 hours PRN    amitriptyline (ELAVIL) 25 mg, oral, Nightly    apixaban (ELIQUIS) 2.5 mg, oral, 2 times daily    aspirin 81 mg EC tablet 1 tablet, oral, Daily    fexofenadine (Allegra) 180 mg tablet 1 tablet, oral, Daily    furosemide (LASIX) 40 mg, oral, Daily    metoprolol tartrate (Lopressor) 50 mg tablet 1 tablet, oral, 2 times daily    NIFEdipine ER (ADALAT CC) 90 mg, oral, 2 times daily    omeprazole (PriLOSEC) 20 mg DR capsule " 1 capsule, oral, Daily    predniSONE (DELTASONE) 20 mg, oral, Daily, With 1mg=21mg    predniSONE (DELTASONE) 1 mg, oral, Daily, With 20mg=21mg        Allergies   Allergen Reactions    House Dust Mite Other, Itching and Shortness of breath    Mold Cough, Itching and Shortness of breath     Itching, watery eyes    Cat Dander Other, Itching and Swelling    Dog Dander Other, Itching and Swelling    Sutures Itching and Swelling    Egg Other    Corn Other     headache    Doxycycline Rash        Past Surgical History:   Procedure Laterality Date    APPENDECTOMY      MASTECTOMY Bilateral     OTHER SURGICAL HISTORY  10/21/2022    Lower leg fracture repair    OTHER SURGICAL HISTORY  10/21/2022    Ankle fracture repair    OTHER SURGICAL HISTORY  10/21/2022    Lower leg fracture repair    OTHER SURGICAL HISTORY  10/21/2022    Colonoscopy    OTHER SURGICAL HISTORY  10/21/2022    Temporal artery biopsy        Social History     Tobacco Use    Smoking status: Former     Current packs/day: 0.00     Types: Cigarettes     Quit date: 1982     Years since quittin.3    Smokeless tobacco: Never   Vaping Use    Vaping status: Former   Substance Use Topics    Alcohol use: Yes     Alcohol/week: 3.0 standard drinks of alcohol     Types: 3 Shots of liquor per week    Drug use: Yes     Types: Marijuana        Social History     Substance and Sexual Activity   Alcohol Use Yes    Alcohol/week: 3.0 standard drinks of alcohol    Types: 3 Shots of liquor per week       Family History   Problem Relation Name Age of Onset    Other (Arteriossclerrosis of nonautologous coronary artery bypass graft without angina pectoris) Sibling          Immunization History   Administered Date(s) Administered    Pfizer COVID-19 vaccine, bivalent, age 12 years and older (30 mcg/0.3 mL) 10/28/2022    Pfizer Purple Cap SARS-CoV-2 2021    Tdap vaccine, age 7 year and older (BOOSTRIX, ADACEL) 2015, 2022        Physical Exam  Vitals reviewed.    Constitutional:       General: She is not in acute distress.     Appearance: Normal appearance. She is well-developed.   HENT:      Head: Normocephalic and atraumatic.   Eyes:      General: Lids are normal.      Conjunctiva/sclera:      Right eye: Right conjunctiva is not injected.      Left eye: Left conjunctiva is not injected.   Cardiovascular:      Rate and Rhythm: Normal rate and regular rhythm.   Pulmonary:      Effort: Pulmonary effort is normal. No respiratory distress.      Breath sounds: Normal breath sounds. No wheezing, rhonchi or rales.   Skin:     General: Skin is warm and dry.      Findings: Bruising (multiple small round bruises noted on bilteral thighs) present.   Neurological:      Mental Status: She is alert and oriented to person, place, and time. Mental status is at baseline.   Psychiatric:         Mood and Affect: Mood normal.         Behavior: Behavior normal.         Problem List Items Addressed This Visit       Hypertension, essential, benign    Relevant Orders    CBC and Auto Differential    GCA (giant cell arteritis) (Multi)    Hyperlipidemia    Relevant Orders    Comprehensive Metabolic Panel    Lipid Panel    PMR (polymyalgia rheumatica) (Multi)    Stage 4 chronic kidney disease (Multi)    Relevant Orders    Comprehensive Metabolic Panel    Lipid Panel    Vitamin D deficiency    Relevant Orders    Vitamin D 25-Hydroxy,Total (for eval of Vitamin D levels)    History of pulmonary embolism    History of malignant neoplasm of breast     Other Visit Diagnoses       Fatigue, unspecified type    -  Primary    Relevant Orders    Iron and TIBC    Ferritin    TSH with reflex to Free T4 if abnormal    Vitamin B12    Folate    Hyperglycemia        Relevant Orders    POCT glycosylated hemoglobin (Hb A1C) manually resulted (Completed)    Statin intolerance        Healthcare maintenance        Relevant Orders    Comprehensive Metabolic Panel    Lipid Panel    CBC and Auto Differential    History of  deep venous thrombosis                Assessment/Plan     Hospitalization at [facility] 12/2023 for acute cellulitis with hx of right distal medial leg 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  Cellulitis resolved.      Fatigue - recent bout of viral gastroenteritis which resolved.  Fatigue has persisted therefore I will check labs, ordered.      Hx of DVT/PE -  on eliquis, sees  heme.      CKD 4 - has single functioning kidney with severe right kidney atrophy, sees nephrologist    Hyperkalemia - she is now taking furosemide and lokelma prescribed by nephrology .  She is NOT on lisinopril    Giant cell arteritis, PMR - on chronic prednisone through rheumatology, Dr. Sanchez    HTN - managed by nephro - on nifedipine 90 BID, metoprolol 50 BID - bp controlled    Breast cancer hx s/p bilateral mastectomy    Cardiomyopathy - Takotsubo, recovered LVEF     Hx of right tibia , fibula fracture - s/p multiple surgeries     right lower leg wound - sees wound care clinic.  S/p wound vac and hyperbaric treatment. No longer on antibiotics.  No longer sees ID.  Her leg wound has significantly improved.     ETD - secondary to hyperbaric treatment - pt has bilateral PE tubes , sees ENT.     HLD - statin intolerance, formerly on repatha through  cardio however too expensive.  Follow up with cardio    Follow up 3 months or sooner if needed

## 2024-04-14 LAB
BACTERIA SPEC CULT: ABNORMAL
GRAM STN SPEC: ABNORMAL
GRAM STN SPEC: ABNORMAL

## 2024-04-17 ENCOUNTER — TELEPHONE (OUTPATIENT)
Dept: HEMATOLOGY/ONCOLOGY | Facility: CLINIC | Age: 64
End: 2024-04-17
Payer: MEDICAID

## 2024-04-18 ENCOUNTER — OFFICE VISIT (OUTPATIENT)
Dept: WOUND CARE | Facility: CLINIC | Age: 64
End: 2024-04-18
Payer: MEDICAID

## 2024-04-18 PROCEDURE — 11043 DBRDMT MUSC&/FSCA 1ST 20/<: CPT

## 2024-04-18 PROCEDURE — 11043 DBRDMT MUSC&/FSCA 1ST 20/<: CPT | Performed by: PLASTIC SURGERY

## 2024-04-19 ENCOUNTER — LAB (OUTPATIENT)
Dept: LAB | Facility: LAB | Age: 64
End: 2024-04-19
Payer: MEDICAID

## 2024-04-19 DIAGNOSIS — I10 HYPERTENSION, ESSENTIAL, BENIGN: ICD-10-CM

## 2024-04-19 DIAGNOSIS — Z00.00 HEALTHCARE MAINTENANCE: ICD-10-CM

## 2024-04-19 DIAGNOSIS — R53.83 FATIGUE, UNSPECIFIED TYPE: ICD-10-CM

## 2024-04-19 DIAGNOSIS — E55.9 VITAMIN D DEFICIENCY: ICD-10-CM

## 2024-04-19 DIAGNOSIS — N18.4 STAGE 4 CHRONIC KIDNEY DISEASE (MULTI): ICD-10-CM

## 2024-04-19 DIAGNOSIS — E78.5 HYPERLIPIDEMIA, UNSPECIFIED HYPERLIPIDEMIA TYPE: ICD-10-CM

## 2024-04-19 LAB
25(OH)D3 SERPL-MCNC: 31 NG/ML (ref 30–100)
ALBUMIN SERPL BCP-MCNC: 4.7 G/DL (ref 3.4–5)
ALP SERPL-CCNC: 70 U/L (ref 33–136)
ALT SERPL W P-5'-P-CCNC: 16 U/L (ref 7–45)
ANION GAP SERPL CALC-SCNC: 18 MMOL/L (ref 10–20)
AST SERPL W P-5'-P-CCNC: 16 U/L (ref 9–39)
BASOPHILS # BLD AUTO: 0.06 X10*3/UL (ref 0–0.1)
BASOPHILS NFR BLD AUTO: 0.5 %
BILIRUB SERPL-MCNC: 0.3 MG/DL (ref 0–1.2)
BUN SERPL-MCNC: 77 MG/DL (ref 6–23)
CALCIUM SERPL-MCNC: 10.3 MG/DL (ref 8.6–10.3)
CHLORIDE SERPL-SCNC: 97 MMOL/L (ref 98–107)
CHOLEST SERPL-MCNC: 498 MG/DL (ref 0–199)
CHOLESTEROL/HDL RATIO: 6.6
CO2 SERPL-SCNC: 28 MMOL/L (ref 21–32)
CREAT SERPL-MCNC: 2.91 MG/DL (ref 0.5–1.05)
EGFRCR SERPLBLD CKD-EPI 2021: 18 ML/MIN/1.73M*2
EOSINOPHIL # BLD AUTO: 0.07 X10*3/UL (ref 0–0.7)
EOSINOPHIL NFR BLD AUTO: 0.6 %
ERYTHROCYTE [DISTWIDTH] IN BLOOD BY AUTOMATED COUNT: 13.3 % (ref 11.5–14.5)
FERRITIN SERPL-MCNC: 57 NG/ML (ref 8–150)
FOLATE SERPL-MCNC: 4.7 NG/ML
GLUCOSE SERPL-MCNC: 90 MG/DL (ref 74–99)
HCT VFR BLD AUTO: 43.9 % (ref 36–46)
HDLC SERPL-MCNC: 75.4 MG/DL
HGB BLD-MCNC: 14.3 G/DL (ref 12–16)
IMM GRANULOCYTES # BLD AUTO: 0.39 X10*3/UL (ref 0–0.7)
IMM GRANULOCYTES NFR BLD AUTO: 3.3 % (ref 0–0.9)
IRON SATN MFR SERPL: 12 % (ref 25–45)
IRON SERPL-MCNC: 52 UG/DL (ref 35–150)
LDLC SERPL CALC-MCNC: 364 MG/DL
LYMPHOCYTES # BLD AUTO: 2.18 X10*3/UL (ref 1.2–4.8)
LYMPHOCYTES NFR BLD AUTO: 18.3 %
MCH RBC QN AUTO: 31.2 PG (ref 26–34)
MCHC RBC AUTO-ENTMCNC: 32.6 G/DL (ref 32–36)
MCV RBC AUTO: 96 FL (ref 80–100)
MONOCYTES # BLD AUTO: 0.91 X10*3/UL (ref 0.1–1)
MONOCYTES NFR BLD AUTO: 7.6 %
NEUTROPHILS # BLD AUTO: 8.32 X10*3/UL (ref 1.2–7.7)
NEUTROPHILS NFR BLD AUTO: 69.7 %
NON HDL CHOLESTEROL: 423 MG/DL (ref 0–149)
NRBC BLD-RTO: 0 /100 WBCS (ref 0–0)
PLATELET # BLD AUTO: 382 X10*3/UL (ref 150–450)
POTASSIUM SERPL-SCNC: 4.3 MMOL/L (ref 3.5–5.3)
PROT SERPL-MCNC: 7.5 G/DL (ref 6.4–8.2)
RBC # BLD AUTO: 4.58 X10*6/UL (ref 4–5.2)
SODIUM SERPL-SCNC: 139 MMOL/L (ref 136–145)
TIBC SERPL-MCNC: 429 UG/DL (ref 240–445)
TRIGL SERPL-MCNC: 291 MG/DL (ref 0–149)
TSH SERPL-ACNC: 2.75 MIU/L (ref 0.44–3.98)
UIBC SERPL-MCNC: 377 UG/DL (ref 110–370)
VIT B12 SERPL-MCNC: 253 PG/ML (ref 211–911)
VLDL: 58 MG/DL (ref 0–40)
WBC # BLD AUTO: 11.9 X10*3/UL (ref 4.4–11.3)

## 2024-04-19 PROCEDURE — 80061 LIPID PANEL: CPT

## 2024-04-19 PROCEDURE — 83550 IRON BINDING TEST: CPT

## 2024-04-19 PROCEDURE — 36415 COLL VENOUS BLD VENIPUNCTURE: CPT

## 2024-04-19 PROCEDURE — 85025 COMPLETE CBC W/AUTO DIFF WBC: CPT

## 2024-04-19 PROCEDURE — 80053 COMPREHEN METABOLIC PANEL: CPT

## 2024-04-19 PROCEDURE — 83540 ASSAY OF IRON: CPT

## 2024-04-19 PROCEDURE — 82607 VITAMIN B-12: CPT

## 2024-04-19 PROCEDURE — 84443 ASSAY THYROID STIM HORMONE: CPT

## 2024-04-19 PROCEDURE — 82306 VITAMIN D 25 HYDROXY: CPT

## 2024-04-19 PROCEDURE — 82746 ASSAY OF FOLIC ACID SERUM: CPT

## 2024-04-19 PROCEDURE — 82728 ASSAY OF FERRITIN: CPT

## 2024-04-23 ENCOUNTER — TELEPHONE (OUTPATIENT)
Dept: PRIMARY CARE | Facility: CLINIC | Age: 64
End: 2024-04-23

## 2024-04-25 ENCOUNTER — OFFICE VISIT (OUTPATIENT)
Dept: WOUND CARE | Facility: CLINIC | Age: 64
End: 2024-04-25
Payer: MEDICAID

## 2024-04-25 PROCEDURE — 11043 DBRDMT MUSC&/FSCA 1ST 20/<: CPT | Performed by: PLASTIC SURGERY

## 2024-04-25 PROCEDURE — 11043 DBRDMT MUSC&/FSCA 1ST 20/<: CPT

## 2024-05-01 ENCOUNTER — OFFICE VISIT (OUTPATIENT)
Dept: HEMATOLOGY/ONCOLOGY | Facility: CLINIC | Age: 64
End: 2024-05-01
Payer: MEDICAID

## 2024-05-01 VITALS
HEART RATE: 96 BPM | DIASTOLIC BLOOD PRESSURE: 76 MMHG | RESPIRATION RATE: 16 BRPM | BODY MASS INDEX: 27.94 KG/M2 | SYSTOLIC BLOOD PRESSURE: 110 MMHG | OXYGEN SATURATION: 98 % | TEMPERATURE: 97.5 F | WEIGHT: 152.78 LBS

## 2024-05-01 DIAGNOSIS — M31.6 GCA (GIANT CELL ARTERITIS) (MULTI): ICD-10-CM

## 2024-05-01 DIAGNOSIS — M85.80 OSTEOPENIA, UNSPECIFIED LOCATION: Primary | ICD-10-CM

## 2024-05-01 DIAGNOSIS — I82.431 ACUTE DEEP VEIN THROMBOSIS (DVT) OF POPLITEAL VEIN OF RIGHT LOWER EXTREMITY (MULTI): ICD-10-CM

## 2024-05-01 DIAGNOSIS — G47.09 OTHER INSOMNIA: ICD-10-CM

## 2024-05-01 DIAGNOSIS — I10 HYPERTENSION, ESSENTIAL, BENIGN: ICD-10-CM

## 2024-05-01 DIAGNOSIS — M35.3 PMR (POLYMYALGIA RHEUMATICA) (MULTI): ICD-10-CM

## 2024-05-01 PROCEDURE — 99214 OFFICE O/P EST MOD 30 MIN: CPT | Performed by: INTERNAL MEDICINE

## 2024-05-01 PROCEDURE — 3074F SYST BP LT 130 MM HG: CPT | Performed by: INTERNAL MEDICINE

## 2024-05-01 PROCEDURE — 3078F DIAST BP <80 MM HG: CPT | Performed by: INTERNAL MEDICINE

## 2024-05-01 RX ORDER — BUTALB/ACETAMINOPHEN/CAFFEINE 50-325-40
1 TABLET ORAL DAILY
COMMUNITY

## 2024-05-01 RX ORDER — PREDNISONE 5 MG/1
5 TABLET ORAL DAILY
COMMUNITY

## 2024-05-01 ASSESSMENT — PAIN SCALES - GENERAL: PAINLEVEL: 0-NO PAIN

## 2024-05-01 NOTE — PATIENT INSTRUCTIONS
You will have doppler of the legs done, and hopefully can then get you off the eliquis  
Debra ACEVES

## 2024-05-02 ENCOUNTER — OFFICE VISIT (OUTPATIENT)
Dept: WOUND CARE | Facility: CLINIC | Age: 64
End: 2024-05-02
Payer: MEDICAID

## 2024-05-02 DIAGNOSIS — I10 HYPERTENSION, ESSENTIAL, BENIGN: ICD-10-CM

## 2024-05-02 PROCEDURE — 11042 DBRDMT SUBQ TIS 1ST 20SQCM/<: CPT | Performed by: PLASTIC SURGERY

## 2024-05-02 PROCEDURE — 11042 DBRDMT SUBQ TIS 1ST 20SQCM/<: CPT

## 2024-05-02 RX ORDER — NIFEDIPINE 90 MG/1
90 TABLET, EXTENDED RELEASE ORAL 2 TIMES DAILY
Qty: 180 TABLET | Refills: 3 | Status: SHIPPED | OUTPATIENT
Start: 2024-05-02

## 2024-05-05 ASSESSMENT — ENCOUNTER SYMPTOMS
RESPIRATORY NEGATIVE: 1
MUSCULOSKELETAL NEGATIVE: 1
CARDIOVASCULAR NEGATIVE: 1
HEMATOLOGIC/LYMPHATIC NEGATIVE: 1
NEUROLOGICAL NEGATIVE: 1
ENDOCRINE NEGATIVE: 1
CONSTITUTIONAL NEGATIVE: 1
PSYCHIATRIC NEGATIVE: 1
GASTROINTESTINAL NEGATIVE: 1
EYES NEGATIVE: 1

## 2024-05-05 NOTE — PROGRESS NOTES
Patient ID: Estephania Hernandez is a 63 y.o. female.  Referring Physician: No referring provider defined for this encounter.  Primary Care Provider: Ramon Long DO  Visit Type: Follow Up      Subjective    HPI  I didn't know I had an ultrasound appointment at the end of March    Review of Systems   Constitutional: Negative.    HENT:  Negative.     Eyes: Negative.    Respiratory: Negative.     Cardiovascular: Negative.    Gastrointestinal: Negative.    Endocrine: Negative.    Genitourinary: Negative.     Musculoskeletal: Negative.    Skin: Negative.    Neurological: Negative.    Hematological: Negative.    Psychiatric/Behavioral: Negative.          Objective   BSA: 1.74 meters squared  /76 (BP Location: Right arm)   Pulse 96   Temp 36.4 °C (97.5 °F) (Temporal)   Resp 16   Wt 69.3 kg (152 lb 12.5 oz)   SpO2 98%   BMI 27.94 kg/m²      has a past medical history of Chronic systolic (congestive) heart failure (Multi) (2023) and Essential (primary) hypertension (12/30/2022).   has a past surgical history that includes Other surgical history (10/21/2022); Other surgical history (10/21/2022); Other surgical history (10/21/2022); Other surgical history (10/21/2022); Other surgical history (10/21/2022); Mastectomy (Bilateral, 2013); and Appendectomy.  Family History   Problem Relation Name Age of Onset    Other (Arteriossclerrosis of nonautologous coronary artery bypass graft without angina pectoris) Sibling       Oncology History    No history exists.       Estephania Hernandez  reports that she quit smoking about 42 years ago. Her smoking use included cigarettes. She has never used smokeless tobacco.  She  reports current alcohol use of about 3.0 standard drinks of alcohol per week.  She  reports current drug use. Drug: Marijuana.    Physical Exam  Vitals reviewed.   Constitutional:       Appearance: Normal appearance.   HENT:      Head: Normocephalic.   Eyes:      Extraocular Movements: Extraocular movements intact.       Pupils: Pupils are equal, round, and reactive to light.   Cardiovascular:      Rate and Rhythm: Normal rate and regular rhythm.      Heart sounds: Normal heart sounds.   Pulmonary:      Breath sounds: Normal breath sounds.   Abdominal:      General: Bowel sounds are normal.      Palpations: Abdomen is soft.   Musculoskeletal:         General: Normal range of motion.      Cervical back: Normal range of motion and neck supple.   Skin:     General: Skin is warm.   Neurological:      General: No focal deficit present.      Mental Status: She is alert and oriented to person, place, and time.   Psychiatric:         Mood and Affect: Mood normal.         Behavior: Behavior normal.         WBC   Date/Time Value Ref Range Status   04/19/2024 06:51 AM 11.9 (H) 4.4 - 11.3 x10*3/uL Final   12/27/2023 07:22 AM 11.9 (H) 4.4 - 11.3 x10*3/uL Final   12/26/2023 06:39 AM 14.5 (H) 4.4 - 11.3 x10*3/uL Final     nRBC   Date Value Ref Range Status   04/19/2024 0.0 0.0 - 0.0 /100 WBCs Final   12/27/2023 0.0 0.0 - 0.0 /100 WBCs Final   12/26/2023 0.0 0.0 - 0.0 /100 WBCs Final     RBC   Date Value Ref Range Status   04/19/2024 4.58 4.00 - 5.20 x10*6/uL Final   12/27/2023 3.20 (L) 4.00 - 5.20 x10*6/uL Final   12/26/2023 3.49 (L) 4.00 - 5.20 x10*6/uL Final     Hemoglobin   Date Value Ref Range Status   04/19/2024 14.3 12.0 - 16.0 g/dL Final   12/27/2023 10.0 (L) 12.0 - 16.0 g/dL Final   12/26/2023 11.0 (L) 12.0 - 16.0 g/dL Final     Hematocrit   Date Value Ref Range Status   04/19/2024 43.9 36.0 - 46.0 % Final   12/27/2023 32.5 (L) 36.0 - 46.0 % Final   12/26/2023 35.8 (L) 36.0 - 46.0 % Final     MCV   Date/Time Value Ref Range Status   04/19/2024 06:51 AM 96 80 - 100 fL Final   12/27/2023 07:22  (H) 80 - 100 fL Final   12/26/2023 06:39  (H) 80 - 100 fL Final     MCH   Date/Time Value Ref Range Status   04/19/2024 06:51 AM 31.2 26.0 - 34.0 pg Final   12/27/2023 07:22 AM 31.3 26.0 - 34.0 pg Final   12/26/2023 06:39 AM 31.5 26.0 -  34.0 pg Final     MCHC   Date/Time Value Ref Range Status   04/19/2024 06:51 AM 32.6 32.0 - 36.0 g/dL Final   12/27/2023 07:22 AM 30.8 (L) 32.0 - 36.0 g/dL Final   12/26/2023 06:39 AM 30.7 (L) 32.0 - 36.0 g/dL Final     RDW   Date/Time Value Ref Range Status   04/19/2024 06:51 AM 13.3 11.5 - 14.5 % Final   12/27/2023 07:22 AM 14.0 11.5 - 14.5 % Final   12/26/2023 06:39 AM 14.4 11.5 - 14.5 % Final     Platelets   Date/Time Value Ref Range Status   04/19/2024 06:51  150 - 450 x10*3/uL Final   12/27/2023 07:22  150 - 450 x10*3/uL Final   12/26/2023 06:39  150 - 450 x10*3/uL Final     MPV   Date/Time Value Ref Range Status   10/30/2023 08:23 AM 10.1 7.5 - 11.5 fL Final   10/29/2023 10:28 AM 10.1 7.5 - 11.5 fL Final   10/28/2023 06:29 AM 10.0 7.5 - 11.5 fL Final     Neutrophils %   Date/Time Value Ref Range Status   04/19/2024 06:51 AM 69.7 40.0 - 80.0 % Final   09/26/2023 08:40 PM 80.4 40.0 - 80.0 % Final   07/26/2023 08:20 AM 74.9 40.0 - 80.0 % Final   05/20/2023 03:51 AM 91.1 40.0 - 80.0 % Final     Immature Granulocytes %, Automated   Date/Time Value Ref Range Status   04/19/2024 06:51 AM 3.3 (H) 0.0 - 0.9 % Final     Comment:     Immature Granulocyte Count (IG) includes promyelocytes, myelocytes and metamyelocytes but does not include bands. Percent differential counts (%) should be interpreted in the context of the absolute cell counts (cells/UL).   12/24/2023 09:28 PM 0.7 0.0 - 0.9 % Final     Comment:     Immature Granulocyte Count (IG) includes promyelocytes, myelocytes and metamyelocytes but does not include bands. Percent differential counts (%) should be interpreted in the context of the absolute cell counts (cells/UL).   09/26/2023 08:40 PM 3.6 (H) 0.0 - 0.9 % Final     Comment:      Immature Granulocyte Count (IG) includes promyelocytes,    myelocytes and metamyelocytes but does not include bands.   Percent differential counts (%) should be interpreted in the   context of the absolute cell  counts (cells/L).     07/26/2023 08:20 AM 3.1 (H) 0.0 - 0.9 % Final     Comment:      Immature Granulocyte Count (IG) includes promyelocytes,    myelocytes and metamyelocytes but does not include bands.   Percent differential counts (%) should be interpreted in the   context of the absolute cell counts (cells/L).     05/20/2023 03:51 AM 1.9 (H) 0.0 - 0.9 % Final     Comment:      Immature Granulocyte Count (IG) includes promyelocytes,    myelocytes and metamyelocytes but does not include bands.   Percent differential counts (%) should be interpreted in the   context of the absolute cell counts (cells/L).       Lymphocytes %, Manual   Date/Time Value Ref Range Status   12/24/2023 09:28 PM 3.0 13.0 - 44.0 % Final     Lymphocytes %   Date/Time Value Ref Range Status   04/19/2024 06:51 AM 18.3 13.0 - 44.0 % Final   09/26/2023 08:40 PM 10.3 13.0 - 44.0 % Final   07/26/2023 08:20 AM 14.4 13.0 - 44.0 % Final   05/20/2023 03:51 AM 1.7 13.0 - 44.0 % Final     Monocytes %, Manual   Date/Time Value Ref Range Status   12/24/2023 09:28 PM 0.0 2.0 - 10.0 % Final     Monocytes %   Date/Time Value Ref Range Status   04/19/2024 06:51 AM 7.6 2.0 - 10.0 % Final   09/26/2023 08:40 PM 5.2 2.0 - 10.0 % Final   07/26/2023 08:20 AM 6.8 2.0 - 10.0 % Final   05/20/2023 03:51 AM 4.9 2.0 - 10.0 % Final     Eosinophils %, Manual   Date/Time Value Ref Range Status   12/24/2023 09:28 PM 0.0 0.0 - 6.0 % Final     Eosinophils %   Date/Time Value Ref Range Status   04/19/2024 06:51 AM 0.6 0.0 - 6.0 % Final   05/20/2023 03:51 AM 0.1 0.0 - 6.0 % Final   05/18/2023 09:56 AM 0.4 0.0 - 6.0 % Final   12/17/2022 08:08 AM 1.0 0.0 - 6.0 % Final     Basophils %, Manual   Date/Time Value Ref Range Status   12/24/2023 09:28 PM 0.0 0.0 - 2.0 % Final     Basophils %   Date/Time Value Ref Range Status   04/19/2024 06:51 AM 0.5 0.0 - 2.0 % Final   09/26/2023 08:40 PM 0.5 0.0 - 2.0 % Final   07/26/2023 08:20 AM 0.8 0.0 - 2.0 % Final   05/20/2023 03:51 AM 0.3 0.0 -  2.0 % Final     Neutrophils Absolute   Date/Time Value Ref Range Status   04/19/2024 06:51 AM 8.32 (H) 1.20 - 7.70 x10*3/uL Final     Comment:     Percent differential counts (%) should be interpreted in the context of the absolute cell counts (cells/uL).   09/26/2023 08:40 PM 8.39 (H) 1.20 - 7.70 x10E9/L Final   07/26/2023 08:20 AM 8.54 (H) 1.20 - 7.70 x10E9/L Final   05/20/2023 03:51 AM 12.92 (H) 1.20 - 7.70 x10E9/L Final     Immature Granulocytes Absolute, Automated   Date/Time Value Ref Range Status   04/19/2024 06:51 AM 0.39 0.00 - 0.70 x10*3/uL Final   12/24/2023 09:28 PM 0.20 0.00 - 0.70 x10*3/uL Final     Lymphocytes Absolute   Date/Time Value Ref Range Status   04/19/2024 06:51 AM 2.18 1.20 - 4.80 x10*3/uL Final   09/26/2023 08:40 PM 1.07 (L) 1.20 - 4.80 x10E9/L Final   07/26/2023 08:20 AM 1.64 1.20 - 4.80 x10E9/L Final   05/20/2023 03:51 AM 0.24 (L) 1.20 - 4.80 x10E9/L Final     Monocytes Absolute   Date/Time Value Ref Range Status   04/19/2024 06:51 AM 0.91 0.10 - 1.00 x10*3/uL Final   09/26/2023 08:40 PM 0.54 0.10 - 1.00 x10E9/L Final   07/26/2023 08:20 AM 0.78 0.10 - 1.00 x10E9/L Final   05/20/2023 03:51 AM 0.69 0.10 - 1.00 x10E9/L Final     Eosinophils Absolute   Date/Time Value Ref Range Status   04/19/2024 06:51 AM 0.07 0.00 - 0.70 x10*3/uL Final   05/20/2023 03:51 AM 0.02 0.00 - 0.70 x10E9/L Final   05/18/2023 09:56 AM 0.07 0.00 - 0.70 x10E9/L Final   12/17/2022 08:08 AM 0.13 0.00 - 0.70 x10E9/L Final     Eosinophils Absolute, Manual   Date/Time Value Ref Range Status   12/24/2023 09:28 PM 0.00 0.00 - 0.70 x10*3/uL Final     Basophils Absolute   Date/Time Value Ref Range Status   04/19/2024 06:51 AM 0.06 0.00 - 0.10 x10*3/uL Final   09/26/2023 08:40 PM 0.05 0.00 - 0.10 x10E9/L Final   07/26/2023 08:20 AM 0.09 0.00 - 0.10 x10E9/L Final   05/20/2023 03:51 AM 0.04 0.00 - 0.10 x10E9/L Final     Basophils Absolute, Manual   Date/Time Value Ref Range Status   12/24/2023 09:28 PM 0.00 0.00 - 0.10 x10*3/uL  "Final       No components found for: \"PT\"  aPTT   Date/Time Value Ref Range Status   09/26/2023 08:40 PM 33 27 - 38 sec Final     Comment:     Note new reference range as of 6/20/2023 at 10:00am.   11/01/2022 08:24 AM 33 26 - 39 sec Final     Comment:       THE APTT IS NO LONGER USED FOR MONITORING     UNFRACTIONATED HEPARIN THERAPY.    FOR MONITORING HEPARIN THERAPY,     USE THE HEPARIN ASSAY.       Medication Documentation Review Audit       Reviewed by Ashley Oliva MA (Medical Assistant) on 05/01/24 at 1414      Medication Order Taking? Sig Documenting Provider Last Dose Status   acetaminophen (Tylenol) 325 mg tablet [id number] Yes Take 2 tablets (650 mg) by mouth every 4 hours if needed. Historical Provider, MD Taking Active   amitriptyline (Elavil) 25 mg tablet [id number] Yes Take 1 tablet (25 mg) by mouth once daily at bedtime. Ramon Long, DO Taking Active   apixaban (Eliquis) 2.5 mg tablet [id number] Yes Take 1 tablet (2.5 mg) by mouth 2 times a day. Ramon Long, DO Taking Active   aspirin 81 mg EC tablet [id number] Yes Take 1 tablet (81 mg) by mouth once daily. Historical Provider, MD Taking Active   calcium citrate-vitamin D3 (Citracal+D) 315 mg-5 mcg (200 unit) tablet [id number] Yes Take 1 tablet by mouth once daily. Historical Provider, MD Taking Active   fexofenadine (Allegra) 180 mg tablet [id number] Yes Take 1 tablet (180 mg) by mouth once daily. Historical Provider, MD Taking Active   furosemide (Lasix) 40 mg tablet [id number] Yes Take 1 tablet (40 mg) by mouth once daily. Historical Provider, MD Taking Active   metoprolol tartrate (Lopressor) 50 mg tablet [id number] Yes Take 1 tablet by mouth 2 times a day. Historical Provider, MD Taking Active   NIFEdipine XL (Procardia XL) 90 mg 24 hr tablet [id number] Yes Take 1 tablet (90 mg) by mouth in the morning and 1 tablet (90 mg) before bedtime. Ramon Long, DO Taking Active   omeprazole (PriLOSEC) 20 mg DR capsule [id number] Yes Take 1 capsule (20 mg) by " mouth once daily. Historical Provider, MD Taking Active   predniSONE (Deltasone) 1 mg tablet [id number] Yes Take 1 tablet (1 mg) by mouth once daily. With 20mg=21mg Historical Provider, MD Taking Active   predniSONE (Deltasone) 20 mg tablet [id number] No Take 1 tablet (20 mg) by mouth once daily. With 1mg=21mg Historical Provider, MD Not Taking Active   predniSONE (Deltasone) 5 mg tablet [id number] Yes Take 1 tablet (5 mg) by mouth once daily. 3Tabs QD Historical Provider, MD Taking Active                     Assessment/Plan    1) recurrent DVT  -had prior provoked DVT/PE (orthopedic surgery), was on fixed duration course of eliquis--I cleared her to come off eliquis and switch to daily baby ASA  -she did understand that any VTE event, regardless of the circumstances, becomes its own risk factor for recurrent VTE  -she also has other risk factors for VTE--persistent relative immobility, chronic inflammatory disorder (PMR and giant cell arteritis)  -she started having problems with her right leg earlier this year (poorly healing wound)--she had PVRs done of the right leg showing no evidence of arterial occlusive disease  -on 5/19/2023 she had a CT of her right leg showing s/p ORIF of distal tibula and fibula fractures, deep soft tissue ulceration suspicious for early osteomyelitis  -doppler of legs done on 5/19/2023 showed no evidence of acute DVT in right leg but cannot rule out thrombus in iliac vein due to dressing; no evidence of acute DVT in left leg--cannot rule out thrombus in iliac and common femoral veins due to cooling catheter  5/23/2023 she had MRI of the tibia showing wound tract extending down into the bone  -8/14/2023 she had CT of right leg showing improvement of deep soft tissue ulceration along medial aspect of distal calf  -on 9/26/2023 her podiatrist sent her for a doppler which showed no DVT in left leg but there was a nonocclusive thrombus in the right popliteal vein extending to the right  posterior tibial and peroneal veins  -she was started by Dr Long on eliquis 5 mg PO BID x 7 days to switch then to 2.5 mg PO BID  -as this was a non-occlusive below knee DVT with low rate of propagation, I agree with start of lower intensity anticoagulation  -she does not have easily modifiable risk factors for recurrent VTE, and 2.5 mg BID eliquis is actually FDA approved as extended (maintenance) dosing for those are considered at high risk for recurrent VTE  -she first needs to be anticoagulated for minimum 3 months  -I advised recheck of duplex at the 3 month anniversary--doppler was booked for 3/31/2024, which she missed  -she is ambivalent about staying on eliquis and says she would rather not have to take it indefinitely-even though she understands that she has multiple risk factors for recurrent VTE (advanced age, prior VTE, multiple chronic inflammatory disorders--PMR, giant cell arteritis)  -advised her to continue on eliquis for now  -will re-order/re-book doppler of her legs        2) osteoporosis  -on IV reclast     3) polymyalgia rheumatica  -on leflunomide  -on prednisone     4) hypertension  -on metoprolol  -on nifedipine     5) insomnia  -on elavil     Problem List Items Addressed This Visit             ICD-10-CM    DVT (deep venous thrombosis) (Multi) I82.409    Relevant Orders    Vascular US lower extremity venous duplex bilateral    Clinic Appointment Request Virtual Est; LUCAS VALLES; [id number]            Lucas Valles MD

## 2024-05-07 DIAGNOSIS — I82.431 ACUTE DEEP VEIN THROMBOSIS (DVT) OF POPLITEAL VEIN OF RIGHT LOWER EXTREMITY (MULTI): ICD-10-CM

## 2024-05-09 ENCOUNTER — HOSPITAL ENCOUNTER (OUTPATIENT)
Dept: CARDIOLOGY | Facility: HOSPITAL | Age: 64
Discharge: HOME | End: 2024-05-09
Payer: MEDICAID

## 2024-05-09 ENCOUNTER — LAB REQUISITION (OUTPATIENT)
Dept: LAB | Facility: HOSPITAL | Age: 64
End: 2024-05-09
Payer: MEDICAID

## 2024-05-09 ENCOUNTER — OFFICE VISIT (OUTPATIENT)
Dept: WOUND CARE | Facility: CLINIC | Age: 64
End: 2024-05-09
Payer: MEDICAID

## 2024-05-09 DIAGNOSIS — L97.811 NON-PRESSURE CHRONIC ULCER OF OTHER PART OF RIGHT LOWER LEG LIMITED TO BREAKDOWN OF SKIN (MULTI): ICD-10-CM

## 2024-05-09 DIAGNOSIS — I82.431 ACUTE DEEP VEIN THROMBOSIS (DVT) OF POPLITEAL VEIN OF RIGHT LOWER EXTREMITY (MULTI): ICD-10-CM

## 2024-05-09 DIAGNOSIS — L97.316: ICD-10-CM

## 2024-05-09 DIAGNOSIS — T81.31XD DISRUPTION OF EXTERNAL OPERATION (SURGICAL) WOUND, NOT ELSEWHERE CLASSIFIED, SUBSEQUENT ENCOUNTER: ICD-10-CM

## 2024-05-09 DIAGNOSIS — M86.661: ICD-10-CM

## 2024-05-09 PROCEDURE — 87186 SC STD MICRODIL/AGAR DIL: CPT | Mod: OUT,ELYLAB | Performed by: PLASTIC SURGERY

## 2024-05-09 PROCEDURE — 93970 EXTREMITY STUDY: CPT | Performed by: SURGERY

## 2024-05-09 PROCEDURE — 11043 DBRDMT MUSC&/FSCA 1ST 20/<: CPT

## 2024-05-09 PROCEDURE — 11043 DBRDMT MUSC&/FSCA 1ST 20/<: CPT | Performed by: PLASTIC SURGERY

## 2024-05-09 PROCEDURE — 93970 EXTREMITY STUDY: CPT

## 2024-05-10 ENCOUNTER — CLINICAL SUPPORT (OUTPATIENT)
Dept: WOUND CARE | Facility: CLINIC | Age: 64
End: 2024-05-10
Payer: MEDICAID

## 2024-05-10 PROCEDURE — 29581 APPL MULTLAYER CMPRN SYS LEG: CPT

## 2024-05-15 ENCOUNTER — TELEMEDICINE (OUTPATIENT)
Dept: HEMATOLOGY/ONCOLOGY | Facility: CLINIC | Age: 64
End: 2024-05-15
Payer: MEDICAID

## 2024-05-15 DIAGNOSIS — M35.3 PMR (POLYMYALGIA RHEUMATICA) (MULTI): ICD-10-CM

## 2024-05-15 DIAGNOSIS — I10 HYPERTENSION, ESSENTIAL, BENIGN: ICD-10-CM

## 2024-05-15 DIAGNOSIS — M85.80 OSTEOPENIA, UNSPECIFIED LOCATION: Primary | ICD-10-CM

## 2024-05-15 DIAGNOSIS — I82.431 ACUTE DEEP VEIN THROMBOSIS (DVT) OF POPLITEAL VEIN OF RIGHT LOWER EXTREMITY (MULTI): ICD-10-CM

## 2024-05-15 DIAGNOSIS — G47.09 OTHER INSOMNIA: ICD-10-CM

## 2024-05-15 PROCEDURE — 99442 PR PHYS/QHP TELEPHONE EVALUATION 11-20 MIN: CPT | Performed by: INTERNAL MEDICINE

## 2024-05-15 NOTE — PATIENT INSTRUCTIONS
You can stop eliquis and switch to daily full dose aspirin (325 mg)    If you develop yet another blood clot event, I would then recommend that you stay on a formal blood thinner indefinitely, but if you prefer once daily dosing, then I would recommend xarelto

## 2024-05-16 ENCOUNTER — OFFICE VISIT (OUTPATIENT)
Dept: WOUND CARE | Facility: CLINIC | Age: 64
End: 2024-05-16
Payer: MEDICAID

## 2024-05-16 PROCEDURE — 11043 DBRDMT MUSC&/FSCA 1ST 20/<: CPT | Performed by: PLASTIC SURGERY

## 2024-05-16 PROCEDURE — 11043 DBRDMT MUSC&/FSCA 1ST 20/<: CPT

## 2024-05-16 ASSESSMENT — ENCOUNTER SYMPTOMS
GASTROINTESTINAL NEGATIVE: 1
RESPIRATORY NEGATIVE: 1
HEMATOLOGIC/LYMPHATIC NEGATIVE: 1
ENDOCRINE NEGATIVE: 1
CARDIOVASCULAR NEGATIVE: 1
PSYCHIATRIC NEGATIVE: 1
MUSCULOSKELETAL NEGATIVE: 1
CONSTITUTIONAL NEGATIVE: 1
EYES NEGATIVE: 1
NEUROLOGICAL NEGATIVE: 1

## 2024-05-17 NOTE — PROGRESS NOTES
Patient ID: Estephania Hernandez is a 63 y.o. female.  Referring Physician: Lucas Valles MD  [id number] [facility] [address]  [place] [address]  Primary Care Provider: Ramon Long DO  Visit Type:  Follow Up     Verbal consent was requested and obtained from patient on this date for a telehealth visit.    Subjective    HPI How was my doppler?    Review of Systems   Constitutional: Negative.    HENT:  Negative.     Eyes: Negative.    Respiratory: Negative.     Cardiovascular: Negative.    Gastrointestinal: Negative.    Endocrine: Negative.    Genitourinary: Negative.     Musculoskeletal: Negative.    Skin: Negative.    Neurological: Negative.    Hematological: Negative.    Psychiatric/Behavioral: Negative.          Objective   BSA: There is no height or weight on file to calculate BSA.  There were no vitals taken for this visit.     has a past medical history of Chronic systolic (congestive) heart failure (Multi) (2023) and Essential (primary) hypertension (12/30/2022).   has a past surgical history that includes Other surgical history (10/21/2022); Other surgical history (10/21/2022); Other surgical history (10/21/2022); Other surgical history (10/21/2022); Other surgical history (10/21/2022); Mastectomy (Bilateral, 2013); and Appendectomy.  Family History   Problem Relation Name Age of Onset    Other (Arteriossclerrosis of nonautologous coronary artery bypass graft without angina pectoris) Sibling       Oncology History    No history exists.       Estephania Hernandez  reports that she quit smoking about 42 years ago. Her smoking use included cigarettes. She has never used smokeless tobacco.  She  reports current alcohol use of about 3.0 standard drinks of alcohol per week.  She  reports current drug use. Drug: Marijuana.    Physical Exam    WBC   Date/Time Value Ref Range Status   04/19/2024 06:51 AM 11.9 (H) 4.4 - 11.3 x10*3/uL Final   12/27/2023 07:22 AM 11.9 (H) 4.4 - 11.3 x10*3/uL Final   12/26/2023 06:39 AM 14.5  (H) 4.4 - 11.3 x10*3/uL Final     nRBC   Date Value Ref Range Status   04/19/2024 0.0 0.0 - 0.0 /100 WBCs Final   12/27/2023 0.0 0.0 - 0.0 /100 WBCs Final   12/26/2023 0.0 0.0 - 0.0 /100 WBCs Final     RBC   Date Value Ref Range Status   04/19/2024 4.58 4.00 - 5.20 x10*6/uL Final   12/27/2023 3.20 (L) 4.00 - 5.20 x10*6/uL Final   12/26/2023 3.49 (L) 4.00 - 5.20 x10*6/uL Final     Hemoglobin   Date Value Ref Range Status   04/19/2024 14.3 12.0 - 16.0 g/dL Final   12/27/2023 10.0 (L) 12.0 - 16.0 g/dL Final   12/26/2023 11.0 (L) 12.0 - 16.0 g/dL Final     Hematocrit   Date Value Ref Range Status   04/19/2024 43.9 36.0 - 46.0 % Final   12/27/2023 32.5 (L) 36.0 - 46.0 % Final   12/26/2023 35.8 (L) 36.0 - 46.0 % Final     MCV   Date/Time Value Ref Range Status   04/19/2024 06:51 AM 96 80 - 100 fL Final   12/27/2023 07:22  (H) 80 - 100 fL Final   12/26/2023 06:39  (H) 80 - 100 fL Final     MCH   Date/Time Value Ref Range Status   04/19/2024 06:51 AM 31.2 26.0 - 34.0 pg Final   12/27/2023 07:22 AM 31.3 26.0 - 34.0 pg Final   12/26/2023 06:39 AM 31.5 26.0 - 34.0 pg Final     MCHC   Date/Time Value Ref Range Status   04/19/2024 06:51 AM 32.6 32.0 - 36.0 g/dL Final   12/27/2023 07:22 AM 30.8 (L) 32.0 - 36.0 g/dL Final   12/26/2023 06:39 AM 30.7 (L) 32.0 - 36.0 g/dL Final     RDW   Date/Time Value Ref Range Status   04/19/2024 06:51 AM 13.3 11.5 - 14.5 % Final   12/27/2023 07:22 AM 14.0 11.5 - 14.5 % Final   12/26/2023 06:39 AM 14.4 11.5 - 14.5 % Final     Platelets   Date/Time Value Ref Range Status   04/19/2024 06:51  150 - 450 x10*3/uL Final   12/27/2023 07:22  150 - 450 x10*3/uL Final   12/26/2023 06:39  150 - 450 x10*3/uL Final     MPV   Date/Time Value Ref Range Status   10/30/2023 08:23 AM 10.1 7.5 - 11.5 fL Final   10/29/2023 10:28 AM 10.1 7.5 - 11.5 fL Final   10/28/2023 06:29 AM 10.0 7.5 - 11.5 fL Final     Neutrophils %   Date/Time Value Ref Range Status   04/19/2024 06:51 AM 69.7 40.0 -  80.0 % Final   09/26/2023 08:40 PM 80.4 40.0 - 80.0 % Final   07/26/2023 08:20 AM 74.9 40.0 - 80.0 % Final   05/20/2023 03:51 AM 91.1 40.0 - 80.0 % Final     Immature Granulocytes %, Automated   Date/Time Value Ref Range Status   04/19/2024 06:51 AM 3.3 (H) 0.0 - 0.9 % Final     Comment:     Immature Granulocyte Count (IG) includes promyelocytes, myelocytes and metamyelocytes but does not include bands. Percent differential counts (%) should be interpreted in the context of the absolute cell counts (cells/UL).   12/24/2023 09:28 PM 0.7 0.0 - 0.9 % Final     Comment:     Immature Granulocyte Count (IG) includes promyelocytes, myelocytes and metamyelocytes but does not include bands. Percent differential counts (%) should be interpreted in the context of the absolute cell counts (cells/UL).   09/26/2023 08:40 PM 3.6 (H) 0.0 - 0.9 % Final     Comment:      Immature Granulocyte Count (IG) includes promyelocytes,    myelocytes and metamyelocytes but does not include bands.   Percent differential counts (%) should be interpreted in the   context of the absolute cell counts (cells/L).     07/26/2023 08:20 AM 3.1 (H) 0.0 - 0.9 % Final     Comment:      Immature Granulocyte Count (IG) includes promyelocytes,    myelocytes and metamyelocytes but does not include bands.   Percent differential counts (%) should be interpreted in the   context of the absolute cell counts (cells/L).     05/20/2023 03:51 AM 1.9 (H) 0.0 - 0.9 % Final     Comment:      Immature Granulocyte Count (IG) includes promyelocytes,    myelocytes and metamyelocytes but does not include bands.   Percent differential counts (%) should be interpreted in the   context of the absolute cell counts (cells/L).       Lymphocytes %, Manual   Date/Time Value Ref Range Status   12/24/2023 09:28 PM 3.0 13.0 - 44.0 % Final     Lymphocytes %   Date/Time Value Ref Range Status   04/19/2024 06:51 AM 18.3 13.0 - 44.0 % Final   09/26/2023 08:40 PM 10.3 13.0 - 44.0 % Final    07/26/2023 08:20 AM 14.4 13.0 - 44.0 % Final   05/20/2023 03:51 AM 1.7 13.0 - 44.0 % Final     Monocytes %, Manual   Date/Time Value Ref Range Status   12/24/2023 09:28 PM 0.0 2.0 - 10.0 % Final     Monocytes %   Date/Time Value Ref Range Status   04/19/2024 06:51 AM 7.6 2.0 - 10.0 % Final   09/26/2023 08:40 PM 5.2 2.0 - 10.0 % Final   07/26/2023 08:20 AM 6.8 2.0 - 10.0 % Final   05/20/2023 03:51 AM 4.9 2.0 - 10.0 % Final     Eosinophils %, Manual   Date/Time Value Ref Range Status   12/24/2023 09:28 PM 0.0 0.0 - 6.0 % Final     Eosinophils %   Date/Time Value Ref Range Status   04/19/2024 06:51 AM 0.6 0.0 - 6.0 % Final   05/20/2023 03:51 AM 0.1 0.0 - 6.0 % Final   05/18/2023 09:56 AM 0.4 0.0 - 6.0 % Final   12/17/2022 08:08 AM 1.0 0.0 - 6.0 % Final     Basophils %, Manual   Date/Time Value Ref Range Status   12/24/2023 09:28 PM 0.0 0.0 - 2.0 % Final     Basophils %   Date/Time Value Ref Range Status   04/19/2024 06:51 AM 0.5 0.0 - 2.0 % Final   09/26/2023 08:40 PM 0.5 0.0 - 2.0 % Final   07/26/2023 08:20 AM 0.8 0.0 - 2.0 % Final   05/20/2023 03:51 AM 0.3 0.0 - 2.0 % Final     Neutrophils Absolute   Date/Time Value Ref Range Status   04/19/2024 06:51 AM 8.32 (H) 1.20 - 7.70 x10*3/uL Final     Comment:     Percent differential counts (%) should be interpreted in the context of the absolute cell counts (cells/uL).   09/26/2023 08:40 PM 8.39 (H) 1.20 - 7.70 x10E9/L Final   07/26/2023 08:20 AM 8.54 (H) 1.20 - 7.70 x10E9/L Final   05/20/2023 03:51 AM 12.92 (H) 1.20 - 7.70 x10E9/L Final     Immature Granulocytes Absolute, Automated   Date/Time Value Ref Range Status   04/19/2024 06:51 AM 0.39 0.00 - 0.70 x10*3/uL Final   12/24/2023 09:28 PM 0.20 0.00 - 0.70 x10*3/uL Final     Lymphocytes Absolute   Date/Time Value Ref Range Status   04/19/2024 06:51 AM 2.18 1.20 - 4.80 x10*3/uL Final   09/26/2023 08:40 PM 1.07 (L) 1.20 - 4.80 x10E9/L Final   07/26/2023 08:20 AM 1.64 1.20 - 4.80 x10E9/L Final   05/20/2023 03:51 AM 0.24  "(L) 1.20 - 4.80 x10E9/L Final     Monocytes Absolute   Date/Time Value Ref Range Status   04/19/2024 06:51 AM 0.91 0.10 - 1.00 x10*3/uL Final   09/26/2023 08:40 PM 0.54 0.10 - 1.00 x10E9/L Final   07/26/2023 08:20 AM 0.78 0.10 - 1.00 x10E9/L Final   05/20/2023 03:51 AM 0.69 0.10 - 1.00 x10E9/L Final     Eosinophils Absolute   Date/Time Value Ref Range Status   04/19/2024 06:51 AM 0.07 0.00 - 0.70 x10*3/uL Final   05/20/2023 03:51 AM 0.02 0.00 - 0.70 x10E9/L Final   05/18/2023 09:56 AM 0.07 0.00 - 0.70 x10E9/L Final   12/17/2022 08:08 AM 0.13 0.00 - 0.70 x10E9/L Final     Eosinophils Absolute, Manual   Date/Time Value Ref Range Status   12/24/2023 09:28 PM 0.00 0.00 - 0.70 x10*3/uL Final     Basophils Absolute   Date/Time Value Ref Range Status   04/19/2024 06:51 AM 0.06 0.00 - 0.10 x10*3/uL Final   09/26/2023 08:40 PM 0.05 0.00 - 0.10 x10E9/L Final   07/26/2023 08:20 AM 0.09 0.00 - 0.10 x10E9/L Final   05/20/2023 03:51 AM 0.04 0.00 - 0.10 x10E9/L Final     Basophils Absolute, Manual   Date/Time Value Ref Range Status   12/24/2023 09:28 PM 0.00 0.00 - 0.10 x10*3/uL Final       No components found for: \"PT\"  aPTT   Date/Time Value Ref Range Status   09/26/2023 08:40 PM 33 27 - 38 sec Final     Comment:     Note new reference range as of 6/20/2023 at 10:00am.   11/01/2022 08:24 AM 33 26 - 39 sec Final     Comment:       THE APTT IS NO LONGER USED FOR MONITORING     UNFRACTIONATED HEPARIN THERAPY.    FOR MONITORING HEPARIN THERAPY,     USE THE HEPARIN ASSAY.       Medication Documentation Review Audit       Reviewed by Ashley Oliva MA (Medical Assistant) on 05/01/24 at 1414      Medication Order Taking? Sig Documenting Provider Last Dose Status   acetaminophen (Tylenol) 325 mg tablet [id number] Yes Take 2 tablets (650 mg) by mouth every 4 hours if needed. Historical Provider, MD Taking Active   amitriptyline (Elavil) 25 mg tablet [id number] Yes Take 1 tablet (25 mg) by mouth once daily at bedtime. Ramon Long, DO " Taking Active   apixaban (Eliquis) 2.5 mg tablet [id number] Yes Take 1 tablet (2.5 mg) by mouth 2 times a day. Ramon Long,  Taking Active   aspirin 81 mg EC tablet [id number] Yes Take 1 tablet (81 mg) by mouth once daily. Historical Provider, MD Taking Active   calcium citrate-vitamin D3 (Citracal+D) 315 mg-5 mcg (200 unit) tablet [id number] Yes Take 1 tablet by mouth once daily. Historical Provider, MD Taking Active   fexofenadine (Allegra) 180 mg tablet [id number] Yes Take 1 tablet (180 mg) by mouth once daily. Historical Provider, MD Taking Active   furosemide (Lasix) 40 mg tablet [id number] Yes Take 1 tablet (40 mg) by mouth once daily. Historical Provider, MD Taking Active   metoprolol tartrate (Lopressor) 50 mg tablet [id number] Yes Take 1 tablet by mouth 2 times a day. Historical Provider, MD Taking Active   NIFEdipine XL (Procardia XL) 90 mg 24 hr tablet [id number] Yes Take 1 tablet (90 mg) by mouth in the morning and 1 tablet (90 mg) before bedtime. Ramon Long DO Taking Active   omeprazole (PriLOSEC) 20 mg DR capsule [id number] Yes Take 1 capsule (20 mg) by mouth once daily. Historical MD Otf Taking Active   predniSONE (Deltasone) 1 mg tablet [id number] Yes Take 1 tablet (1 mg) by mouth once daily. With 20mg=21mg Sonny Vanessa MD Taking Active   predniSONE (Deltasone) 20 mg tablet [id number] No Take 1 tablet (20 mg) by mouth once daily. With 1mg=21mg Sonny Vanessa MD Not Taking Active   predniSONE (Deltasone) 5 mg tablet [id number] Yes Take 1 tablet (5 mg) by mouth once daily. 3Tabs QD Historical Provider, MD Taking Active                   Assessment/Plan    1) recurrent DVT  -had prior provoked DVT/PE (orthopedic surgery), was on fixed duration course of eliquis--I cleared her to come off eliquis and switch to daily baby ASA  -she did understand that any VTE event, regardless of the circumstances, becomes its own risk factor for recurrent VTE  -she also has other risk factors for  VTE--persistent relative immobility, chronic inflammatory disorder (PMR and giant cell arteritis)  -she started having problems with her right leg earlier this year (poorly healing wound)--she had PVRs done of the right leg showing no evidence of arterial occlusive disease  -on 5/19/2023 she had a CT of her right leg showing s/p ORIF of distal tibula and fibula fractures, deep soft tissue ulceration suspicious for early osteomyelitis  -doppler of legs done on 5/19/2023 showed no evidence of acute DVT in right leg but cannot rule out thrombus in iliac vein due to dressing; no evidence of acute DVT in left leg--cannot rule out thrombus in iliac and common femoral veins due to cooling catheter  5/23/2023 she had MRI of the tibia showing wound tract extending down into the bone  -8/14/2023 she had CT of right leg showing improvement of deep soft tissue ulceration along medial aspect of distal calf  -on 9/26/2023 her podiatrist sent her for a doppler which showed no DVT in left leg but there was a nonocclusive thrombus in the right popliteal vein extending to the right posterior tibial and peroneal veins  -she was started by Dr Long on eliquis 5 mg PO BID x 7 days to switch then to 2.5 mg PO BID  -as this was a non-occlusive below knee DVT with low rate of propagation, I agree with start of lower intensity anticoagulation  -she does not have easily modifiable risk factors for recurrent VTE, and 2.5 mg BID eliquis is actually FDA approved as extended (maintenance) dosing for those are considered at high risk for recurrent VTE  -she first needs to be anticoagulated for minimum 3 months  -I advised recheck of duplex at the 3 month anniversary--doppler was booked for 3/31/2024, which she missed  -she is ambivalent about staying on eliquis and says she would rather not have to take it indefinitely-even though she understands that she has multiple risk factors for recurrent VTE (advanced age, prior VTE, multiple chronic  inflammatory disorders--PMR, giant cell arteritis)  -advised her to continue on eliquis for now    -here for interval followup via telephone  -doppler done on 5/9/2024 showed no evidence of acute DVT in right lower extremity; no evidence of acute DVT in left lower extremity  -she says she is not interested in staying on anticoagulation long term  -if she develops a 3rd VTE event, then she for sure would benefit from long term (if not indefinite) anticoagulation  -she thought she could take eliquis just once a day--because of its short half-life, it has to be dosed twice a day; I have advised her to switch to daily adult dose ASA (325 mg)  -she also asked for advice on folate supplements--advised  OTC (Nature Made) folate 400 mcg daily  -she may return as needed        2) osteoporosis  -on IV reclast     3) polymyalgia rheumatica  -on leflunomide  -on prednisone     4) hypertension  -on metoprolol  -on nifedipine     5) insomnia  -on elavil       Problem List Items Addressed This Visit             ICD-10-CM    DVT (deep venous thrombosis) (Multi) I82.409            Lucas Valles MD

## 2024-05-23 ENCOUNTER — OFFICE VISIT (OUTPATIENT)
Dept: WOUND CARE | Facility: CLINIC | Age: 64
End: 2024-05-23
Payer: MEDICAID

## 2024-05-23 ENCOUNTER — APPOINTMENT (OUTPATIENT)
Dept: WOUND CARE | Facility: CLINIC | Age: 64
End: 2024-05-23
Payer: MEDICAID

## 2024-05-23 ENCOUNTER — OFFICE VISIT (OUTPATIENT)
Dept: CARDIOLOGY | Facility: CLINIC | Age: 64
End: 2024-05-23
Payer: MEDICAID

## 2024-05-23 VITALS
BODY MASS INDEX: 28.89 KG/M2 | DIASTOLIC BLOOD PRESSURE: 81 MMHG | WEIGHT: 153 LBS | HEART RATE: 73 BPM | OXYGEN SATURATION: 96 % | HEIGHT: 61 IN | SYSTOLIC BLOOD PRESSURE: 112 MMHG

## 2024-05-23 DIAGNOSIS — I51.81 TAKOTSUBO CARDIOMYOPATHY: ICD-10-CM

## 2024-05-23 DIAGNOSIS — E78.01 HETEROZYGOUS FAMILIAL HYPERCHOLESTEROLEMIA: Primary | ICD-10-CM

## 2024-05-23 DIAGNOSIS — I10 HYPERTENSION, ESSENTIAL, BENIGN: ICD-10-CM

## 2024-05-23 DIAGNOSIS — E78.5 HYPERLIPIDEMIA, UNSPECIFIED HYPERLIPIDEMIA TYPE: ICD-10-CM

## 2024-05-23 PROCEDURE — 3079F DIAST BP 80-89 MM HG: CPT | Performed by: INTERNAL MEDICINE

## 2024-05-23 PROCEDURE — 3074F SYST BP LT 130 MM HG: CPT | Performed by: INTERNAL MEDICINE

## 2024-05-23 PROCEDURE — 99214 OFFICE O/P EST MOD 30 MIN: CPT | Performed by: INTERNAL MEDICINE

## 2024-05-23 PROCEDURE — 1036F TOBACCO NON-USER: CPT | Performed by: INTERNAL MEDICINE

## 2024-05-23 PROCEDURE — 11042 DBRDMT SUBQ TIS 1ST 20SQCM/<: CPT

## 2024-05-23 RX ORDER — ICOSAPENT ETHYL 1 G/1
2 CAPSULE ORAL
Qty: 360 CAPSULE | Refills: 3 | Status: SHIPPED | OUTPATIENT
Start: 2024-05-23 | End: 2025-05-23

## 2024-05-23 RX ORDER — EVOLOCUMAB 140 MG/ML
140 INJECTION, SOLUTION SUBCUTANEOUS
Qty: 6 ML | Refills: 3 | Status: SHIPPED | OUTPATIENT
Start: 2024-05-23 | End: 2025-05-23

## 2024-05-23 ASSESSMENT — ENCOUNTER SYMPTOMS
CONSTITUTIONAL NEGATIVE: 1
PSYCHIATRIC NEGATIVE: 1
ALLERGIC/IMMUNOLOGIC NEGATIVE: 1
EYES NEGATIVE: 1
ENDOCRINE NEGATIVE: 1
DEPRESSION: 0
RESPIRATORY NEGATIVE: 1
GASTROINTESTINAL NEGATIVE: 1
NEUROLOGICAL NEGATIVE: 1
MUSCULOSKELETAL NEGATIVE: 1
HEMATOLOGIC/LYMPHATIC NEGATIVE: 1
OCCASIONAL FEELINGS OF UNSTEADINESS: 0
CARDIOVASCULAR NEGATIVE: 1
LOSS OF SENSATION IN FEET: 0

## 2024-05-23 ASSESSMENT — PATIENT HEALTH QUESTIONNAIRE - PHQ9
SUM OF ALL RESPONSES TO PHQ9 QUESTIONS 1 AND 2: 0
1. LITTLE INTEREST OR PLEASURE IN DOING THINGS: NOT AT ALL
2. FEELING DOWN, DEPRESSED OR HOPELESS: NOT AT ALL

## 2024-05-23 ASSESSMENT — PAIN SCALES - GENERAL: PAINLEVEL: 0-NO PAIN

## 2024-05-23 ASSESSMENT — COLUMBIA-SUICIDE SEVERITY RATING SCALE - C-SSRS
6. HAVE YOU EVER DONE ANYTHING, STARTED TO DO ANYTHING, OR PREPARED TO DO ANYTHING TO END YOUR LIFE?: NO
1. IN THE PAST MONTH, HAVE YOU WISHED YOU WERE DEAD OR WISHED YOU COULD GO TO SLEEP AND NOT WAKE UP?: NO
2. HAVE YOU ACTUALLY HAD ANY THOUGHTS OF KILLING YOURSELF?: NO

## 2024-05-23 NOTE — PROGRESS NOTES
Preventive Cardiology Clinic Note    Reason for Visit: Follow up visit  Referring Clinician: No ref. provider found     History of Present Illness: Mrs. Olson is a 63-year-old woman with severe hypercholesterolemia due to heterozygous familial hypercholesterolemia with intolerance to multiple high intensity statins, complicated by prior Takotsubo cardiomyopathy with reverse remodeling/recovery, hypertension, polymyalgia rheumatica/temporal arteritis, and stage IV CKD without nephrotic syndrome who was referred by her primary care physician for management of severe hypercholesterolemia/heterozygous FH and is here for follow-up.  Since her last visit she had continued issues with cellulitis of her leg requiring multiple rounds of antibiotics and wound care.  During this time her  lost his job and she lost her insurance and so was not able to take the Repatha.  Due to this her LDL cholesterol increased back to drug naïve levels with a calculated LDL cholesterol of 364 mg/dL.  She does not have any exertional chest pain, shortness of breath, palpitations, or syncope.  She does have persistent fatigue ever since a viral illness in March.  She has renewed her insurance and is able to afford the medication again so would like to restart.  After her last visit due to concern over demand ischemia at a prior hospitalization we performed a nuclear stress test which was normal which did not show any evidence for inducible ischemia or infarction and with normal LV systolic function.    Past Medical History:  She has a past medical history of Chronic systolic (congestive) heart failure (Multi) (2023) and Essential (primary) hypertension (12/30/2022).    Past Surgical History:  She has a past surgical history that includes Other surgical history (10/21/2022); Other surgical history (10/21/2022); Other surgical history (10/21/2022); Other surgical history (10/21/2022); Other surgical history (10/21/2022); Mastectomy  (Bilateral, 2013); and Appendectomy.    Social History:  She reports that she quit smoking about 42 years ago. Her smoking use included cigarettes. She has never used smokeless tobacco. She reports current alcohol use of about 3.0 standard drinks of alcohol per week. She reports current drug use. Drug: Marijuana.    Family History:  Family History   Problem Relation Name Age of Onset    Other (Arteriossclerrosis of nonautologous coronary artery bypass graft without angina pectoris) Sibling         Allergies:  House dust mite, Mold, Cat dander, Dog dander, Sutures, Egg, Corn, and Doxycycline    Outpatient Medications:  Current Outpatient Medications   Medication Instructions    acetaminophen (Tylenol) 325 mg tablet 2 tablets, oral, Every 4 hours PRN    amitriptyline (ELAVIL) 25 mg, oral, Nightly    apixaban (ELIQUIS) 2.5 mg, oral, 2 times daily    aspirin 81 mg EC tablet 1 tablet, oral, Daily    calcium citrate-vitamin D3 (Citracal+D) 315 mg-5 mcg (200 unit) tablet 1 tablet, oral, Daily    fexofenadine (Allegra) 180 mg tablet 1 tablet, oral, Daily    furosemide (LASIX) 40 mg, oral, Daily    icosapent ethyL (VASCEPA) 2 g, oral, 2 times daily (morning and late afternoon)    metoprolol tartrate (Lopressor) 50 mg tablet 1 tablet, oral, 2 times daily    NIFEdipine ER (ADALAT CC) 90 mg, oral, 2 times daily    omeprazole (PriLOSEC) 20 mg DR capsule 1 capsule, oral, Daily    predniSONE (DELTASONE) 1 mg, oral, Daily, With 20mg=21mg    predniSONE (DELTASONE) 5 mg, oral, Daily, 3Tabs QD    Repatha SureClick 140 mg, subcutaneous, Every 14 days       Review of Systems:  Review of Systems   Constitutional: Negative.   HENT: Negative.     Eyes: Negative.    Cardiovascular: Negative.    Respiratory: Negative.     Endocrine: Negative.    Hematologic/Lymphatic: Negative.    Skin: Negative.    Musculoskeletal: Negative.    Gastrointestinal: Negative.    Genitourinary: Negative.    Neurological: Negative.    Psychiatric/Behavioral:  "Negative.     Allergic/Immunologic: Negative.        Last Recorded Vitals:  Vitals:    05/23/24 0921   BP: 112/81   BP Location: Left arm   Patient Position: Sitting   Pulse: 73   SpO2: 96%   Weight: 69.4 kg (153 lb)   Height: 1.549 m (5' 1\")       Physical Examination:  General: Well appearing, well-nourished, in no acute distress.  HEENT: Normocephalic atraumatic, pupils equal and reactive to light, extraocular muscles intact, no conjunctival injection, oropharynx clear without exudates.  Neck: Normal carotid arterial pulses, no arterial bruits, no thyromegaly.  Cardiac: Regular rhythm and normal heart rate.  S1, S2 present and normal.  No murmurs, rubs, or gallops.  PMI is nondisplaced. Jugular venous pulsations are normal.  Pulmonary: Normal breath sounds, no increased work of breathing, no wheezes or crackles.  GI: Normal bowel sounds, abdominal aorta not enlarged, no hepatosplenomegaly, no abdominal bruits.  Lower extremities: No cyanosis, clubbing, or edema.  No xanthelasma present. Normal distal pulses.  Skin: Skin intact. No significant rashes or lesions present.  Neuro: Alert and oriented x 3, normal attention and cognition, no focal motor or sensory neurologic deficits.  Psych: Normal affect and mood.  Musculoskeletal: Normal gait normal muscle tone.    Laboratory Studies:  Lab Results   Component Value Date    GLUCOSE 90 04/19/2024    CALCIUM 10.3 04/19/2024     04/19/2024    K 4.3 04/19/2024    CO2 28 04/19/2024    CL 97 (L) 04/19/2024    BUN 77 (H) 04/19/2024    CREATININE 2.91 (H) 04/19/2024     Lab Results   Component Value Date    ALT 16 04/19/2024    AST 16 04/19/2024    ALKPHOS 70 04/19/2024    BILITOT 0.3 04/19/2024         Lab Results   Component Value Date    CHOL 498 (H) 04/19/2024    CHOL 286 (H) 04/14/2023    CHOL 466 (H) 12/17/2022     Lab Results   Component Value Date    HDL 75.4 04/19/2024    HDL 98.5 04/14/2023    .7 12/17/2022     Lab Results   Component Value Date    " "LDLCALC 364 (H) 04/19/2024     Lab Results   Component Value Date    TRIG 291 (H) 04/19/2024    TRIG 278 (H) 04/14/2023    TRIG 232 (H) 12/17/2022     No components found for: \"CHOLHDL\"  Lab Results   Component Value Date    HGBA1C 5.1 04/12/2024       Cardiology Tests:  ECG:  ECG 12 lead 11/02/2023  Sinus tachycardia  Otherwise normal ECG  When compared with ECG of 18-MAY-2023 12:15,  Premature atrial complexes are no longer Present  Confirmed by RAND Pennington ([id number]) on 11/2/2023 3:28:36 AM    Stress Test:  Nuclear Stress Test 07/17/2023  1. Normal stress myocardial perfusion imaging in response to  pharmacologic stress without evidence of ischemia or infarct.  2. Well-maintained left ventricular function with an ejection  fraction of greater than 65%.  3. Normal left ventricular size.      Assessment and Plan:  Problem List Items Addressed This Visit          Cardiac and Vasculature    Hypertension, essential, benign    Hyperlipidemia    Relevant Orders    Lipid Panel    Takotsubo cardiomyopathy    Heterozygous familial hypercholesterolemia - Primary    Relevant Medications    evolocumab (Repatha SureClick) 140 mg/mL injection    icosapent ethyL (Vascepa) 1 gram capsule     Mrs. Olson is a 63-year-old woman with severe hypercholesterolemia due to heterozygous familial hypercholesterolemia with intolerance to multiple high intensity statins, complicated by prior Takotsubo cardiomyopathy with reverse remodeling/recovery, hypertension, polymyalgia rheumatica/temporal arteritis, and stage IV CKD without nephrotic syndrome who was referred by her primary care physician for management of severe hypercholesterolemia/heterozygous FH and is here for follow-up.  Now that she has insurance again I will renew her Repatha prescription and also add icosapent ethyl for additional triglyceride lowering and risk reduction.  I will check a lipid panel in 3 months after initiation of therapy to review the effectiveness.  Her initial " LDL goal should be less than 100 mg/dL and we can consider additional therapy such as bempedoic acid or ezetimibe for additional LDL lowering if needed.  I will contact her with results of the 3-month lipid panel and further recommendations and I will see her again in 1 year here in the office routine follow-up.  Please do not hesitate to call or contact me with questions or concerns.    Palmer Espitia MD, LEIDY, FACC  Director,  [facility]  Director,  [facility] Program  Associate Professor of Medicine  [employer]

## 2024-05-30 ENCOUNTER — OFFICE VISIT (OUTPATIENT)
Dept: WOUND CARE | Facility: CLINIC | Age: 64
End: 2024-05-30
Payer: MEDICAID

## 2024-05-30 PROCEDURE — 11042 DBRDMT SUBQ TIS 1ST 20SQCM/<: CPT

## 2024-06-03 ENCOUNTER — OFFICE VISIT (OUTPATIENT)
Dept: WOUND CARE | Facility: CLINIC | Age: 64
End: 2024-06-03
Payer: MEDICAID

## 2024-06-03 ENCOUNTER — LAB REQUISITION (OUTPATIENT)
Dept: LAB | Facility: HOSPITAL | Age: 64
End: 2024-06-03
Payer: MEDICAID

## 2024-06-03 DIAGNOSIS — M86.661: ICD-10-CM

## 2024-06-03 DIAGNOSIS — L97.316: ICD-10-CM

## 2024-06-03 DIAGNOSIS — T81.31XD DISRUPTION OF EXTERNAL OPERATION (SURGICAL) WOUND, NOT ELSEWHERE CLASSIFIED, SUBSEQUENT ENCOUNTER: ICD-10-CM

## 2024-06-03 DIAGNOSIS — L97.811 NON-PRESSURE CHRONIC ULCER OF OTHER PART OF RIGHT LOWER LEG LIMITED TO BREAKDOWN OF SKIN (MULTI): ICD-10-CM

## 2024-06-03 PROCEDURE — 11043 DBRDMT MUSC&/FSCA 1ST 20/<: CPT

## 2024-06-03 PROCEDURE — 87186 SC STD MICRODIL/AGAR DIL: CPT | Mod: OUT,ELYLAB | Performed by: PLASTIC SURGERY

## 2024-06-03 PROCEDURE — 11043 DBRDMT MUSC&/FSCA 1ST 20/<: CPT | Performed by: PLASTIC SURGERY

## 2024-06-06 LAB
BACTERIA SPEC CULT: ABNORMAL
GRAM STN SPEC: ABNORMAL
GRAM STN SPEC: ABNORMAL

## 2024-06-10 ENCOUNTER — OFFICE VISIT (OUTPATIENT)
Dept: WOUND CARE | Facility: CLINIC | Age: 64
End: 2024-06-10
Payer: MEDICAID

## 2024-06-10 PROCEDURE — 11042 DBRDMT SUBQ TIS 1ST 20SQCM/<: CPT | Performed by: PLASTIC SURGERY

## 2024-06-10 PROCEDURE — 11042 DBRDMT SUBQ TIS 1ST 20SQCM/<: CPT

## 2024-06-17 ENCOUNTER — OFFICE VISIT (OUTPATIENT)
Dept: WOUND CARE | Facility: CLINIC | Age: 64
End: 2024-06-17
Payer: MEDICAID

## 2024-06-17 PROCEDURE — 11043 DBRDMT MUSC&/FSCA 1ST 20/<: CPT

## 2024-06-17 PROCEDURE — 11043 DBRDMT MUSC&/FSCA 1ST 20/<: CPT | Performed by: PLASTIC SURGERY

## 2024-06-17 NOTE — TELEPHONE ENCOUNTER
Result Communication    Called and spoke with patient this afternoon regarding X-ray tibial/fibula R-2 view. An addendum to the XR that was initially read on 10/27/2023 at 8:15pm was added this afternoon at 14:57, unclear why at this time. But the addendum showed There is periosteal reaction and mild cortical irregularity along the distal diaphysis of the medial tibia in the region of the wound VAC.  There is adjacent mild soft tissue swelling.  Findings are concerning for acute osteomyelitis.     I have discussed with Dr. Busby who has been following with patient pt as outpatient, recommend patient to call and see him in the office in 1-2 weeks for evaluation. I have called Mrs. Hernandez and gave her the update on the addendum. Patient did say her leg swelling and erythema has been improving since discharge and pain in the leg has improved as well. She also stated that she has an appointment coming up with orthopedic to discuss possible amputation options. Patient appreciate the call and update, will call Dr. Busby's office to schedule appointment.     3:44 PM      Results were successfully communicated with the patient and they acknowledged their understanding.     rotation 90  Left flexion 155, abduction 120, external rotation 90    Treatment   THERAPEUTIC EXERCISE: 25 minutes):    Exercises per grid below to improve mobility.  Required no verbal cues to promote proper body alignment.  Progressed  none  as indicated.  MANUAL THERAPY: (10 minutes):   Complex decongestive physiotherapy was utilized and necessary because of the patient's  risk of edema .    Date:  6/17/24 Date:   Date:     Activity/Exercise Parameters Parameters Parameters     Wand flexion   X 5     Wand external rotation X 5     Chest stretch X 5     Trunk rotation X 5     Scapular retraction X 5                 Girth and ROM assessed.  She was educated in lymphedema risk reduction.  We requested a prescription for a compression garment.  She will let us know where to fax the prescription.  She will gradually increase to 10 reps and stretch gently.    Access Code: [email or web address]  URL: [email or web address]  Date: 06/17/2024  Prepared by: Alessandra Dickens    Exercises  - Supine Lower Trunk Rotation  - 2 x daily - 7 x weekly - 1 sets - 10 reps - 5 hold  - Supine Chest Stretch with Elbows Bent  - 2 x daily - 7 x weekly - 1 sets - 10 reps - 5 hold  - Supine Shoulder Flexion Extension AAROM with Dowel  - 2 x daily - 7 x weekly - 1 sets - 10 reps - 5 hold  - Supine Shoulder External Rotation with Dowel  - 2 x daily - 7 x weekly - 1 sets - 10 reps - 5 hold  Treatment/Session Summary:    Treatment Assessment:   The patient has enough flexibility to receive radiation at this time.  She will need a compression garment.  Communication/Consultation:   acquire a compression garment  Equipment provided today:   HEP  Recommendations/Intent for next treatment session: Next visit will focus on ROM and girth.    >Total Treatment Billable Duration:  35 minutes   Time In: 0800  Time Out: 0835    ALESSANDRA DICKENS, PT         Charge Capture  [facility]  Appt Desk     Future Appointments   Date Time Provider Department Center

## 2024-06-20 ENCOUNTER — CLINICAL SUPPORT (OUTPATIENT)
Dept: WOUND CARE | Facility: CLINIC | Age: 64
End: 2024-06-20
Payer: MEDICAID

## 2024-06-20 ENCOUNTER — OFFICE VISIT (OUTPATIENT)
Dept: OPHTHALMOLOGY | Facility: CLINIC | Age: 64
End: 2024-06-20
Payer: COMMERCIAL

## 2024-06-20 DIAGNOSIS — H52.03 HYPERMETROPIA OF BOTH EYES: ICD-10-CM

## 2024-06-20 DIAGNOSIS — H52.223 REGULAR ASTIGMATISM OF BOTH EYES: Primary | ICD-10-CM

## 2024-06-20 DIAGNOSIS — H52.4 PRESBYOPIA: ICD-10-CM

## 2024-06-20 DIAGNOSIS — Z96.1 PSEUDOPHAKIA: ICD-10-CM

## 2024-06-20 LAB
KMAX (OD): 51 DIOPTERS
KMAX (OS): 52.3 DIOPTERS
SIMK FLAT (OD): 46 DIOPTERS
SIMK FLAT (OS): 47.4 DIOPTERS
SIMK STEEP (OD): 49.3 DIOPTERS
SIMK STEEP (OS): 50 DIOPTERS
SIMK STEEP AXIS (OD): 121.2 DEGREES
SIMK STEEP AXIS (OS): 55.2 DEGREES

## 2024-06-20 PROCEDURE — 99212 OFFICE O/P EST SF 10 MIN: CPT

## 2024-06-20 PROCEDURE — 92015 DETERMINE REFRACTIVE STATE: CPT | Performed by: OPTOMETRIST

## 2024-06-20 PROCEDURE — 92025 CPTRIZED CORNEAL TOPOGRAPHY: CPT | Performed by: OPTOMETRIST

## 2024-06-20 PROCEDURE — 92004 COMPRE OPH EXAM NEW PT 1/>: CPT | Performed by: OPTOMETRIST

## 2024-06-20 ASSESSMENT — ENCOUNTER SYMPTOMS
ENDOCRINE NEGATIVE: 0
ALLERGIC/IMMUNOLOGIC NEGATIVE: 0
GASTROINTESTINAL NEGATIVE: 0
NEUROLOGICAL NEGATIVE: 0
HEMATOLOGIC/LYMPHATIC NEGATIVE: 0
CONSTITUTIONAL NEGATIVE: 0
CARDIOVASCULAR NEGATIVE: 0
RESPIRATORY NEGATIVE: 0
EYES NEGATIVE: 1
PSYCHIATRIC NEGATIVE: 0
MUSCULOSKELETAL NEGATIVE: 0

## 2024-06-20 ASSESSMENT — REFRACTION
OD_AXIS: 035
OD_SPHERE: +0.50
OD_ADD: +2.75
OS_AXIS: 128
OD_CYLINDER: -3.00
OS_CYLINDER: -3.00
OS_SPHERE: +0.50
OS_ADD: +2.75

## 2024-06-20 ASSESSMENT — SLIT LAMP EXAM - LIDS
COMMENTS: NORMAL, 1+ MGD
COMMENTS: NORMAL, 1+ MGD

## 2024-06-20 ASSESSMENT — TONOMETRY
OS_IOP_MMHG: 14
IOP_METHOD: GOLDMANN APPLANATION
OD_IOP_MMHG: 14

## 2024-06-20 ASSESSMENT — CONF VISUAL FIELD
OS_NORMAL: 1
OD_INFERIOR_NASAL_RESTRICTION: 0
METHOD: COUNTING FINGERS
OS_SUPERIOR_TEMPORAL_RESTRICTION: 0
OD_INFERIOR_TEMPORAL_RESTRICTION: 0
OD_NORMAL: 1
OD_SUPERIOR_NASAL_RESTRICTION: 0
OS_INFERIOR_TEMPORAL_RESTRICTION: 0
OD_SUPERIOR_TEMPORAL_RESTRICTION: 0
OS_SUPERIOR_NASAL_RESTRICTION: 0
OS_INFERIOR_NASAL_RESTRICTION: 0

## 2024-06-20 ASSESSMENT — VISUAL ACUITY
CORRECTION_TYPE: GLASSES
OS_CC: 20/40
OD_CC: 20/30
METHOD: SNELLEN - LINEAR
OD_CC+: -2

## 2024-06-20 ASSESSMENT — REFRACTION_MANIFEST
OD_AXIS: 017
OS_CYLINDER: -3.00
OD_SPHERE: +0.50
OS_SPHERE: -0.25
OD_CYLINDER: -3.00
OS_AXIS: 115
OD_ADD: +2.75
OD_AXIS: 035
OD_CYLINDER: -3.75
OS_ADD: +2.75
OD_SPHERE: +0.50
METHOD_AUTOREFRACTION: 1
OS_CYLINDER: -3.00
OS_SPHERE: +0.75
OS_AXIS: 143

## 2024-06-20 ASSESSMENT — REFRACTION_WEARINGRX
OD_AXIS: 035
OD_ADD: +2.50
OS_ADD: +2.50
OS_AXIS: 131
OS_CYLINDER: -3.00
SPECS_TYPE: BIFOCAL
OD_CYLINDER: -3.50
OD_SPHERE: +1.50
OS_SPHERE: +1.25

## 2024-06-20 ASSESSMENT — EXTERNAL EXAM - LEFT EYE: OS_EXAM: NORMAL

## 2024-06-20 ASSESSMENT — EXTERNAL EXAM - RIGHT EYE: OD_EXAM: NORMAL

## 2024-06-20 ASSESSMENT — CUP TO DISC RATIO
OD_RATIO: 0.4
OS_RATIO: 0.3

## 2024-06-20 NOTE — PROGRESS NOTES
Assessment/Plan   Diagnoses and all orders for this visit:  Regular astigmatism of both eyes  -     Corneal Topography - OU - Both Eyes  Hypermetropia of both eyes  Presbyopia  Pseudophakia  New spec rx released today per patient request. Ocular health wnl for age OU. Monitor 1 year or sooner prn. Refraction billed today. Discussed change may be related to hyperbaric oxygen tx.   Consider work up for BV disorder w/ sensorimotor eval if glasses do not improve.   High, limbus to limbus, slightly irregular astigmatism, no ectasia.

## 2024-06-24 ENCOUNTER — OFFICE VISIT (OUTPATIENT)
Dept: WOUND CARE | Facility: CLINIC | Age: 64
End: 2024-06-24
Payer: MEDICAID

## 2024-06-24 PROCEDURE — 11043 DBRDMT MUSC&/FSCA 1ST 20/<: CPT | Performed by: PLASTIC SURGERY

## 2024-06-24 PROCEDURE — 11043 DBRDMT MUSC&/FSCA 1ST 20/<: CPT

## 2024-06-26 ENCOUNTER — APPOINTMENT (OUTPATIENT)
Dept: OPHTHALMOLOGY | Facility: CLINIC | Age: 64
End: 2024-06-26
Payer: MEDICAID

## 2024-07-01 ENCOUNTER — OFFICE VISIT (OUTPATIENT)
Dept: WOUND CARE | Facility: CLINIC | Age: 64
End: 2024-07-01
Payer: MEDICAID

## 2024-07-01 PROCEDURE — 99212 OFFICE O/P EST SF 10 MIN: CPT

## 2024-07-01 PROCEDURE — 99212 OFFICE O/P EST SF 10 MIN: CPT | Performed by: PLASTIC SURGERY

## 2024-07-08 ENCOUNTER — APPOINTMENT (OUTPATIENT)
Dept: PRIMARY CARE | Facility: CLINIC | Age: 64
End: 2024-07-08
Payer: MEDICAID

## 2024-07-11 ENCOUNTER — OFFICE VISIT (OUTPATIENT)
Dept: WOUND CARE | Facility: CLINIC | Age: 64
End: 2024-07-11
Payer: MEDICAID

## 2024-07-11 PROCEDURE — 99213 OFFICE O/P EST LOW 20 MIN: CPT

## 2024-07-11 PROCEDURE — 99212 OFFICE O/P EST SF 10 MIN: CPT | Performed by: PLASTIC SURGERY

## 2024-08-02 LAB
BACTERIA SPEC CULT: ABNORMAL
GRAM STN SPEC: ABNORMAL
GRAM STN SPEC: ABNORMAL

## 2024-08-12 ENCOUNTER — APPOINTMENT (OUTPATIENT)
Dept: PRIMARY CARE | Facility: CLINIC | Age: 64
End: 2024-08-12
Payer: MEDICAID

## 2024-08-12 VITALS
WEIGHT: 150 LBS | HEART RATE: 76 BPM | SYSTOLIC BLOOD PRESSURE: 116 MMHG | DIASTOLIC BLOOD PRESSURE: 80 MMHG | RESPIRATION RATE: 18 BRPM | TEMPERATURE: 97.6 F | BODY MASS INDEX: 28.32 KG/M2 | HEIGHT: 61 IN | OXYGEN SATURATION: 97 %

## 2024-08-12 DIAGNOSIS — G47.00 INSOMNIA, UNSPECIFIED TYPE: ICD-10-CM

## 2024-08-12 DIAGNOSIS — C44.90 SKIN CANCER: ICD-10-CM

## 2024-08-12 DIAGNOSIS — Z86.711 HISTORY OF PULMONARY EMBOLISM: ICD-10-CM

## 2024-08-12 DIAGNOSIS — Z86.718 HISTORY OF DVT (DEEP VEIN THROMBOSIS): ICD-10-CM

## 2024-08-12 DIAGNOSIS — N18.4 STAGE 4 CHRONIC KIDNEY DISEASE (MULTI): ICD-10-CM

## 2024-08-12 DIAGNOSIS — E78.01 HETEROZYGOUS FAMILIAL HYPERCHOLESTEROLEMIA: Primary | ICD-10-CM

## 2024-08-12 DIAGNOSIS — I10 HYPERTENSION, ESSENTIAL, BENIGN: ICD-10-CM

## 2024-08-12 PROCEDURE — 1036F TOBACCO NON-USER: CPT | Performed by: FAMILY MEDICINE

## 2024-08-12 PROCEDURE — 3074F SYST BP LT 130 MM HG: CPT | Performed by: FAMILY MEDICINE

## 2024-08-12 PROCEDURE — 99214 OFFICE O/P EST MOD 30 MIN: CPT | Performed by: FAMILY MEDICINE

## 2024-08-12 PROCEDURE — 3079F DIAST BP 80-89 MM HG: CPT | Performed by: FAMILY MEDICINE

## 2024-08-12 PROCEDURE — 3008F BODY MASS INDEX DOCD: CPT | Performed by: FAMILY MEDICINE

## 2024-08-12 RX ORDER — AMITRIPTYLINE HYDROCHLORIDE 25 MG/1
25 TABLET, FILM COATED ORAL NIGHTLY
Qty: 90 TABLET | Refills: 3 | Status: SHIPPED | OUTPATIENT
Start: 2024-08-12

## 2024-08-12 ASSESSMENT — ENCOUNTER SYMPTOMS
SHORTNESS OF BREATH: 0
HEADACHES: 0

## 2024-08-12 NOTE — PROGRESS NOTES
"Subjective     Estephania Hernandez is a 63 y.o. female who presents for Hypertension.    HPI     Pt is here for routine follow up on her HTN and insomnia.      She reports her insomnia is well controlled on amitriptyline.  She is requesting a refill today.     Patient sees  cardiology, Dr. Espitia, for hx of severe hypercholesterolemia due to heterozygous familial hypercholesterolemia with statin intolerance.  She also has hx of prior Takotsubo cardiomyopathy with reverse remodeling/recovery, Hypertension, polymyalgia rheumatica/temporal arteritis, and stage IV CKD.  Her repatha was restarted in May 2024 (she now has insurance) and she was also started on vascepa.  She has hx of recurrent DVT, she sees  hematology.      Pt sees  wound care for medial right ankle wound.  She completed hyperbaric treatment in Feb. 2024.  Her right ankle wound has healed. She no longer is required to see wound care.      Pt had recent labs ordered through her [facility] nephrologist.      Review of Systems   Respiratory:  Negative for shortness of breath.    Cardiovascular:  Negative for chest pain.   Neurological:  Negative for headaches.       Objective     Vitals:    08/12/24 1052   BP: 116/80   BP Location: Left arm   Patient Position: Sitting   Pulse: 76   Resp: 18   Temp: 36.4 °C (97.6 °F)   TempSrc: Temporal   SpO2: 97%   Weight: 68 kg (150 lb)   Height: 1.549 m (5' 1\")        Current Outpatient Medications   Medication Instructions    acetaminophen (Tylenol) 325 mg tablet 2 tablets, oral, Every 4 hours PRN    amitriptyline (ELAVIL) 25 mg, oral, Nightly    apixaban (ELIQUIS) 2.5 mg, oral, 2 times daily    aspirin 81 mg EC tablet 1 tablet, oral, Daily    fexofenadine (Allegra) 180 mg tablet 1 tablet, oral, Daily    furosemide (LASIX) 40 mg, oral, Daily    icosapent ethyL (VASCEPA) 2 g, oral, 2 times daily (morning and late afternoon)    metoprolol tartrate (Lopressor) 50 mg tablet 1 tablet, oral, 2 times daily    NIFEdipine ER (ADALAT " CC) 90 mg, oral, 2 times daily    omeprazole (PriLOSEC) 20 mg DR capsule 1 capsule, oral, Daily    predniSONE (DELTASONE) 1 mg, oral, Daily, With 20mg=21mg    predniSONE (DELTASONE) 5 mg, oral, Daily, 3Tabs QD    Repatha SureClick 140 mg, subcutaneous, Every 14 days    sodium zirconium cyclosilicate (LOKELMA) 5 g, oral, Daily        Allergies   Allergen Reactions    House Dust Mite Other, Itching and Shortness of breath    Mold Cough, Itching and Shortness of breath     Itching, watery eyes    Cat Dander Other, Itching and Swelling    Dog Dander Other, Itching and Swelling    Sutures Itching and Swelling    Egg Other    Corn Other     headache    Doxycycline Rash        Past Surgical History:   Procedure Laterality Date    APPENDECTOMY      CATARACT EXTRACTION Bilateral     MASTECTOMY Bilateral     OTHER SURGICAL HISTORY  10/21/2022    Lower leg fracture repair    OTHER SURGICAL HISTORY  10/21/2022    Ankle fracture repair    OTHER SURGICAL HISTORY  10/21/2022    Lower leg fracture repair    OTHER SURGICAL HISTORY  10/21/2022    Colonoscopy    OTHER SURGICAL HISTORY  10/21/2022    Temporal artery biopsy        Social History     Tobacco Use    Smoking status: Former     Current packs/day: 0.00     Types: Cigarettes     Quit date: 1982     Years since quittin.6    Smokeless tobacco: Never   Vaping Use    Vaping status: Former   Substance Use Topics    Alcohol use: Yes     Alcohol/week: 3.0 standard drinks of alcohol     Types: 3 Shots of liquor per week    Drug use: Yes     Types: Marijuana        Social History     Substance and Sexual Activity   Alcohol Use Yes    Alcohol/week: 3.0 standard drinks of alcohol    Types: 3 Shots of liquor per week       Family History   Problem Relation Name Age of Onset    Other (Arteriossclerrosis of nonautologous coronary artery bypass graft without angina pectoris) Sibling      Glaucoma Neg Hx      Macular degeneration Neg Hx          Immunization History    Administered Date(s) Administered    Pfizer COVID-19 vaccine, bivalent, age 12 years and older (30 mcg/0.3 mL) 10/28/2022    Pfizer Purple Cap SARS-CoV-2 04/05/2021, 04/26/2021, 12/28/2021    Tdap vaccine, age 7 year and older (BOOSTRIX, ADACEL) 05/12/2015, 02/13/2022        Physical Exam  Vitals reviewed.   Constitutional:       General: She is not in acute distress.     Appearance: Normal appearance. She is well-developed.   HENT:      Head: Normocephalic and atraumatic.   Eyes:      General: Lids are normal.      Conjunctiva/sclera:      Right eye: Right conjunctiva is not injected.      Left eye: Left conjunctiva is not injected.   Cardiovascular:      Rate and Rhythm: Normal rate and regular rhythm.      Heart sounds: No murmur heard.  Pulmonary:      Effort: Pulmonary effort is normal. No respiratory distress.      Breath sounds: Normal breath sounds. No wheezing, rhonchi or rales.   Skin:     General: Skin is warm and dry.      Findings: No rash.      Comments: Right ankle wound scar noted.  Healed.     Neurological:      Mental Status: She is alert and oriented to person, place, and time. Mental status is at baseline.   Psychiatric:         Mood and Affect: Mood normal.         Behavior: Behavior normal.         Problem List Items Addressed This Visit       Hypertension, essential, benign    Stage 4 chronic kidney disease (Multi)    Insomnia    Relevant Medications    amitriptyline (Elavil) 25 mg tablet    History of pulmonary embolism    Heterozygous familial hypercholesterolemia - Primary    Skin cancer    History of DVT (deep vein thrombosis)       Assessment/Plan     HTN - managed by nephro - on nifedipine 90 BID, metoprolol 50 BID - bp controlled  Hx of DVT/PE -  on eliquis, asa 81.  Pt sees  heme.     Insomnia - controlled on amitriptyline.       CKD 4 - has single functioning kidney with severe right kidney atrophy, sees nephrologist, had recent labs done.  Reviewed. Pt no longer on furosemide.       Hypercalcemia - managed by nephrology.  She was advised to stop her calcium supplement.       Hyperkalemia - she is on lokelma (sodium zirconium cyclosilicate) prescribed by nephrology .  She is NOT on lisinopril or furosemide.      Giant cell arteritis, PMR - on chronic prednisone (13.5 mg daily)  through rheumatology, Dr. Sanchez     Breast cancer hx s/p bilateral mastectomy     Cardiomyopathy - Takotsubo, recovered LVEF - sees cardiology     Hx of right tibia , fibula fracture - s/p multiple surgeries    right lower leg wound - evaluated by wound care clinic.  S/p wound vac and hyperbaric treatment. No longer on antibiotics.  No longer sees ID or wound care.  Her leg wound has healed.     ETD - secondary to hyperbaric treatment - pt has bilateral PE tubes , sees ENT.      Hypercholesterolemia (severe) due to heterozygous familial hypercholesterolemia with statin intolerance.  She also has hx of prior Takotsubo cardiomyopathy with reverse remodeling/recovery.  She is back on repatha as of may 2024.      Hx of SCC of skin of left ankle - removed by derm - in Feb 2024.      Leukocytosis - noted on recent labs from nephrology, likely from being on chronic prednisone.  Pt does not smoke, she is a former smoker.       Follow up 3 months or sooner if needed

## 2024-08-16 ENCOUNTER — APPOINTMENT (OUTPATIENT)
Dept: OTOLARYNGOLOGY | Facility: CLINIC | Age: 64
End: 2024-08-16
Payer: MEDICAID

## 2024-08-21 ENCOUNTER — CLINICAL SUPPORT (OUTPATIENT)
Dept: AUDIOLOGY | Facility: CLINIC | Age: 64
End: 2024-08-21
Payer: MEDICAID

## 2024-08-21 ENCOUNTER — APPOINTMENT (OUTPATIENT)
Dept: OTOLARYNGOLOGY | Facility: CLINIC | Age: 64
End: 2024-08-21
Payer: MEDICAID

## 2024-08-21 DIAGNOSIS — H90.6 MIXED HEARING LOSS, BILATERAL: Primary | ICD-10-CM

## 2024-08-21 DIAGNOSIS — H90.6 MIXED HEARING LOSS, BILATERAL: ICD-10-CM

## 2024-08-21 DIAGNOSIS — H65.02 ACUTE SEROUS OTITIS MEDIA OF LEFT EAR, RECURRENCE NOT SPECIFIED: Primary | ICD-10-CM

## 2024-08-21 PROCEDURE — 92567 TYMPANOMETRY: CPT | Performed by: AUDIOLOGIST

## 2024-08-21 PROCEDURE — 92557 COMPREHENSIVE HEARING TEST: CPT | Performed by: AUDIOLOGIST

## 2024-08-21 PROCEDURE — 99213 OFFICE O/P EST LOW 20 MIN: CPT | Performed by: OTOLARYNGOLOGY

## 2024-08-21 RX ORDER — PREDNISONE 20 MG/1
TABLET ORAL
Qty: 9 TABLET | Refills: 0 | Status: SHIPPED | OUTPATIENT
Start: 2024-08-21 | End: 2024-08-21 | Stop reason: ENTERED-IN-ERROR

## 2024-08-21 NOTE — PROGRESS NOTES
Ms. Hernandez, age 63, was seen today for a hearing evaluation during her ENT follow up with Dr. Ceron.  She has a history of PE tubes due to hyperbaric oxygen treatments which are now extruded and she arrives reporting concern for hearing loss with complaints by family members that she isn't hearing well.    Results:  Otoscopy revealed clear ear canals with left tympanic membrane visualized and right tympanic membrane perforation visualized.  Tympanometry revealed flat, Type B tympanograms.  Flattened tympanogram in her left ear suggests middle ear fluid and in her right ear shows large ear canal volume, consistent with tympanic membrane perforation.  Audiometric thresholds revealed a moderate mixed hearing loss in her left ear and a severe rising to moderate mixed hearing loss in her right ear.  Word recognition scores were excellent bilaterally.    Recommendations:  Follow-up with PCP, Dr. Long, as medically directed.  Follow-up with ENT, Dr. Ceron, as medically directed.  Retest hearing in conjunction with otologic management.

## 2024-08-21 NOTE — PROGRESS NOTES
Estephania Hernandez is a 63 y.o. year old female patient with 6 MONTH FU ON TUBES     Patient presents to the office today for assessment of ears.  Patient is here today for follow-up with known prior history of eustachian tube dysfunction with hyperbaric treatment and previous tubes.  Patient here today with concerns of hearing loss.  She is without other ENT related concerns at this time.        Physical Exam:   General appearance: No acute distress. Normal facies. Symmetric facial movement. No gross lesions of the face are noted.  The external ear structures appear normal.  Right ear noted for tympanic membrane perforation.  This was noted after the previous tube was removed from the canal which was present in cerumen and extruded at the time.  The left tube also extruded and patient as dull appearance of the tympanic membrane.  Anterior rhinoscopy notes essentially a midline nasal septum. Examination is noted for normal healthy mucosal membranes without any evidence of lesions, polyps, or exudate. The tongue is normally mobile. There are no lesions on the gingiva, buccal, or oral mucosa. There are no oral cavity masses.  The neck is negative for mass lymphadenopathy. The trachea and parotid are clear. The thyroid bed is grossly unremarkable. The salivary gland structures are grossly unremarkable. The laryngeal structures are noted for grossly normal appearance. The true vocal cords, the false focal cords, and the remaining structures including the epiglottis, piriform sinuses, and base of tongue are all grossly normal. There is no evidence of gross mass nodule, polyp, tumor or other defect.     Audiogram  Audiogram demonstrates bilateral mixed hearing loss with left ear with a moderate mixed loss in right ear with severe rising to moderate mixed loss.  At 2000 Hz patient also has notch present which may represent Carhart notch.        Assessment/Plan   1.  Bilateral mixed hearing loss    Patient with bilateral mixed  hearing loss.  The tubes were extruded and removed from the canals today.  Patient with perforation right and effusion left.  We will see the patient back in 1 month and if not improved may consider placement of a new PE tube in the left.   the patient is already currently taking oral prednisone.

## 2024-09-25 ENCOUNTER — APPOINTMENT (OUTPATIENT)
Dept: OTOLARYNGOLOGY | Facility: CLINIC | Age: 64
End: 2024-09-25
Payer: MEDICAID

## 2024-09-25 DIAGNOSIS — H72.91 PERFORATION OF RIGHT TYMPANIC MEMBRANE: ICD-10-CM

## 2024-09-25 DIAGNOSIS — H65.02 NON-RECURRENT ACUTE SEROUS OTITIS MEDIA OF LEFT EAR: Primary | ICD-10-CM

## 2024-09-25 PROCEDURE — 99213 OFFICE O/P EST LOW 20 MIN: CPT | Performed by: OTOLARYNGOLOGY

## 2024-09-25 NOTE — PROGRESS NOTES
Patient returns.  Seeing her back today follow-up check on her ears.  Has history of HBO with need for tubes.  Doing much better with the left ear.  Previous effusion was noted.  There have been no significant changes in past medical or past surgical histories except as mentioned.    Exam:  No acute distress.  Examination of the left ear is normal. The middle ear, tympanic membrane, external auditory canal, and external ear itself are grossly unremarkable without evidence of active disease.  Right ear noted for inferior perforation clean and dry.  No worrisome findings noted.  Nasal exam is clear anteriorly. The septum is relatively straight and the nasal mucosa is grossly unremarkable without evidence of any worrisome infection or polyp or mass. The oral cavity and oropharynx are unremarkable. There is no evidence of any gross lesion or mucosal irregularity throughout the structures. The neck is negative for mass or lymphadenopathy. The trachea and parotid region are clear free of obvious mass or tumor. Facial structures are grossly otherwise unremarkable as well.    Assessment and plan:  History of ear tubes for HBO therapy.  Left ear had previous effusion now resolved.  Favor observation for that ear.  Recommend surveillance recheck on the right ear in about 3 months to see if perforation gets any smaller.  May perform patch myringoplasty in the office depending how it is faring.  All questions were answered in this regard accordingly.

## 2024-11-08 DIAGNOSIS — I82.431 ACUTE DEEP VEIN THROMBOSIS (DVT) OF POPLITEAL VEIN OF RIGHT LOWER EXTREMITY (MULTI): ICD-10-CM

## 2024-12-22 NOTE — TELEPHONE ENCOUNTER
Pt called for lab results. She also wants to know if we received labs from her Rheumatologist ([facility]). She has no idea what the labs coming from rheumatology mean, she is stating that she was not notified. She has a hard time looking them up on [employer] and doesn't know what they mean. She is asking for a call back, please advise.    no

## 2024-12-30 ENCOUNTER — APPOINTMENT (OUTPATIENT)
Dept: OTOLARYNGOLOGY | Facility: CLINIC | Age: 64
End: 2024-12-30
Payer: MEDICAID

## 2025-01-24 ENCOUNTER — CLINICAL SUPPORT (OUTPATIENT)
Dept: AUDIOLOGY | Facility: CLINIC | Age: 65
End: 2025-01-24
Payer: MEDICAID

## 2025-01-24 ENCOUNTER — APPOINTMENT (OUTPATIENT)
Dept: OTOLARYNGOLOGY | Facility: CLINIC | Age: 65
End: 2025-01-24
Payer: MEDICAID

## 2025-01-24 DIAGNOSIS — H90.A31 MIXED CONDUCTIVE AND SENSORINEURAL HEARING LOSS OF RIGHT EAR WITH RESTRICTED HEARING OF LEFT EAR: ICD-10-CM

## 2025-01-24 DIAGNOSIS — H90.A22 SENSORINEURAL HEARING LOSS (SNHL) OF LEFT EAR WITH RESTRICTED HEARING OF RIGHT EAR: ICD-10-CM

## 2025-01-24 DIAGNOSIS — H90.A31 MIXED CONDUCTIVE AND SENSORINEURAL HEARING LOSS OF RIGHT EAR WITH RESTRICTED HEARING OF LEFT EAR: Primary | ICD-10-CM

## 2025-01-24 DIAGNOSIS — H72.91 PERFORATION OF RIGHT TYMPANIC MEMBRANE: Primary | ICD-10-CM

## 2025-01-24 PROCEDURE — 92553 AUDIOMETRY AIR & BONE: CPT | Performed by: AUDIOLOGIST

## 2025-01-24 PROCEDURE — 92567 TYMPANOMETRY: CPT | Performed by: AUDIOLOGIST

## 2025-01-24 PROCEDURE — 99213 OFFICE O/P EST LOW 20 MIN: CPT | Performed by: OTOLARYNGOLOGY

## 2025-01-24 NOTE — PROGRESS NOTES
Patient returns.  We are seeing her back today for recheck on arrears.  She has a history of tubes for HBO therapy.  Still not hearing quite as well in that right ear.  The remaining ENT inquiry is otherwise unremarkable.  No other change in past medical surgical history.    Exam:  No acute distress.  Examination of the left ear is normal. The middle ear, tympanic membrane, external auditory canal, and external ear itself are grossly unremarkable without evidence of active disease.  Right ear noted for small residual perforation otherwise unremarkable.  Nasal exam is clear anteriorly. The septum is relatively straight and the nasal mucosa is grossly unremarkable without evidence of any worrisome infection or polyp or mass. The oral cavity and oropharynx are unremarkable. There is no evidence of any gross lesion or mucosal irregularity throughout the structures. The neck is negative for mass or lymphadenopathy. The trachea and parotid region are clear free of obvious mass or tumor. Facial structures are grossly otherwise unremarkable as well.  Audiogram:  Bilateral sensorineural hearing loss down to 60+ decibels with a mixed component for the right ear predominantly with greatest differential about 40 dB for the lowest frequencies.    Assessment and plan:  History of tubes for HBO.  Doing otherwise okay with small residual perforation noted.  I cannot fully explain the predominance of a conductive component in that ear and low frequencies but based on her nerve loss the best opportunity for her at this point is still consideration of amplification and therefore she may pursue hearing aids at any time.  I will recheck her in 6 months, sooner with issue.  All questions were answered in this regard accordingly.

## 2025-01-24 NOTE — PROGRESS NOTES
Mrs. Hernandez, age 64, returned today for a hearing evaluation during her ENT follow up with Dr. Ceron.  She reported concern for hearing loss, right greater than left noting significant difficulties hearing conversation in any noise, such as when she is in the kitchen baking and the microwave or mixer is on.    Results:  Otoscopy revealed clear ear canals with tympanic membrane visualized in her left ear and tympanic membrane perforation in her right ear.  Tympanometry revealed a normal, Type A tympanogram in her left ear, indicating normal ear canal volume, peak pressure and compliance and a flat, Type B tympanogram with large ear canal volume in her right ear, consistent with known tympanic membrane perforation.  Audiometric thresholds revealed moderate essentially sensorineural hearing loss in her left ear and a moderate to severe mixed hearing loss in her right ear.    Recommendations:  Follow-up with PCP, Dr. Long, as medically directed.  Follow-up with ENT, Dr. Ceron, as medically directed.  Recommend binaural amplification pending medical clearance.  Retest hearing as directed in conjunction with otologic care or annually to monitor.

## 2025-01-31 ENCOUNTER — APPOINTMENT (OUTPATIENT)
Facility: CLINIC | Age: 65
End: 2025-01-31
Payer: MEDICAID

## 2025-04-22 ENCOUNTER — APPOINTMENT (OUTPATIENT)
Facility: CLINIC | Age: 65
End: 2025-04-22
Payer: MEDICAID

## 2025-04-22 VITALS
HEIGHT: 61 IN | OXYGEN SATURATION: 95 % | WEIGHT: 149 LBS | TEMPERATURE: 97.5 F | DIASTOLIC BLOOD PRESSURE: 73 MMHG | BODY MASS INDEX: 28.13 KG/M2 | RESPIRATION RATE: 18 BRPM | SYSTOLIC BLOOD PRESSURE: 111 MMHG | HEART RATE: 79 BPM

## 2025-04-22 DIAGNOSIS — Z86.711 HISTORY OF PULMONARY EMBOLISM: ICD-10-CM

## 2025-04-22 DIAGNOSIS — M31.6 TEMPORAL ARTERITIS (MULTI): ICD-10-CM

## 2025-04-22 DIAGNOSIS — N18.4 STAGE 4 CHRONIC KIDNEY DISEASE (MULTI): ICD-10-CM

## 2025-04-22 DIAGNOSIS — E78.5 HYPERLIPIDEMIA, UNSPECIFIED HYPERLIPIDEMIA TYPE: ICD-10-CM

## 2025-04-22 DIAGNOSIS — M35.3 PMR (POLYMYALGIA RHEUMATICA) (MULTI): ICD-10-CM

## 2025-04-22 DIAGNOSIS — Z86.718 HISTORY OF DVT (DEEP VEIN THROMBOSIS): ICD-10-CM

## 2025-04-22 DIAGNOSIS — Z90.13 HISTORY OF BILATERAL MASTECTOMY: ICD-10-CM

## 2025-04-22 DIAGNOSIS — I10 HYPERTENSION, ESSENTIAL, BENIGN: ICD-10-CM

## 2025-04-22 DIAGNOSIS — Q60.2 ABSENT KIDNEY, CONGENITAL: ICD-10-CM

## 2025-04-22 DIAGNOSIS — Z86.74 HISTORY OF CARDIAC ARREST: ICD-10-CM

## 2025-04-22 DIAGNOSIS — Z13.220 LIPID SCREENING: ICD-10-CM

## 2025-04-22 DIAGNOSIS — I51.81 TAKOTSUBO CARDIOMYOPATHY: ICD-10-CM

## 2025-04-22 DIAGNOSIS — Z12.11 COLON CANCER SCREENING: ICD-10-CM

## 2025-04-22 DIAGNOSIS — Z85.3 HISTORY OF MALIGNANT NEOPLASM OF BREAST: ICD-10-CM

## 2025-04-22 DIAGNOSIS — Z00.00 HEALTHCARE MAINTENANCE: Primary | ICD-10-CM

## 2025-04-22 PROCEDURE — 3074F SYST BP LT 130 MM HG: CPT | Performed by: FAMILY MEDICINE

## 2025-04-22 PROCEDURE — 3078F DIAST BP <80 MM HG: CPT | Performed by: FAMILY MEDICINE

## 2025-04-22 PROCEDURE — 99396 PREV VISIT EST AGE 40-64: CPT | Performed by: FAMILY MEDICINE

## 2025-04-22 PROCEDURE — 1036F TOBACCO NON-USER: CPT | Performed by: FAMILY MEDICINE

## 2025-04-22 PROCEDURE — 3008F BODY MASS INDEX DOCD: CPT | Performed by: FAMILY MEDICINE

## 2025-04-22 ASSESSMENT — PATIENT HEALTH QUESTIONNAIRE - PHQ9
2. FEELING DOWN, DEPRESSED OR HOPELESS: NOT AT ALL
SUM OF ALL RESPONSES TO PHQ9 QUESTIONS 1 AND 2: 0
1. LITTLE INTEREST OR PLEASURE IN DOING THINGS: NOT AT ALL

## 2025-04-22 ASSESSMENT — ENCOUNTER SYMPTOMS
HEADACHES: 0
SHORTNESS OF BREATH: 0

## 2025-04-22 NOTE — PROGRESS NOTES
"Subjective     Estephania Hernandez is a 64 y.o. female who presents for Annual Exam.    HPI     Pt is here today for annual well exam.     Patient has history of severe hypercholesterolemia due to heterozygous familial hypercholesterolemia with intolerance to multiple high intensity statins, Takotsubo cardiomyopathy with reverse remodeling/recovery, hypertension, polymyalgia rheumatica/temporal arteritis (on chronic prednisone therapy), stage IV CKD, right chronic leg wound (which has healed), hx of DVT/PE, on eliquis.    Cardiologist - Dr. Espitia - severe familial hyperlipidemia, statin intolerant, on repatha.    Nephrologist - Dr. German Tobar - CKD 4  Rheum - Dr. Sanchez - Temporal arteritis - on chronic prednisone     Pt had recent labs done from her nephrologist last month.  Her total wbc count was slightly elevated, likely secondary to her daily prednisone use.  Pt is on 13 mg of prednisone daily.      Pt has hx of osteopenia/osteoporosis.  She sees rheum (Dr. Sanchez at [facility]) to get her dexa scans done.  She has been on bisphosphonates in the past which were stopped due to worsening kidney function (hx of CKD 4).  She discussed prolia with her rheumatologist in the past.  She is not currently taking a medication for osteoporosis.             Review of Systems   Respiratory:  Negative for shortness of breath.    Cardiovascular:  Negative for chest pain.   Neurological:  Negative for headaches.       Objective     Vitals:    04/22/25 1309   BP: 111/73   BP Location: Left arm   Patient Position: Sitting   Pulse: 79   Resp: 18   Temp: 36.4 °C (97.5 °F)   TempSrc: Temporal   SpO2: 95%   Weight: 67.6 kg (149 lb)   Height: 1.549 m (5' 1\")        Current Outpatient Medications   Medication Instructions    acetaminophen (Tylenol) 325 mg tablet 2 tablets, Every 4 hours PRN    amitriptyline (ELAVIL) 25 mg, oral, Nightly    apixaban (ELIQUIS) 2.5 mg, oral, 2 times daily    aspirin 81 mg EC tablet 1 tablet, Daily "    fexofenadine (Allegra) 180 mg tablet 1 tablet, Daily    furosemide (LASIX) 40 mg, Daily    icosapent ethyL (VASCEPA) 2 g, oral, 2 times daily (morning and late afternoon)    metoprolol tartrate (Lopressor) 50 mg tablet 1 tablet, 2 times daily    NIFEdipine ER (ADALAT CC) 90 mg, oral, 2 times daily    omeprazole (PriLOSEC) 20 mg DR capsule 1 capsule, Daily    predniSONE (DELTASONE) 1 mg, Daily    predniSONE (DELTASONE) 5 mg, Daily    Repatha SureClick 140 mg, subcutaneous, Every 14 days    sodium zirconium cyclosilicate (LOKELMA) 5 g, Daily        RX Allergies[1]     Surgical History[2]     Social History[3]     Family History[4]     Immunization History   Administered Date(s) Administered    COVID-19, mRNA, LNP-S, PF, 30 mcg/0.3 mL dose 04/05/2021, 04/26/2021    Pfizer COVID-19 vaccine, bivalent, age 12 years and older (30 mcg/0.3 mL) 10/28/2022    Pfizer Purple Cap SARS-CoV-2 12/28/2021, 04/04/2022    SARS-CoV-2, Unspecified 12/24/2021, 10/20/2022    Tdap vaccine, age 7 year and older (BOOSTRIX, ADACEL) 05/12/2015, 02/13/2022        Lab Results   Component Value Date    WBC 11.9 (H) 04/19/2024    RBC 4.58 04/19/2024    HGB 14.3 04/19/2024    HCT 43.9 04/19/2024    MCV 96 04/19/2024    MCH 31.2 04/19/2024    MCHC 32.6 04/19/2024     04/19/2024    MPV 10.1 10/30/2023     Lab Results   Component Value Date    GLUCOSE 90 04/19/2024     04/19/2024    K 4.3 04/19/2024    CL 97 (L) 04/19/2024    CO2 28 04/19/2024    ANIONGAP 18 04/19/2024    BUN 77 (H) 04/19/2024    CREATININE 2.91 (H) 04/19/2024    GFRF 19 (A) 09/26/2023    CALCIUM 10.3 04/19/2024    ALBUMIN 4.7 04/19/2024    ALKPHOS 70 04/19/2024    PROT 7.5 04/19/2024    AST 16 04/19/2024    BILITOT 0.3 04/19/2024    ALT 16 04/19/2024      Lab Results   Component Value Date    CHOL 498 (H) 04/19/2024    HDL 75.4 04/19/2024    CHHDL 6.6 04/19/2024    LDLCALC 364 (H) 04/19/2024    VLDL 58 (H) 04/19/2024    TRIG 291 (H) 04/19/2024      Lab Results    Component Value Date    TSH 2.75 04/19/2024      Lab Results   Component Value Date    VITD25 31 04/19/2024      Lab Results   Component Value Date    HGBA1C 5.3 03/07/2025    QXPHIGVH0Q 105 03/07/2025       Physical Exam  Vitals reviewed.   Constitutional:       General: She is not in acute distress.     Appearance: Normal appearance. She is not ill-appearing.   HENT:      Head: Normocephalic and atraumatic.      Right Ear: Tympanic membrane, ear canal and external ear normal.      Left Ear: Tympanic membrane, ear canal and external ear normal.      Mouth/Throat:      Mouth: Mucous membranes are moist.      Pharynx: No oropharyngeal exudate or posterior oropharyngeal erythema.   Neck:      Thyroid: No thyroid mass or thyromegaly.   Cardiovascular:      Rate and Rhythm: Normal rate and regular rhythm.      Heart sounds: Murmur heard.   Pulmonary:      Effort: No respiratory distress.      Breath sounds: Normal breath sounds. No wheezing, rhonchi or rales.   Abdominal:      General: There is no distension.      Palpations: Abdomen is soft.      Tenderness: There is no abdominal tenderness.   Lymphadenopathy:      Cervical: No cervical adenopathy.   Skin:     General: Skin is warm and dry.      Findings: No rash.      Comments: Right lower leg chronic wound scar noted.     Neurological:      Mental Status: She is alert and oriented to person, place, and time. Mental status is at baseline.   Psychiatric:         Mood and Affect: Mood normal.         Behavior: Behavior normal.         Assessment & Plan  Healthcare maintenance  Well Exam     Pap testing - declined.      Mammogram - hx of breast cancer s/p bilateral mastectomy.      Dexa - per rheumatologist. Hx of osteopenia/osteoporosis, cannot take bisphosphonates due to CKD 4 per rheum.  She has discussed prolia with rheum in the past.  She should follow up with rheumatology to see what is recommended.       Colon screening - Colonoscopy ordered.     Vaccines -  shingrix recommended, pt will go to pharmacy     Prevnar 20 recommended , pt will go to pharmacy     I recommend yearly flu vaccine and routine COVID vaccinations as indicated     Pt had labs done last month (march 2025) - reviewed .  Pt due for lipid panel, ordered     Healthy diet, routine exercise was discussed with patient            Hypertension, essential, benign  Controlled        Hyperlipidemia, unspecified hyperlipidemia type  Statin intolerant.  On repatha through cardiology, Dr. Espitia.   Orders:    Lipid Panel; Future    History of pulmonary embolism  On eliquis        History of DVT (deep vein thrombosis)  On eliquis       Stage 4 chronic kidney disease (Multi)  Sees nephrology  Hx of congenital absent kidney       Temporal arteritis ([facility])  Sees rheumatology, on prednisone.    Pt gets dexa scans through rheum        Takotsubo cardiomyopathy         Lipid screening         History of bilateral mastectomy    Orders:    Referral to Breast Surgery; Future    History of malignant neoplasm of breast    Orders:    Referral to Breast Surgery; Future    PMR (polymyalgia rheumatica) (Multi)  Sees rheum       Absent kidney, congenital         History of cardiac arrest         Colon cancer screening    Orders:    Colonoscopy Screening; Average Risk Patient; Future                        [1]   Allergies  Allergen Reactions    House Dust Mite Other, Itching and Shortness of breath    Mold Cough, Itching and Shortness of breath     Itching, watery eyes    Cat Dander Other, Itching and Swelling    Dog Dander Other, Itching and Swelling    Sutures Itching and Swelling    Egg Other    Corn Other     headache    Doxycycline Rash   [2]   Past Surgical History:  Procedure Laterality Date    APPENDECTOMY      CATARACT EXTRACTION Bilateral     MASTECTOMY Bilateral 2013    OTHER SURGICAL HISTORY  10/21/2022    Lower leg fracture repair    OTHER SURGICAL HISTORY  10/21/2022    Ankle fracture repair    OTHER SURGICAL HISTORY   10/21/2022    Lower leg fracture repair    OTHER SURGICAL HISTORY  10/21/2022    Colonoscopy    OTHER SURGICAL HISTORY  10/21/2022    Temporal artery biopsy   [3]   Social History  Tobacco Use    Smoking status: Former     Current packs/day: 0.00     Types: Cigarettes     Quit date: 1982     Years since quittin.3    Smokeless tobacco: Never   Vaping Use    Vaping status: Former   Substance Use Topics    Alcohol use: Yes     Alcohol/week: 3.0 standard drinks of alcohol     Types: 3 Shots of liquor per week    Drug use: Yes     Types: Marijuana   [4]   Family History  Problem Relation Name Age of Onset    Other (Arteriossclerrosis of nonautologous coronary artery bypass graft without angina pectoris) Sibling      Glaucoma Neg Hx      Macular degeneration Neg Hx

## 2025-04-22 NOTE — ASSESSMENT & PLAN NOTE
Statin intolerant.  On repatha through cardiology, Dr. Espitia.   Orders:    Lipid Panel; Future

## 2025-04-28 DIAGNOSIS — I10 HYPERTENSION, ESSENTIAL, BENIGN: ICD-10-CM

## 2025-04-28 RX ORDER — NIFEDIPINE 90 MG/1
90 TABLET, EXTENDED RELEASE ORAL 2 TIMES DAILY
Qty: 180 TABLET | Refills: 3 | Status: SHIPPED | OUTPATIENT
Start: 2025-04-28

## 2025-05-07 LAB
CHOLEST SERPL-MCNC: 287 MG/DL
CHOLEST/HDLC SERPL: 3.2 (CALC)
HDLC SERPL-MCNC: 91 MG/DL
LDLC SERPL CALC-MCNC: 151 MG/DL (CALC)
NONHDLC SERPL-MCNC: 196 MG/DL (CALC)
TRIGL SERPL-MCNC: 273 MG/DL

## 2025-05-22 ENCOUNTER — OFFICE VISIT (OUTPATIENT)
Dept: CARDIOLOGY | Facility: CLINIC | Age: 65
End: 2025-05-22
Payer: MEDICAID

## 2025-05-22 VITALS
HEIGHT: 62 IN | WEIGHT: 149.2 LBS | OXYGEN SATURATION: 95 % | BODY MASS INDEX: 27.46 KG/M2 | SYSTOLIC BLOOD PRESSURE: 119 MMHG | DIASTOLIC BLOOD PRESSURE: 85 MMHG | HEART RATE: 74 BPM

## 2025-05-22 DIAGNOSIS — E78.01 HETEROZYGOUS FAMILIAL HYPERCHOLESTEROLEMIA: Primary | ICD-10-CM

## 2025-05-22 DIAGNOSIS — N18.4 STAGE 4 CHRONIC KIDNEY DISEASE (MULTI): ICD-10-CM

## 2025-05-22 DIAGNOSIS — I10 HYPERTENSION, ESSENTIAL, BENIGN: ICD-10-CM

## 2025-05-22 DIAGNOSIS — I51.81 TAKOTSUBO CARDIOMYOPATHY: ICD-10-CM

## 2025-05-22 DIAGNOSIS — E78.5 HYPERLIPIDEMIA, UNSPECIFIED HYPERLIPIDEMIA TYPE: ICD-10-CM

## 2025-05-22 DIAGNOSIS — Z78.9 STATIN INTOLERANCE: ICD-10-CM

## 2025-05-22 PROCEDURE — 3008F BODY MASS INDEX DOCD: CPT | Performed by: INTERNAL MEDICINE

## 2025-05-22 PROCEDURE — 99214 OFFICE O/P EST MOD 30 MIN: CPT | Performed by: INTERNAL MEDICINE

## 2025-05-22 PROCEDURE — 3074F SYST BP LT 130 MM HG: CPT | Performed by: INTERNAL MEDICINE

## 2025-05-22 PROCEDURE — 1036F TOBACCO NON-USER: CPT | Performed by: INTERNAL MEDICINE

## 2025-05-22 PROCEDURE — 3079F DIAST BP 80-89 MM HG: CPT | Performed by: INTERNAL MEDICINE

## 2025-05-22 ASSESSMENT — ENCOUNTER SYMPTOMS
RESPIRATORY NEGATIVE: 1
NEUROLOGICAL NEGATIVE: 1
DEPRESSION: 0
EYES NEGATIVE: 1
CARDIOVASCULAR NEGATIVE: 1
HEMATOLOGIC/LYMPHATIC NEGATIVE: 1
ENDOCRINE NEGATIVE: 1
MUSCULOSKELETAL NEGATIVE: 1
PSYCHIATRIC NEGATIVE: 1
OCCASIONAL FEELINGS OF UNSTEADINESS: 0
ALLERGIC/IMMUNOLOGIC NEGATIVE: 1
CONSTITUTIONAL NEGATIVE: 1
LOSS OF SENSATION IN FEET: 1
GASTROINTESTINAL NEGATIVE: 1

## 2025-05-22 ASSESSMENT — PAIN SCALES - GENERAL: PAINLEVEL_OUTOF10: 0-NO PAIN

## 2025-05-22 NOTE — PROGRESS NOTES
Preventive Cardiology Clinic Note    Reason for Visit: Follow-up  Referring Clinician: Ethan    History of Present Illness: Estephania Hernandez is a 64 y.o. female with severe hypercholesterolemia due to heterozygous familial hypercholesterolemia with intolerance to multiple high intensity statins, complicated by prior Takotsubo cardiomyopathy with reverse remodeling/recovery, hypertension, polymyalgia rheumatica/temporal arteritis, and stage IV CKD without nephrotic syndrome who was referred by her primary care physician for management of severe hypercholesterolemia/heterozygous FH and is here for follow-up.  Since her last visit she has been doing well without any major health events or intercurrent hospitalizations.  She is adherent to the Repatha as prescribed and she has had an approximately 50% reduction in her LDL cholesterol since going back on the medication.  Her LDL remains elevated however to approximately 150 mg/dL and she is intolerant of any statin therapy and unable to take any statin at any dose.  She does not report any exertional chest pain, shortness of breath, palpitations, or syncope.    Past Medical History:  She has a past medical history of Chronic systolic (congestive) heart failure (2023) and Essential (primary) hypertension (12/30/2022).    Past Surgical History:  She has a past surgical history that includes Other surgical history (10/21/2022); Other surgical history (10/21/2022); Other surgical history (10/21/2022); Other surgical history (10/21/2022); Other surgical history (10/21/2022); Mastectomy (Bilateral, 2013); Appendectomy; and Cataract extraction (Bilateral).    Social History:  She reports that she quit smoking about 43 years ago. Her smoking use included cigarettes. She has never used smokeless tobacco. She reports current alcohol use of about 3.0 standard drinks of alcohol per week. She reports current drug use. Drug: Marijuana.    Family History:  Family  "History[1]    Allergies:  House dust mite, Mold, Cat dander, Dog dander, Sutures, Egg, Corn, and Doxycycline    Outpatient Medications:  Current Outpatient Medications   Medication Instructions    acetaminophen (Tylenol) 325 mg tablet 2 tablets, Every 4 hours PRN    amitriptyline (ELAVIL) 25 mg, oral, Nightly    apixaban (ELIQUIS) 2.5 mg, oral, 2 times daily    aspirin 81 mg EC tablet 1 tablet, Daily    fexofenadine (Allegra) 180 mg tablet 1 tablet, Daily    furosemide (LASIX) 40 mg, Daily    icosapent ethyL (VASCEPA) 2 g, oral, 2 times daily (morning and late afternoon)    metoprolol tartrate (Lopressor) 50 mg tablet 1 tablet, 2 times daily    NIFEdipine ER (ADALAT CC) 90 mg, oral, 2 times daily    omeprazole (PriLOSEC) 20 mg DR capsule 1 capsule, Daily    predniSONE (DELTASONE) 1 mg, Daily    predniSONE (DELTASONE) 5 mg, Daily    Repatha SureClick 140 mg, subcutaneous, Every 14 days    sodium zirconium cyclosilicate (LOKELMA) 5 g, Daily       Review of Systems:  Review of Systems   Constitutional: Negative.   HENT: Negative.     Eyes: Negative.    Cardiovascular: Negative.    Respiratory: Negative.     Endocrine: Negative.    Hematologic/Lymphatic: Negative.    Skin: Negative.    Musculoskeletal: Negative.    Gastrointestinal: Negative.    Genitourinary: Negative.    Neurological: Negative.    Psychiatric/Behavioral: Negative.     Allergic/Immunologic: Negative.        Last Recorded Vitals:  Vitals:    05/22/25 0954   BP: 119/85   BP Location: Right arm   Patient Position: Sitting   BP Cuff Size: Adult   Pulse: 74   SpO2: 95%   Weight: 67.7 kg (149 lb 3.2 oz)   Height: 1.575 m (5' 2\")       Physical Examination:  General: Well appearing, well-nourished, in no acute distress.  HEENT: Normocephalic atraumatic, pupils equal and reactive to light, extraocular muscles intact, no conjunctival injection, oropharynx clear without exudates.  Neck: Normal carotid arterial pulses, no arterial bruits, no " "thyromegaly.  Cardiac: Regular rhythm and normal heart rate.  S1, S2 present and normal.  No murmurs, rubs, or gallops.  PMI is nondisplaced. Jugular venous pulsations are normal.  Pulmonary: Normal breath sounds, no increased work of breathing, no wheezes or crackles.  GI: Normal bowel sounds, abdominal aorta not enlarged, no hepatosplenomegaly, no abdominal bruits.  Lower extremities: No cyanosis, clubbing, or edema.  No xanthelasma present. Normal distal pulses.  Skin: Skin intact. No significant rashes or lesions present.  Neuro: Alert and oriented x 3, normal attention and cognition, no focal motor or sensory neurologic deficits.  Psych: Normal affect and mood.  Musculoskeletal: Normal gait normal muscle tone.    Laboratory Studies:  Ref Range & Units 6 d ago   Albumin  3.9 - 4.9 g/dL 4.3   Calcium, Total  8.5 - 10.2 mg/dL 10.2   Phosphorus  2.7 - 4.8 mg/dL 3.5   Glucose  74 - 99 mg/dL 88   BUN  7 - 21 mg/dL 40 High    Creatinine  0.58 - 0.96 mg/dL 2.74 High    Sodium  136 - 144 mmol/L 138   Potassium  3.7 - 5.1 mmol/L 5.1   Chloride  98 - 107 mmol/L 100   CO2  22 - 30 mmol/L 20 Low    Anion Gap  8 - 15 mmol/L 18 High    Estimated Glomerular Filtration Rate  >=60 mL/min/1.73m² 19 Low      Lab Results   Component Value Date    CHOL 287 (H) 05/07/2025    CHOL 498 (H) 04/19/2024    CHOL 286 (H) 04/14/2023     Lab Results   Component Value Date    HDL 91 05/07/2025    HDL 75.4 04/19/2024    HDL 98.5 04/14/2023     Lab Results   Component Value Date    LDLCALC 151 (H) 05/07/2025    LDLCALC 364 (H) 04/19/2024     Lab Results   Component Value Date    TRIG 273 (H) 05/07/2025    TRIG 291 (H) 04/19/2024    TRIG 278 (H) 04/14/2023     No components found for: \"CHOLHDL\"  Lab Results   Component Value Date    HGBA1C 5.3 03/07/2025     Prot/Cr ratio 0.97 (elevated) - sees nephrology at     Cardiology Tests:  ECG:  ECG 12 lead 11/02/2023  Sinus tachycardia  Otherwise normal ECG  When compared with ECG of " 18-MAY-2023 12:15,  Premature atrial complexes are no longer Present  Confirmed by RAND Pennington ([id number]) on 11/2/2023 3:28:36 AM    Stress Test:  Nuclear Stress Test 07/17/2023  1. Normal stress myocardial perfusion imaging in response to  pharmacologic stress without evidence of ischemia or infarct.  2. Well-maintained left ventricular function with an ejection  fraction of greater than 65%.  3. Normal left ventricular size.    Assessment and Plan:  Problem List Items Addressed This Visit          Cardiac and Vasculature    Hypertension, essential, benign    Takotsubo cardiomyopathy    Heterozygous familial hypercholesterolemia - Primary       Genitourinary and Reproductive    Stage 4 chronic kidney disease (Multi)     Other Visit Diagnoses         Statin intolerance              Estephania Hernandez is a 64 y.o. female with severe hypercholesterolemia due to heterozygous familial hypercholesterolemia with intolerance to multiple high intensity statins, complicated by prior Takotsubo cardiomyopathy with reverse remodeling/recovery, hypertension, polymyalgia rheumatica/temporal arteritis, and stage IV CKD without nephrotic syndrome who was referred by her primary care physician for management of severe hypercholesterolemia/heterozygous FH and is here for follow-up.  She has had an as expected proximately 50% reduction in her LDL cholesterol with a PCSK9 inhibitor but her LDL cholesterol remains above goal less than 100 mg/dL.  Therefore I recommend the addition of combination bempedoic acid/ezetimibe for an expected additional lowering of 40%.  I did  her regarding the potential side effect of elevated uric acid/gout and I will check a uric acid level when we recheck her lipid panel in 3 months.  If she is unable to afford the combination therapy then I would recommend single drug therapy with ezetimibe in addition to her PCSK9 inhibitor instead.  With regard to her chronic kidney disease I will defer management to her  nephrologist at the [facility] but note that she is not on RAAS blockade or SGLT2 inhibitor.  I will see her again in 1 year here in the office for routine follow-up.  Please do not hesitate to call or contact me with any questions or concerns.    Palmer Espitia MD, LEIDY, [facility]  Director,  [facility]  Director,  [facility]  Associate Professor of Medicine  [employer]           [1]   Family History  Problem Relation Name Age of Onset    Other (Arteriossclerrosis of nonautologous coronary artery bypass graft without angina pectoris) Sibling      Glaucoma Neg Hx      Macular degeneration Neg Hx

## 2025-05-23 DIAGNOSIS — E78.01 HETEROZYGOUS FAMILIAL HYPERCHOLESTEROLEMIA: ICD-10-CM

## 2025-05-23 RX ORDER — EVOLOCUMAB 140 MG/ML
INJECTION, SOLUTION SUBCUTANEOUS
Qty: 6 ML | Refills: 0 | Status: SHIPPED | OUTPATIENT
Start: 2025-05-23

## 2025-05-29 ENCOUNTER — APPOINTMENT (OUTPATIENT)
Dept: CARDIOLOGY | Facility: CLINIC | Age: 65
End: 2025-05-29
Payer: MEDICAID

## 2025-06-27 ENCOUNTER — APPOINTMENT (OUTPATIENT)
Dept: OPHTHALMOLOGY | Facility: CLINIC | Age: 65
End: 2025-06-27
Payer: MEDICAID

## 2025-07-24 ENCOUNTER — TELEPHONE (OUTPATIENT)
Facility: CLINIC | Age: 65
End: 2025-07-24

## 2025-07-24 ENCOUNTER — OFFICE VISIT (OUTPATIENT)
Facility: CLINIC | Age: 65
End: 2025-07-24
Payer: MEDICAID

## 2025-07-24 VITALS
SYSTOLIC BLOOD PRESSURE: 118 MMHG | HEIGHT: 62 IN | TEMPERATURE: 98.1 F | HEART RATE: 76 BPM | RESPIRATION RATE: 20 BRPM | OXYGEN SATURATION: 95 % | BODY MASS INDEX: 27.05 KG/M2 | DIASTOLIC BLOOD PRESSURE: 81 MMHG | WEIGHT: 147 LBS

## 2025-07-24 DIAGNOSIS — Z79.52 ON PREDNISONE THERAPY: ICD-10-CM

## 2025-07-24 DIAGNOSIS — S81.802A WOUND OF LEFT LOWER EXTREMITY, INITIAL ENCOUNTER: Primary | ICD-10-CM

## 2025-07-24 DIAGNOSIS — Z86.19 HISTORY OF WOUND INFECTION: ICD-10-CM

## 2025-07-24 DIAGNOSIS — N18.4 STAGE 4 CHRONIC KIDNEY DISEASE (MULTI): ICD-10-CM

## 2025-07-24 DIAGNOSIS — M35.3 PMR (POLYMYALGIA RHEUMATICA) (MULTI): ICD-10-CM

## 2025-07-24 PROCEDURE — 99214 OFFICE O/P EST MOD 30 MIN: CPT | Performed by: FAMILY MEDICINE

## 2025-07-24 PROCEDURE — 3008F BODY MASS INDEX DOCD: CPT | Performed by: FAMILY MEDICINE

## 2025-07-24 PROCEDURE — 3074F SYST BP LT 130 MM HG: CPT | Performed by: FAMILY MEDICINE

## 2025-07-24 PROCEDURE — 3079F DIAST BP 80-89 MM HG: CPT | Performed by: FAMILY MEDICINE

## 2025-07-24 RX ORDER — AMOXICILLIN AND CLAVULANATE POTASSIUM 875; 125 MG/1; MG/1
875 TABLET, FILM COATED ORAL 2 TIMES DAILY
Qty: 14 TABLET | Refills: 0 | Status: SHIPPED | OUTPATIENT
Start: 2025-07-24 | End: 2025-07-31

## 2025-07-24 ASSESSMENT — ENCOUNTER SYMPTOMS
FEVER: 0
CHILLS: 0

## 2025-07-24 NOTE — PROGRESS NOTES
"Subjective     Estephania Hernandez is a 64 y.o. female who presents for Wound Check.    Wound Check    Patient sustained a left anterior leg wound/skin tear yesterday.  Her left leg struck the end of a wooden garbage can in a hotel room.  She had skin tearing, some fat tissue was exposed with bleeding.  She did not have bandages so she applied a sanitary pad to help stop the bleeding.  The bleeding eventually stopped.  She then went to her niece's house who is a physician and had gauze/bandage applied to area.  This morning she took the nonstick pad off her wound.  Mildly tender.      Patient has history of poor wound healing.  Hx of right ankle fracture surgery, several infections postoperatively in 2023.  Formerly getting hyperbaric treatment through wound care in [place] for the right ankle wound.  No history of diabetes.  She does take prednisone daily for her PMR.      Review of Systems   Constitutional:  Negative for chills and fever.       Objective     Vitals:    07/24/25 1255   BP: 118/81   BP Location: Right arm   Patient Position: Sitting   Pulse: 76   Resp: 20   Temp: 36.7 °C (98.1 °F)   TempSrc: Temporal   SpO2: 95%   Weight: 66.7 kg (147 lb)   Height: 1.575 m (5' 2\")        Current Outpatient Medications   Medication Instructions    acetaminophen (Tylenol) 325 mg tablet 2 tablets, Every 4 hours PRN    amitriptyline (ELAVIL) 25 mg, oral, Nightly    amoxicillin-clavulanate (Augmentin) 875-125 mg tablet 875 mg of amoxicillin, oral, 2 times daily    apixaban (ELIQUIS) 2.5 mg, oral, 2 times daily    aspirin 81 mg EC tablet 1 tablet, Daily    bempedoic acid-ezetimibe 180-10 mg tablet 1 tablet, oral, Daily    fexofenadine (Allegra) 180 mg tablet 1 tablet, Daily    furosemide (LASIX) 40 mg, Daily    icosapent ethyL (VASCEPA) 2 g, oral, 2 times daily (morning and late afternoon)    metoprolol tartrate (Lopressor) 50 mg tablet 1 tablet, 2 times daily    NIFEdipine ER (ADALAT CC) 90 mg, oral, 2 times daily    omeprazole " (PriLOSEC) 20 mg DR capsule 1 capsule, Daily    predniSONE (DELTASONE) 1 mg, Daily    predniSONE (DELTASONE) 5 mg, Daily    Repatha SureClick 140 mg/mL injection INJECT 1 ML UNDER THE SKIN EVERY 14 DAYS    sodium zirconium cyclosilicate (LOKELMA) 5 g, Daily        RX Allergies[1]     Surgical History[2]     Social History[3]     Family History[4]     Immunization History   Administered Date(s) Administered    COVID-19, mRNA, LNP-S, PF, 30 mcg/0.3 mL dose 04/05/2021, 04/26/2021    Pfizer COVID-19 vaccine, bivalent, age 12 years and older (30 mcg/0.3 mL) 10/28/2022    Pfizer Purple Cap SARS-CoV-2 12/28/2021, 04/04/2022    SARS-CoV-2, Unspecified 12/24/2021, 10/20/2022    Tdap vaccine, age 7 year and older (BOOSTRIX, ADACEL) 05/12/2015, 02/13/2022        Lab Results   Component Value Date    WBC 11.9 (H) 04/19/2024    RBC 4.58 04/19/2024    HGB 14.3 04/19/2024    HCT 43.9 04/19/2024    MCV 96 04/19/2024    MCH 31.2 04/19/2024    MCHC 32.6 04/19/2024     04/19/2024    MPV 10.1 10/30/2023     Lab Results   Component Value Date    GLUCOSE 90 04/19/2024     04/19/2024    K 4.3 04/19/2024    CL 97 (L) 04/19/2024    CO2 28 04/19/2024    ANIONGAP 18 04/19/2024    BUN 77 (H) 04/19/2024    CREATININE 2.91 (H) 04/19/2024    GFRF 19 (A) 09/26/2023    CALCIUM 10.3 04/19/2024    ALBUMIN 4.7 04/19/2024    ALKPHOS 70 04/19/2024    PROT 7.5 04/19/2024    AST 16 04/19/2024    BILITOT 0.3 04/19/2024    ALT 16 04/19/2024      Lab Results   Component Value Date    CHOL 287 (H) 05/07/2025    HDL 91 05/07/2025    CHHDL 3.2 05/07/2025    LDLCALC 151 (H) 05/07/2025    VLDL 58 (H) 04/19/2024    TRIG 273 (H) 05/07/2025      Lab Results   Component Value Date    TSH 2.75 04/19/2024      Lab Results   Component Value Date    VITD25 31 04/19/2024      Lab Results   Component Value Date    HGBA1C 5.3 03/07/2025    LECDEQFT0V 105 03/07/2025       Physical Exam  Vitals reviewed.   Constitutional:       General: She is not in acute  distress.     Appearance: Normal appearance. She is well-developed.   HENT:      Head: Normocephalic and atraumatic.     Eyes:      General: Lids are normal.      Conjunctiva/sclera:      Right eye: Right conjunctiva is not injected.      Left eye: Left conjunctiva is not injected.       Cardiovascular:      Rate and Rhythm: Normal rate and regular rhythm.   Pulmonary:      Effort: Pulmonary effort is normal. No respiratory distress.      Breath sounds: Normal breath sounds. No wheezing, rhonchi or rales.     Skin:     General: Skin is warm and dry.      Findings: No rash.      Comments: 4.5 x 3.5 cm skin tear of left anterior leg with some adipose tissue exposed.  Area is dry.  No active bleeding.  No purulent drainage       Neurological:      Mental Status: She is alert and oriented to person, place, and time. Mental status is at baseline.     Psychiatric:         Mood and Affect: Mood normal.         Behavior: Behavior normal.         Assessment & Plan  Wound of left lower extremity, initial encounter  Discussed basic wound care with patient today.  Due to her history of delayed wound healing secondary to her chronic prednisone use, I will prescribe augmentin and have her see wound care specialist.  Referral placed.  I discussed the signs and symptoms of skin/wound infection with the patient including fevers, chills, red streaking, worsening pain/redness/swelling/drainage over the affected area.  Patient should return to clinic immediately or go to the nearest Emergency Department if these symptoms occur.     Orders:    Referral to Wound Clinic; Future    amoxicillin-clavulanate (Augmentin) 875-125 mg tablet; Take 1 tablet (875 mg of amoxicillin) by mouth 2 times a day for 7 days.    History of wound infection    Orders:    Referral to Wound Clinic; Future    amoxicillin-clavulanate (Augmentin) 875-125 mg tablet; Take 1 tablet (875 mg of amoxicillin) by mouth 2 times a day for 7 days.    On prednisone  therapy    Orders:    Referral to Wound Clinic; Future    amoxicillin-clavulanate (Augmentin) 875-125 mg tablet; Take 1 tablet (875 mg of amoxicillin) by mouth 2 times a day for 7 days.    Stage 4 chronic kidney disease (Multi)         PMR (polymyalgia rheumatica) (Multi)                             [1]   Allergies  Allergen Reactions    House Dust Mite Other, Itching and Shortness of breath    Mold Cough, Itching and Shortness of breath     Itching, watery eyes    Cat Dander Other, Itching and Swelling    Dog Dander Other, Itching and Swelling    Sutures Itching and Swelling    Egg Other    Corn Other     headache    Doxycycline Rash   [2]   Past Surgical History:  Procedure Laterality Date    APPENDECTOMY      CATARACT EXTRACTION Bilateral     MASTECTOMY Bilateral     OTHER SURGICAL HISTORY  10/21/2022    Lower leg fracture repair    OTHER SURGICAL HISTORY  10/21/2022    Ankle fracture repair    OTHER SURGICAL HISTORY  10/21/2022    Lower leg fracture repair    OTHER SURGICAL HISTORY  10/21/2022    Colonoscopy    OTHER SURGICAL HISTORY  10/21/2022    Temporal artery biopsy   [3]   Social History  Tobacco Use    Smoking status: Former     Current packs/day: 0.00     Types: Cigarettes     Quit date: 1982     Years since quittin.5    Smokeless tobacco: Never   Vaping Use    Vaping status: Former   Substance Use Topics    Alcohol use: Yes     Alcohol/week: 3.0 standard drinks of alcohol     Types: 3 Shots of liquor per week     Comment: cocktail daily    Drug use: Yes     Types: Marijuana     Comment: marijuana daily   [4]   Family History  Problem Relation Name Age of Onset    Other (Arteriossclerrosis of nonautologous coronary artery bypass graft without angina pectoris) Sibling      Glaucoma Neg Hx      Macular degeneration Neg Hx        left breast lumpectomy left breast lumpectomy/done

## 2025-07-24 NOTE — TELEPHONE ENCOUNTER
PT called and said she injured her lower leg. A wound about the size of a half dollar.  She is concerned and wants to know if she can come in to see Dr. Long right away because she really does not want to go to the ED.  She has a history with wounds not healing Please advise

## 2025-07-25 ENCOUNTER — OFFICE VISIT (OUTPATIENT)
Dept: WOUND CARE | Facility: CLINIC | Age: 65
End: 2025-07-25
Payer: MEDICAID

## 2025-07-25 DIAGNOSIS — T14.8XXA OPEN WOUND: Primary | ICD-10-CM

## 2025-07-25 PROCEDURE — 11042 DBRDMT SUBQ TIS 1ST 20SQCM/<: CPT

## 2025-07-25 NOTE — PROGRESS NOTES
Subjective   Patient ID: Estephania Hernandez is a 64 y.o. female who presents for No chief complaint on file..  History of Present Illness  Estephania Hernandez is a 64 year old female with a history of breast cancer, PMR, GCA, and Takotsubo cardiomyopathy who presents with a leg wound and concerns about skin and bone fragility.    She has been on steroids for six years due to PMR and GCA, which she believes has contributed to her skin and bone fragility.    Three years ago, she sustained a leg fracture that resulted in a complicated surgical course with four infections. She underwent 100 sessions of hyperbaric oxygen therapy and used a wound vacuum, leading to eventual healing.    She is currently on Eliquis 2.5 mg twice daily due to blood clots that developed after prolonged immobilization from her leg fracture. She initially developed a pulmonary embolism, and after an attempt to discontinue Eliquis, she developed another clot in her leg. She has been on Eliquis for approximately two years.    Recently, she sustained a leg wound after hitting a garbage can. Initially, she used a nonadhesive pad, which caused pain upon removal, and then switched to a maxi pad, which she found effective. She is currently using a pad and Coban wrap for the dressing. She recalls that Dr. Alcocer started her on amoxicillin for the wound, as she has stage four kidney failure and issues with doxycycline.    She is relieved that her PMR and GCA have not flared up following this recent trauma, which she notes is unusual for her.    Physical Exam  Pleasant female.  Has moon face, alert oriented not pale no cyanosis  Has central obesity, has thin skin  No JVD no evidence of fluid overload  Has no focal signs  Has no leg edema's, peripheral pulses felt    Assessment & Plan  Left lower leg skin laceration with open wound  Sustained leg laceration with bleeding, managed with debridement and dressing. On Eliquis.  - Use Hydrofera Blue dressing.  - Apply  ABD pad and ACE wrap.  - Elevate leg as much as possible.    Polymyalgia Rheumatica (PMR) and Giant Cell Arteritis (GCA)  Managed with long-term steroids, causing weak skin and bones.    Chronic kidney disease stage 4  Stage 4 CKD influences medication selection. Amoxicillin chosen for renal safety.    Deep vein thrombosis and pulmonary embolism  History of DVT and PE post-immobility. On Eliquis for anticoagulation.      Deepak Marquez MD     This medical note was created with the assistance of artificial intelligence (AI) for documentation purposes. The content has been reviewed and confirmed by the healthcare provider for accuracy and completeness. Patient consented to the use of audio recording and use of AI during their visit.

## 2025-07-28 ENCOUNTER — APPOINTMENT (OUTPATIENT)
Dept: OTOLARYNGOLOGY | Facility: CLINIC | Age: 65
End: 2025-07-28
Payer: MEDICAID

## 2025-07-29 DIAGNOSIS — G47.00 INSOMNIA, UNSPECIFIED TYPE: ICD-10-CM

## 2025-07-29 RX ORDER — AMITRIPTYLINE HYDROCHLORIDE 25 MG/1
25 TABLET, FILM COATED ORAL NIGHTLY
Qty: 90 TABLET | Refills: 3 | Status: SHIPPED | OUTPATIENT
Start: 2025-07-29

## 2025-08-01 ENCOUNTER — OFFICE VISIT (OUTPATIENT)
Dept: WOUND CARE | Facility: CLINIC | Age: 65
End: 2025-08-01
Payer: MEDICAID

## 2025-08-01 DIAGNOSIS — L97.909 VENOUS ULCER (MULTI): Primary | ICD-10-CM

## 2025-08-01 DIAGNOSIS — I83.009 VENOUS ULCER (MULTI): Primary | ICD-10-CM

## 2025-08-01 PROCEDURE — 11042 DBRDMT SUBQ TIS 1ST 20SQCM/<: CPT

## 2025-08-03 LAB
BACTERIA SPEC AEROBE CULT: NORMAL
BACTERIA SPEC ANAEROBE CULT: NORMAL

## 2025-08-04 ENCOUNTER — PREP FOR PROCEDURE (OUTPATIENT)
Dept: SURGERY | Facility: HOSPITAL | Age: 65
End: 2025-08-04
Payer: MEDICAID

## 2025-08-07 ENCOUNTER — OFFICE VISIT (OUTPATIENT)
Dept: WOUND CARE | Facility: CLINIC | Age: 65
End: 2025-08-07
Payer: MEDICAID

## 2025-08-07 DIAGNOSIS — L03.90 WOUND CELLULITIS: Primary | ICD-10-CM

## 2025-08-07 LAB
BACTERIA SPEC AEROBE CULT: NORMAL
BACTERIA SPEC ANAEROBE CULT: NORMAL

## 2025-08-07 PROCEDURE — 11042 DBRDMT SUBQ TIS 1ST 20SQCM/<: CPT

## 2025-08-07 PROCEDURE — 99281 EMR DPT VST MAYX REQ PHY/QHP: CPT | Performed by: STUDENT IN AN ORGANIZED HEALTH CARE EDUCATION/TRAINING PROGRAM

## 2025-08-07 PROCEDURE — 11042 DBRDMT SUBQ TIS 1ST 20SQCM/<: CPT | Performed by: PLASTIC SURGERY

## 2025-08-07 PROCEDURE — 99283 EMERGENCY DEPT VISIT LOW MDM: CPT | Performed by: STUDENT IN AN ORGANIZED HEALTH CARE EDUCATION/TRAINING PROGRAM

## 2025-08-08 ENCOUNTER — HOSPITAL ENCOUNTER (EMERGENCY)
Facility: HOSPITAL | Age: 65
Discharge: HOME | End: 2025-08-08
Attending: STUDENT IN AN ORGANIZED HEALTH CARE EDUCATION/TRAINING PROGRAM
Payer: MEDICAID

## 2025-08-08 VITALS
DIASTOLIC BLOOD PRESSURE: 89 MMHG | BODY MASS INDEX: 27.05 KG/M2 | HEART RATE: 76 BPM | WEIGHT: 147 LBS | HEIGHT: 62 IN | RESPIRATION RATE: 16 BRPM | TEMPERATURE: 96.8 F | SYSTOLIC BLOOD PRESSURE: 167 MMHG | OXYGEN SATURATION: 100 %

## 2025-08-08 DIAGNOSIS — Z51.89 ENCOUNTER FOR WOUND CARE: Primary | ICD-10-CM

## 2025-08-08 NOTE — ED PROVIDER NOTES
Emergency Department Provider Note        History of Present Illness     History provided by: Patient  Limitations to History: None  External Records Reviewed with Brief Summary: Progress note from internal medicine from 7/25/2025 daily history    HPI:  Estephania Hernandez is a 64 y.o. female past medical history significant for breast cancer, polymyalgia rheumatica, GCA on long-term steroids, CKD, Takotsubo cardiomyopathy, prior clots on Eliquis presenting with a leg wound and is having difficulty healing.  Patient got leg wound debrided today at wound clinic and subsequently had significant amount of bleeding throughout the bandage therefore presented to the ED due to difficulty with hemostasis.  She denies any significant pain.  Did states she was standing more frequently today as [place].    Physical Exam   Triage vitals:  T 36 °C (96.8 °F)  HR 96  BP (!) 173/95  RR 16  O2 97 % None (Room air)    Physical Exam  Vitals and nursing note reviewed.   Constitutional:       Appearance: Normal appearance.   HENT:      Head: Normocephalic and atraumatic.      Nose: Nose normal.      Mouth/Throat:      Mouth: Mucous membranes are moist.     Cardiovascular:      Rate and Rhythm: Normal rate and regular rhythm.      Pulses: Normal pulses.      Heart sounds: Normal heart sounds.   Pulmonary:      Effort: Pulmonary effort is normal.      Breath sounds: Normal breath sounds.   Abdominal:      General: Abdomen is flat.      Palpations: Abdomen is soft.     Musculoskeletal:         General: Normal range of motion.      Cervical back: Normal range of motion.     Skin:     General: Skin is warm.      Capillary Refill: Capillary refill takes less than 2 seconds.      Comments: Bleeding left leg as described in media.  Multiple centimeter open wound with pulsatile bleeding noted.     Neurological:      General: No focal deficit present.      Mental Status: She is alert.                Medical Decision Making & ED Course    Medical Decision Makin y.o. female with HPI and past medical history as described above.  Dressings were taken down, direct pressure was held on wound for roughly 15 minutes with adequate hemostasis achieved.  Dressing applied with Surgicel, Xeroform, ABD pad, Kerlix followed by Coban.  Patient was observed for roughly 1 hour with no significant rebleeding.  Patient discharged with continued wound care  ----    Differential diagnoses considered include but are not limited to: Bleeding wound following debridement, infected wound, arterial bleed, venous bleed     Social Determinants of Health which Significantly Impact Care: None identified     EKG Independent Interpretation: EKG not obtained    Independent Result Review and Interpretation: Relevant laboratory and radiographic results were reviewed and independently interpreted by myself.  As necessary, they are commented on in the ED Course.    Chronic conditions affecting the patient's care: As documented above in MDM    The patient was discussed with the following consultants/services: None    Care Considerations: As documented above in [facility]    ED Course:  Diagnoses as of 25 [id number]   Encounter for wound care     Disposition   Discharge    Procedures   Procedures    Patient seen and discussed with ED attending physician.    Erasmo Carroll MD  Emergency Medicine     Erasmo Carroll MD  Resident  [phone]

## 2025-08-08 NOTE — DISCHARGE INSTRUCTIONS
Continue to follow-up with wound care.    Please return to the ER or seek immediate medical attention if you experience Increasing redness, increasing warmth to touch, milky foul smelling drainage from your wound, fever, or worsening symptoms    You are welcome back any time. Thank you for entrusting your care to us, I hope we made your visit as pleasant as possible. Wishing you well!    Dr. Carroll

## 2025-08-14 ENCOUNTER — OFFICE VISIT (OUTPATIENT)
Dept: WOUND CARE | Facility: CLINIC | Age: 65
End: 2025-08-14
Payer: MEDICAID

## 2025-08-14 PROCEDURE — 11042 DBRDMT SUBQ TIS 1ST 20SQCM/<: CPT | Performed by: PLASTIC SURGERY

## 2025-08-14 PROCEDURE — 11042 DBRDMT SUBQ TIS 1ST 20SQCM/<: CPT

## 2025-08-15 ENCOUNTER — PREP FOR PROCEDURE (OUTPATIENT)
Dept: SURGERY | Facility: HOSPITAL | Age: 65
End: 2025-08-15
Payer: MEDICAID

## 2025-08-21 ENCOUNTER — OFFICE VISIT (OUTPATIENT)
Dept: WOUND CARE | Facility: CLINIC | Age: 65
End: 2025-08-21
Payer: MEDICAID

## 2025-08-21 PROCEDURE — 11042 DBRDMT SUBQ TIS 1ST 20SQCM/<: CPT

## 2025-08-21 PROCEDURE — 11042 DBRDMT SUBQ TIS 1ST 20SQCM/<: CPT | Performed by: PLASTIC SURGERY

## 2025-08-22 DIAGNOSIS — E78.5 HYPERLIPIDEMIA, UNSPECIFIED HYPERLIPIDEMIA TYPE: ICD-10-CM

## 2025-08-22 DIAGNOSIS — E78.01 HETEROZYGOUS FAMILIAL HYPERCHOLESTEROLEMIA: ICD-10-CM

## 2025-08-28 ENCOUNTER — OFFICE VISIT (OUTPATIENT)
Dept: WOUND CARE | Facility: CLINIC | Age: 65
End: 2025-08-28
Payer: MEDICAID

## 2025-08-29 ENCOUNTER — PREP FOR PROCEDURE (OUTPATIENT)
Dept: SURGERY | Facility: HOSPITAL | Age: 65
End: 2025-08-29
Payer: MEDICAID

## 2025-09-04 ENCOUNTER — OFFICE VISIT (OUTPATIENT)
Dept: WOUND CARE | Facility: CLINIC | Age: 65
End: 2025-09-04
Payer: MEDICAID

## 2025-09-04 DIAGNOSIS — L03.90 WOUND CELLULITIS: Primary | ICD-10-CM

## 2025-09-04 PROCEDURE — 11042 DBRDMT SUBQ TIS 1ST 20SQCM/<: CPT | Performed by: PLASTIC SURGERY

## 2025-09-04 PROCEDURE — 11042 DBRDMT SUBQ TIS 1ST 20SQCM/<: CPT

## 2025-09-07 LAB
BACTERIA SPEC AEROBE CULT: NORMAL
BACTERIA SPEC ANAEROBE CULT: NORMAL